# Patient Record
Sex: MALE | Race: WHITE | NOT HISPANIC OR LATINO | Employment: OTHER | URBAN - METROPOLITAN AREA
[De-identification: names, ages, dates, MRNs, and addresses within clinical notes are randomized per-mention and may not be internally consistent; named-entity substitution may affect disease eponyms.]

---

## 2023-09-27 ENCOUNTER — HOSPITAL ENCOUNTER (EMERGENCY)
Facility: HOSPITAL | Age: 83
Discharge: ANOTHER HOSPITAL | End: 2023-09-27
Attending: EMERGENCY MEDICINE
Payer: MEDICARE

## 2023-09-27 VITALS
TEMPERATURE: 98 F | WEIGHT: 160 LBS | BODY MASS INDEX: 22.4 KG/M2 | OXYGEN SATURATION: 98 % | DIASTOLIC BLOOD PRESSURE: 59 MMHG | HEART RATE: 103 BPM | SYSTOLIC BLOOD PRESSURE: 104 MMHG | RESPIRATION RATE: 18 BRPM | HEIGHT: 71 IN

## 2023-09-27 DIAGNOSIS — I21.4 NSTEMI (NON-ST ELEVATED MYOCARDIAL INFARCTION): Primary | ICD-10-CM

## 2023-09-27 DIAGNOSIS — J44.9 CHRONIC OBSTRUCTIVE PULMONARY DISEASE, UNSPECIFIED COPD TYPE: ICD-10-CM

## 2023-09-27 DIAGNOSIS — R06.00 DYSPNEA: ICD-10-CM

## 2023-09-27 DIAGNOSIS — E87.20 LACTIC ACIDOSIS: ICD-10-CM

## 2023-09-27 LAB
ALBUMIN SERPL BCP-MCNC: 4.3 G/DL (ref 3.5–5.2)
ALLENS TEST: ABNORMAL
ALP SERPL-CCNC: 84 U/L (ref 55–135)
ALT SERPL W/O P-5'-P-CCNC: 34 U/L (ref 10–44)
ANION GAP SERPL CALC-SCNC: 14 MMOL/L (ref 8–16)
APTT PPP: 26.9 SEC (ref 21–32)
AST SERPL-CCNC: 47 U/L (ref 10–40)
BASOPHILS # BLD AUTO: 0.09 K/UL (ref 0–0.2)
BASOPHILS NFR BLD: 0.8 % (ref 0–1.9)
BILIRUB SERPL-MCNC: 0.9 MG/DL (ref 0.1–1)
BILIRUB UR QL STRIP: NEGATIVE
BNP SERPL-MCNC: 1132 PG/ML (ref 0–99)
BUN SERPL-MCNC: 13 MG/DL (ref 8–23)
CALCIUM SERPL-MCNC: 9.2 MG/DL (ref 8.7–10.5)
CHLORIDE SERPL-SCNC: 100 MMOL/L (ref 95–110)
CK SERPL-CCNC: 129 U/L (ref 20–200)
CLARITY UR REFRACT.AUTO: CLEAR
CO2 SERPL-SCNC: 24 MMOL/L (ref 23–29)
COLOR UR AUTO: YELLOW
CREAT SERPL-MCNC: 0.8 MG/DL (ref 0.5–1.4)
CTP QC/QA: YES
CTP QC/QA: YES
D DIMER PPP IA.FEU-MCNC: 1.67 MG/L FEU
DIFFERENTIAL METHOD: ABNORMAL
EOSINOPHIL # BLD AUTO: 0.1 K/UL (ref 0–0.5)
EOSINOPHIL NFR BLD: 1.1 % (ref 0–8)
ERYTHROCYTE [DISTWIDTH] IN BLOOD BY AUTOMATED COUNT: 13.2 % (ref 11.5–14.5)
EST. GFR  (NO RACE VARIABLE): >60 ML/MIN/1.73 M^2
GLUCOSE SERPL-MCNC: 212 MG/DL (ref 70–110)
GLUCOSE UR QL STRIP: NEGATIVE
HCO3 UR-SCNC: 24.9 MMOL/L
HCT VFR BLD AUTO: 49.9 % (ref 40–54)
HGB BLD-MCNC: 16.5 G/DL (ref 14–18)
HGB UR QL STRIP: NEGATIVE
IMM GRANULOCYTES # BLD AUTO: 0.08 K/UL (ref 0–0.04)
IMM GRANULOCYTES NFR BLD AUTO: 0.7 % (ref 0–0.5)
INR PPP: 1 (ref 0.8–1.2)
KETONES UR QL STRIP: ABNORMAL
LACTATE SERPL-SCNC: 3.6 MMOL/L (ref 0.5–2.2)
LEUKOCYTE ESTERASE UR QL STRIP: NEGATIVE
LYMPHOCYTES # BLD AUTO: 3.4 K/UL (ref 1–4.8)
LYMPHOCYTES NFR BLD: 30 % (ref 18–48)
MAGNESIUM SERPL-MCNC: 2.1 MG/DL (ref 1.6–2.6)
MCH RBC QN AUTO: 32.1 PG (ref 27–31)
MCHC RBC AUTO-ENTMCNC: 33.1 G/DL (ref 32–36)
MCV RBC AUTO: 97 FL (ref 82–98)
MONOCYTES # BLD AUTO: 1.2 K/UL (ref 0.3–1)
MONOCYTES NFR BLD: 10.7 % (ref 4–15)
NEUTROPHILS # BLD AUTO: 6.5 K/UL (ref 1.8–7.7)
NEUTROPHILS NFR BLD: 56.7 % (ref 38–73)
NITRITE UR QL STRIP: NEGATIVE
NRBC BLD-RTO: 0 /100 WBC
PCO2 BLDA: 59.5 MMHG (ref 35–45)
PH SMN: 7.23 [PH] (ref 7.35–7.45)
PH UR STRIP: 7 [PH] (ref 5–8)
PLATELET # BLD AUTO: 209 K/UL (ref 150–450)
PMV BLD AUTO: 10.5 FL (ref 9.2–12.9)
PO2 BLDA: 80 MMHG (ref 80–100)
POC BE: -3 MMOL/L
POC MOLECULAR INFLUENZA A AGN: NEGATIVE
POC MOLECULAR INFLUENZA B AGN: NEGATIVE
POC SATURATED O2: 93 % (ref 95–100)
POTASSIUM SERPL-SCNC: 3.8 MMOL/L (ref 3.5–5.1)
PROCALCITONIN SERPL IA-MCNC: <0.02 NG/ML
PROT SERPL-MCNC: 7.1 G/DL (ref 6–8.4)
PROT UR QL STRIP: ABNORMAL
PROTHROMBIN TIME: 10.8 SEC (ref 9–12.5)
RBC # BLD AUTO: 5.14 M/UL (ref 4.6–6.2)
SAMPLE: ABNORMAL
SARS-COV-2 RDRP RESP QL NAA+PROBE: NEGATIVE
SITE: ABNORMAL
SODIUM SERPL-SCNC: 138 MMOL/L (ref 136–145)
SP GR UR STRIP: 1.01 (ref 1–1.03)
TROPONIN I SERPL DL<=0.01 NG/ML-MCNC: 0.71 NG/ML (ref 0–0.03)
URN SPEC COLLECT METH UR: ABNORMAL
UROBILINOGEN UR STRIP-ACNC: NEGATIVE EU/DL
WBC # BLD AUTO: 11.43 K/UL (ref 3.9–12.7)

## 2023-09-27 PROCEDURE — 82550 ASSAY OF CK (CPK): CPT | Mod: ER | Performed by: EMERGENCY MEDICINE

## 2023-09-27 PROCEDURE — 84484 ASSAY OF TROPONIN QUANT: CPT | Mod: ER | Performed by: EMERGENCY MEDICINE

## 2023-09-27 PROCEDURE — 25500020 PHARM REV CODE 255: Mod: ER | Performed by: EMERGENCY MEDICINE

## 2023-09-27 PROCEDURE — 94640 AIRWAY INHALATION TREATMENT: CPT | Mod: ER

## 2023-09-27 PROCEDURE — 93005 ELECTROCARDIOGRAM TRACING: CPT | Mod: ER

## 2023-09-27 PROCEDURE — 87635 SARS-COV-2 COVID-19 AMP PRB: CPT | Mod: ER | Performed by: EMERGENCY MEDICINE

## 2023-09-27 PROCEDURE — 85730 THROMBOPLASTIN TIME PARTIAL: CPT | Mod: ER | Performed by: EMERGENCY MEDICINE

## 2023-09-27 PROCEDURE — 83605 ASSAY OF LACTIC ACID: CPT | Mod: ER | Performed by: EMERGENCY MEDICINE

## 2023-09-27 PROCEDURE — 27100171 HC OXYGEN HIGH FLOW UP TO 24 HOURS: Mod: ER

## 2023-09-27 PROCEDURE — 83880 ASSAY OF NATRIURETIC PEPTIDE: CPT | Mod: ER | Performed by: EMERGENCY MEDICINE

## 2023-09-27 PROCEDURE — 94660 CPAP INITIATION&MGMT: CPT | Mod: ER

## 2023-09-27 PROCEDURE — 99291 CRITICAL CARE FIRST HOUR: CPT | Mod: 25,ER

## 2023-09-27 PROCEDURE — 87502 INFLUENZA DNA AMP PROBE: CPT | Mod: ER

## 2023-09-27 PROCEDURE — 81003 URINALYSIS AUTO W/O SCOPE: CPT | Mod: ER | Performed by: EMERGENCY MEDICINE

## 2023-09-27 PROCEDURE — 93010 EKG 12-LEAD: ICD-10-PCS | Mod: ,,, | Performed by: INTERNAL MEDICINE

## 2023-09-27 PROCEDURE — 80053 COMPREHEN METABOLIC PANEL: CPT | Mod: ER | Performed by: EMERGENCY MEDICINE

## 2023-09-27 PROCEDURE — 63600175 PHARM REV CODE 636 W HCPCS: Mod: ER | Performed by: EMERGENCY MEDICINE

## 2023-09-27 PROCEDURE — 94761 N-INVAS EAR/PLS OXIMETRY MLT: CPT | Mod: ER

## 2023-09-27 PROCEDURE — 93010 ELECTROCARDIOGRAM REPORT: CPT | Mod: ,,, | Performed by: INTERNAL MEDICINE

## 2023-09-27 PROCEDURE — 96375 TX/PRO/DX INJ NEW DRUG ADDON: CPT | Mod: ER

## 2023-09-27 PROCEDURE — 96366 THER/PROPH/DIAG IV INF ADDON: CPT | Mod: ER

## 2023-09-27 PROCEDURE — 96374 THER/PROPH/DIAG INJ IV PUSH: CPT | Mod: 59,ER

## 2023-09-27 PROCEDURE — 25000003 PHARM REV CODE 250: Mod: ER | Performed by: EMERGENCY MEDICINE

## 2023-09-27 PROCEDURE — 96365 THER/PROPH/DIAG IV INF INIT: CPT | Mod: ER

## 2023-09-27 PROCEDURE — 87040 BLOOD CULTURE FOR BACTERIA: CPT | Mod: 59 | Performed by: EMERGENCY MEDICINE

## 2023-09-27 PROCEDURE — 96367 TX/PROPH/DG ADDL SEQ IV INF: CPT | Mod: ER

## 2023-09-27 PROCEDURE — 99900035 HC TECH TIME PER 15 MIN (STAT): Mod: ER

## 2023-09-27 PROCEDURE — 85379 FIBRIN DEGRADATION QUANT: CPT | Mod: ER | Performed by: EMERGENCY MEDICINE

## 2023-09-27 PROCEDURE — 85025 COMPLETE CBC W/AUTO DIFF WBC: CPT | Mod: ER | Performed by: EMERGENCY MEDICINE

## 2023-09-27 PROCEDURE — 82800 BLOOD PH: CPT | Mod: ER

## 2023-09-27 PROCEDURE — 82803 BLOOD GASES ANY COMBINATION: CPT | Mod: ER

## 2023-09-27 PROCEDURE — 36600 WITHDRAWAL OF ARTERIAL BLOOD: CPT | Mod: ER,59

## 2023-09-27 PROCEDURE — 83735 ASSAY OF MAGNESIUM: CPT | Mod: ER | Performed by: EMERGENCY MEDICINE

## 2023-09-27 PROCEDURE — 25000242 PHARM REV CODE 250 ALT 637 W/ HCPCS: Mod: ER | Performed by: EMERGENCY MEDICINE

## 2023-09-27 PROCEDURE — 84145 PROCALCITONIN (PCT): CPT | Mod: ER | Performed by: EMERGENCY MEDICINE

## 2023-09-27 PROCEDURE — 85610 PROTHROMBIN TIME: CPT | Mod: ER | Performed by: EMERGENCY MEDICINE

## 2023-09-27 PROCEDURE — 27000190 HC CPAP FULL FACE MASK W/VALVE: Mod: ER

## 2023-09-27 RX ORDER — ASPIRIN 325 MG
325 TABLET ORAL
Status: COMPLETED | OUTPATIENT
Start: 2023-09-27 | End: 2023-09-27

## 2023-09-27 RX ORDER — IPRATROPIUM BROMIDE AND ALBUTEROL SULFATE 2.5; .5 MG/3ML; MG/3ML
3 SOLUTION RESPIRATORY (INHALATION)
Status: COMPLETED | OUTPATIENT
Start: 2023-09-27 | End: 2023-09-27

## 2023-09-27 RX ORDER — FUROSEMIDE 10 MG/ML
40 INJECTION INTRAMUSCULAR; INTRAVENOUS
Status: DISCONTINUED | OUTPATIENT
Start: 2023-09-27 | End: 2023-09-27

## 2023-09-27 RX ORDER — FUROSEMIDE 10 MG/ML
40 INJECTION INTRAMUSCULAR; INTRAVENOUS
Status: COMPLETED | OUTPATIENT
Start: 2023-09-27 | End: 2023-09-27

## 2023-09-27 RX ORDER — LORAZEPAM 2 MG/ML
0.5 INJECTION INTRAMUSCULAR
Status: DISCONTINUED | OUTPATIENT
Start: 2023-09-27 | End: 2023-09-27

## 2023-09-27 RX ORDER — CLONIDINE HYDROCHLORIDE 0.1 MG/1
0.1 TABLET ORAL
Status: COMPLETED | OUTPATIENT
Start: 2023-09-27 | End: 2023-09-27

## 2023-09-27 RX ORDER — MAGNESIUM SULFATE HEPTAHYDRATE 40 MG/ML
2 INJECTION, SOLUTION INTRAVENOUS
Status: COMPLETED | OUTPATIENT
Start: 2023-09-27 | End: 2023-09-27

## 2023-09-27 RX ORDER — HEPARIN SODIUM,PORCINE/D5W 25000/250
0-40 INTRAVENOUS SOLUTION INTRAVENOUS CONTINUOUS
Status: DISCONTINUED | OUTPATIENT
Start: 2023-09-27 | End: 2023-09-27 | Stop reason: HOSPADM

## 2023-09-27 RX ADMIN — CEFTRIAXONE 1 G: 1 INJECTION, POWDER, FOR SOLUTION INTRAMUSCULAR; INTRAVENOUS at 01:09

## 2023-09-27 RX ADMIN — IPRATROPIUM BROMIDE AND ALBUTEROL SULFATE 3 ML: .5; 3 SOLUTION RESPIRATORY (INHALATION) at 01:09

## 2023-09-27 RX ADMIN — MAGNESIUM SULFATE 2 G: 2 INJECTION INTRAVENOUS at 01:09

## 2023-09-27 RX ADMIN — HEPARIN SODIUM 12 UNITS/KG/HR: 10000 INJECTION, SOLUTION INTRAVENOUS at 02:09

## 2023-09-27 RX ADMIN — CLONIDINE HYDROCHLORIDE 0.1 MG: 0.1 TABLET ORAL at 01:09

## 2023-09-27 RX ADMIN — FUROSEMIDE 40 MG: 10 INJECTION, SOLUTION INTRAMUSCULAR; INTRAVENOUS at 02:09

## 2023-09-27 RX ADMIN — IOHEXOL 100 ML: 350 INJECTION, SOLUTION INTRAVENOUS at 02:09

## 2023-09-27 RX ADMIN — ASPIRIN 325 MG: 325 TABLET ORAL at 02:09

## 2023-09-27 NOTE — ED PROVIDER NOTES
Encounter Date: 9/27/2023       History     Chief Complaint   Patient presents with    Shortness of Breath     Hx of COPD. Wife had covid x1 week ago     The history is provided by the patient.   Shortness of Breath  This is a recurrent problem. The problem occurs continuously.The current episode started 3 to 5 hours ago. The problem has not changed since onset.Associated symptoms include cough and wheezing. Pertinent negatives include no fever, no sore throat, no sputum production, no hemoptysis, no chest pain, no abdominal pain, no rash, no leg pain and no leg swelling.   Pt s/o reportedly had Covid last week. Pt c Hx COPD. Pt tx c Solumedrol and Nebs by EMS prior to arrival.    Review of patient's allergies indicates:   Allergen Reactions    Sulfamethoxazole-trimethoprim Itching     No past medical history on file.  No past surgical history on file.  No family history on file.     Review of Systems   Constitutional:  Negative for fever.   HENT:  Negative for congestion and sore throat.    Eyes:  Negative for photophobia and visual disturbance.   Respiratory:  Positive for cough, shortness of breath and wheezing. Negative for hemoptysis and sputum production.    Cardiovascular:  Negative for chest pain and leg swelling.   Gastrointestinal:  Negative for abdominal pain and nausea.   Genitourinary:  Negative for dysuria.   Musculoskeletal:  Negative for back pain.   Skin:  Negative for rash.   Neurological:  Negative for weakness.   Hematological:  Does not bruise/bleed easily.       Physical Exam     Initial Vitals   BP Pulse Resp Temp SpO2   09/27/23 0117 09/27/23 0115 09/27/23 0115 -- 09/27/23 0115   (!) 160/118 (!) 126 (!) 32  100 %      MAP       --                Physical Exam    Nursing note and vitals reviewed.  Constitutional: He appears well-developed and well-nourished. He appears distressed.   Thin malnourished appearing   HENT:   Head: Normocephalic and atraumatic.   Mouth/Throat: Oropharynx is clear  and moist.   Eyes: Conjunctivae and EOM are normal. Pupils are equal, round, and reactive to light.   Neck: Neck supple.   Normal range of motion.  Cardiovascular:  Regular rhythm and normal heart sounds.   Tachycardia present.         Pulmonary/Chest: Accessory muscle usage present. Tachypnea noted. He is in respiratory distress. He has wheezes.   Abdominal: Abdomen is soft. Bowel sounds are normal.   Musculoskeletal:         General: Normal range of motion.      Cervical back: Normal range of motion and neck supple.     Neurological: He is alert and oriented to person, place, and time. He has normal strength.   Skin: Skin is warm and dry.   Psychiatric: He has a normal mood and affect. Thought content normal.         ED Course   Critical Care    Date/Time: 9/27/2023 2:29 AM    Performed by: Benja Aceves MD  Authorized by: Bneja Aceves MD  Direct patient critical care time: 17 minutes  Additional history critical care time: 9 minutes  Ordering / reviewing critical care time: 8 minutes  Documentation critical care time: 10 minutes  Consult with family critical care time: 6 minutes  Total critical care time (exclusive of procedural time) : 50 minutes  Critical care time was exclusive of separately billable procedures and treating other patients.  Critical care was necessary to treat or prevent imminent or life-threatening deterioration of the following conditions: cardiac failure and respiratory failure.  Critical care was time spent personally by me on the following activities: blood draw for specimens, development of treatment plan with patient or surrogate, evaluation of patient's response to treatment, examination of patient, obtaining history from patient or surrogate, ordering and review of laboratory studies, ordering and performing treatments and interventions, ordering and review of radiographic studies, pulse oximetry and re-evaluation of patient's condition.        Labs Reviewed    LACTIC ACID, PLASMA - Abnormal; Notable for the following components:       Result Value    Lactate (Lactic Acid) 3.6 (*)     All other components within normal limits    Narrative:      Lactic acid  critical result(s) called and verbal readback obtained   from Sofía Monae  by CG4 09/27/2023 01:53   B-TYPE NATRIURETIC PEPTIDE - Abnormal; Notable for the following components:    BNP 1,132 (*)     All other components within normal limits   CBC W/ AUTO DIFFERENTIAL - Abnormal; Notable for the following components:    MCH 32.1 (*)     Immature Granulocytes 0.7 (*)     Immature Grans (Abs) 0.08 (*)     Mono # 1.2 (*)     All other components within normal limits   COMPREHENSIVE METABOLIC PANEL - Abnormal; Notable for the following components:    Glucose 212 (*)     AST 47 (*)     All other components within normal limits   TROPONIN I - Abnormal; Notable for the following components:    Troponin I 0.712 (*)     All other components within normal limits   D DIMER, QUANTITATIVE - Abnormal; Notable for the following components:    D-Dimer 1.67 (*)     All other components within normal limits   ISTAT PROCEDURE - Abnormal; Notable for the following components:    POC PH 7.229 (*)     POC PCO2 59.5 (*)     POC SATURATED O2 93 (*)     All other components within normal limits   CULTURE, BLOOD   CULTURE, BLOOD   CK   MAGNESIUM   PROCALCITONIN   URINALYSIS, REFLEX TO URINE CULTURE   APTT   APTT   PROTIME-INR   CBC W/ AUTO DIFFERENTIAL   SARS-COV-2 RDRP GENE   POCT INFLUENZA A/B MOLECULAR     Results for orders placed or performed during the hospital encounter of 09/27/23   Lactic Acid, Plasma   Result Value Ref Range    Lactate (Lactic Acid) 3.6 (HH) 0.5 - 2.2 mmol/L   BNP   Result Value Ref Range    BNP 1,132 (H) 0 - 99 pg/mL   CK   Result Value Ref Range     20 - 200 U/L   CBC Auto Differential   Result Value Ref Range    WBC 11.43 3.90 - 12.70 K/uL    RBC 5.14 4.60 - 6.20 M/uL    Hemoglobin 16.5 14.0 - 18.0 g/dL     Hematocrit 49.9 40.0 - 54.0 %    MCV 97 82 - 98 fL    MCH 32.1 (H) 27.0 - 31.0 pg    MCHC 33.1 32.0 - 36.0 g/dL    RDW 13.2 11.5 - 14.5 %    Platelets 209 150 - 450 K/uL    MPV 10.5 9.2 - 12.9 fL    Immature Granulocytes 0.7 (H) 0.0 - 0.5 %    Gran # (ANC) 6.5 1.8 - 7.7 K/uL    Immature Grans (Abs) 0.08 (H) 0.00 - 0.04 K/uL    Lymph # 3.4 1.0 - 4.8 K/uL    Mono # 1.2 (H) 0.3 - 1.0 K/uL    Eos # 0.1 0.0 - 0.5 K/uL    Baso # 0.09 0.00 - 0.20 K/uL    nRBC 0 0 /100 WBC    Gran % 56.7 38.0 - 73.0 %    Lymph % 30.0 18.0 - 48.0 %    Mono % 10.7 4.0 - 15.0 %    Eosinophil % 1.1 0.0 - 8.0 %    Basophil % 0.8 0.0 - 1.9 %    Differential Method Automated    Comprehensive Metabolic Panel   Result Value Ref Range    Sodium 138 136 - 145 mmol/L    Potassium 3.8 3.5 - 5.1 mmol/L    Chloride 100 95 - 110 mmol/L    CO2 24 23 - 29 mmol/L    Glucose 212 (H) 70 - 110 mg/dL    BUN 13 8 - 23 mg/dL    Creatinine 0.8 0.5 - 1.4 mg/dL    Calcium 9.2 8.7 - 10.5 mg/dL    Total Protein 7.1 6.0 - 8.4 g/dL    Albumin 4.3 3.5 - 5.2 g/dL    Total Bilirubin 0.9 0.1 - 1.0 mg/dL    Alkaline Phosphatase 84 55 - 135 U/L    AST 47 (H) 10 - 40 U/L    ALT 34 10 - 44 U/L    eGFR >60.0 >60 mL/min/1.73 m^2    Anion Gap 14 8 - 16 mmol/L   Magnesium   Result Value Ref Range    Magnesium 2.1 1.6 - 2.6 mg/dL   Procalcitonin   Result Value Ref Range    Procalcitonin <0.02 <0.25 ng/mL   Troponin I   Result Value Ref Range    Troponin I 0.712 (H) 0.000 - 0.026 ng/mL   D dimer, quantitative   Result Value Ref Range    D-Dimer 1.67 (H) <0.50 mg/L FEU   POCT COVID-19 Rapid Screening   Result Value Ref Range    POC Rapid COVID Negative Negative     Acceptable Yes    POCT Influenza A/B Molecular   Result Value Ref Range    POC Molecular Influenza A Ag Negative Negative, Not Reported    POC Molecular Influenza B Ag Negative Negative, Not Reported     Acceptable Yes    ISTAT PROCEDURE   Result Value Ref Range    POC PH 7.229 (LL) 7.35 - 7.45     POC PCO2 59.5 (HH) 35 - 45 mmHg    POC PO2 80 80 - 100 mmHg    POC HCO3 24.9 mmol/L    POC BE -3 mmol/L    POC SATURATED O2 93 (L) 95 - 100 %    Sample ARTERIAL     Site LR     Allens Test Pass        EKG Readings: (Independently Interpreted)   Rhythm: Normal Sinus Rhythm. Ectopy: PVCs Rare. ST Segments: Non-Specific ST Segment Depression. T Waves: Normal. Axis: Normal. Clinical Impression: Normal Sinus Rhythm with RBBB       Imaging Results              CTA Chest Non-Coronary (PE Studies) (In process)                      X-Ray Chest 1 View (Preliminary result)  Result time 09/27/23 01:38:04      Wet Read by Benja Aceves MD (09/27/23 01:38:04, [facility] - Emergency Dept, Emergency Medicine)    Hyperexpanded lung fields  Interstitial Prominence                                     Medications   magnesium sulfate 2g in water 50mL IVPB (premix) (2 g Intravenous New Bag 9/27/23 0133)   heparin 25,000 units in dextrose 5% (100 units/ml) IV bolus from bag INITIAL BOLUS (max bolus 4000 units) (has no administration in time range)   heparin 25,000 units in dextrose 5% 250 mL (100 units/mL) infusion LOW INTENSITY nomogram - [facility] (has no administration in time range)   heparin 25,000 units in dextrose 5% (100 units/ml) IV bolus from bag - ADDITIONAL PRN BOLUS - 60 units/kg (max bolus 4000 units) (has no administration in time range)   heparin 25,000 units in dextrose 5% (100 units/ml) IV bolus from bag - ADDITIONAL PRN BOLUS - 30 units/kg (max bolus 4000 units) (has no administration in time range)   aspirin tablet 325 mg (has no administration in time range)   furosemide injection 40 mg (has no administration in time range)   albuterol-ipratropium 2.5 mg-0.5 mg/3 mL nebulizer solution 3 mL (3 mLs Nebulization Given 9/27/23 0115)   cloNIDine tablet 0.1 mg (0.1 mg Oral Given 9/27/23 0137)   cefTRIAXone (ROCEPHIN) 1 g in dextrose 5 % in water (D5W) 100 mL IVPB (MB+) (1 g Intravenous New Bag 9/27/23 0154)    iohexoL (OMNIPAQUE 350) injection 100 mL (100 mLs Intravenous Given 9/27/23 0220)     Medical Decision Making  Problems Addressed:  Chronic obstructive pulmonary disease, unspecified COPD type: acute illness or injury  Dyspnea: acute illness or injury  NSTEMI (non-ST elevated myocardial infarction): acute illness or injury that poses a threat to life or bodily functions    Amount and/or Complexity of Data Reviewed  Labs: ordered.  Radiology: ordered and independent interpretation performed.  ECG/medicine tests: ordered and independent interpretation performed. Decision-making details documented in ED Course.    Risk  OTC drugs.  Prescription drug management.  Decision regarding hospitalization.    Critical Care  Total time providing critical care: 50 minutes    Discussed need for admission for Dyspnea/ NSTEMi offered admission to [facility] but s/o and pt requests transfer to [facility].     2:26 AM Discussed lab/imaging studies with patient and the need for further evaluation/admission for NSTEMI. Pt verbalized understanding that this is a stand alone ER and we are unable to admit at this facility. Pt will be transferred to [facility] via [facility] with care en route to include Heparin drip/ BIPAP. I discussed this case with hs and care was accepted by Dr Dey.                             Clinical Impression:   Final diagnoses:  [R06.00] Dyspnea  [I21.4] NSTEMI (non-ST elevated myocardial infarction) (Primary)  [J44.9] Chronic obstructive pulmonary disease, unspecified COPD type  [E87.20] Lactic acidosis        ED Disposition Condition    Transfer to Another Facility Serious                Benja Aceves MD  09/27/23 [id number]       Benja Aceves MD  09/27/23 [id number]

## 2023-09-27 NOTE — RESPIRATORY THERAPY
Patient became short of breath when laying down during CT. Patient placed on BiPAP 12 / 6   Fio2 40%.

## 2023-10-02 LAB
BACTERIA BLD CULT: NORMAL
BACTERIA BLD CULT: NORMAL

## 2024-04-22 ENCOUNTER — HOSPITAL ENCOUNTER (INPATIENT)
Facility: HOSPITAL | Age: 84
LOS: 10 days | Discharge: NURSING, REHAB OR LONG-TERM CARE | DRG: 177 | End: 2024-05-02
Attending: EMERGENCY MEDICINE | Admitting: INTERNAL MEDICINE
Payer: MEDICARE

## 2024-04-22 DIAGNOSIS — J90 PLEURAL EFFUSION, BILATERAL: ICD-10-CM

## 2024-04-22 DIAGNOSIS — R91.8 LUNG MASS: ICD-10-CM

## 2024-04-22 DIAGNOSIS — R06.00 DYSPNEA: ICD-10-CM

## 2024-04-22 DIAGNOSIS — J18.9 PNEUMONIA: ICD-10-CM

## 2024-04-22 DIAGNOSIS — J18.9 PNEUMONIA OF BOTH LUNGS DUE TO INFECTIOUS ORGANISM, UNSPECIFIED PART OF LUNG: Primary | ICD-10-CM

## 2024-04-22 LAB
ALBUMIN SERPL BCP-MCNC: 3 G/DL (ref 3.5–5.2)
ALP SERPL-CCNC: 85 U/L (ref 55–135)
ALT SERPL W/O P-5'-P-CCNC: 31 U/L (ref 10–44)
ANION GAP SERPL CALC-SCNC: 14 MMOL/L (ref 8–16)
AST SERPL-CCNC: 28 U/L (ref 10–40)
BASOPHILS # BLD AUTO: 0.06 K/UL (ref 0–0.2)
BASOPHILS NFR BLD: 0.3 % (ref 0–1.9)
BILIRUB SERPL-MCNC: 0.3 MG/DL (ref 0.1–1)
BILIRUB UR QL STRIP: NEGATIVE
BNP SERPL-MCNC: 85 PG/ML (ref 0–99)
BUN SERPL-MCNC: 19 MG/DL (ref 8–23)
CALCIUM SERPL-MCNC: 9.4 MG/DL (ref 8.7–10.5)
CHLORIDE SERPL-SCNC: 93 MMOL/L (ref 95–110)
CLARITY UR REFRACT.AUTO: CLEAR
CO2 SERPL-SCNC: 24 MMOL/L (ref 23–29)
COLOR UR AUTO: YELLOW
CREAT SERPL-MCNC: 0.8 MG/DL (ref 0.5–1.4)
DIFFERENTIAL METHOD BLD: ABNORMAL
EOSINOPHIL # BLD AUTO: 0.1 K/UL (ref 0–0.5)
EOSINOPHIL NFR BLD: 0.5 % (ref 0–8)
ERYTHROCYTE [DISTWIDTH] IN BLOOD BY AUTOMATED COUNT: 12.7 % (ref 11.5–14.5)
EST. GFR  (NO RACE VARIABLE): >60 ML/MIN/1.73 M^2
GLUCOSE SERPL-MCNC: 129 MG/DL (ref 70–110)
GLUCOSE UR QL STRIP: NEGATIVE
HCT VFR BLD AUTO: 34.5 % (ref 40–54)
HGB BLD-MCNC: 11.5 G/DL (ref 14–18)
HGB UR QL STRIP: NEGATIVE
IMM GRANULOCYTES # BLD AUTO: 0.12 K/UL (ref 0–0.04)
IMM GRANULOCYTES NFR BLD AUTO: 0.7 % (ref 0–0.5)
KETONES UR QL STRIP: NEGATIVE
LACTATE SERPL-SCNC: 1 MMOL/L (ref 0.5–2.2)
LACTATE SERPL-SCNC: 1.3 MMOL/L (ref 0.5–2.2)
LEUKOCYTE ESTERASE UR QL STRIP: NEGATIVE
LYMPHOCYTES # BLD AUTO: 0.9 K/UL (ref 1–4.8)
LYMPHOCYTES NFR BLD: 5 % (ref 18–48)
MCH RBC QN AUTO: 31.2 PG (ref 27–31)
MCHC RBC AUTO-ENTMCNC: 33.3 G/DL (ref 32–36)
MCV RBC AUTO: 94 FL (ref 82–98)
MONOCYTES # BLD AUTO: 1.7 K/UL (ref 0.3–1)
MONOCYTES NFR BLD: 9.6 % (ref 4–15)
NEUTROPHILS # BLD AUTO: 14.6 K/UL (ref 1.8–7.7)
NEUTROPHILS NFR BLD: 83.9 % (ref 38–73)
NITRITE UR QL STRIP: NEGATIVE
NRBC BLD-RTO: 0 /100 WBC
OHS QRS DURATION: 134 MS
OHS QTC CALCULATION: 463 MS
PH UR STRIP: 6 [PH] (ref 5–8)
PLATELET # BLD AUTO: 328 K/UL (ref 150–450)
PMV BLD AUTO: 9.8 FL (ref 9.2–12.9)
POTASSIUM SERPL-SCNC: 4.7 MMOL/L (ref 3.5–5.1)
PROCALCITONIN SERPL IA-MCNC: 0.09 NG/ML
PROT SERPL-MCNC: 7.1 G/DL (ref 6–8.4)
PROT UR QL STRIP: ABNORMAL
RBC # BLD AUTO: 3.69 M/UL (ref 4.6–6.2)
SODIUM SERPL-SCNC: 131 MMOL/L (ref 136–145)
SP GR UR STRIP: 1.01 (ref 1–1.03)
TROPONIN I SERPL DL<=0.01 NG/ML-MCNC: 0.01 NG/ML (ref 0–0.03)
TROPONIN I SERPL DL<=0.01 NG/ML-MCNC: <0.006 NG/ML (ref 0–0.03)
URN SPEC COLLECT METH UR: ABNORMAL
UROBILINOGEN UR STRIP-ACNC: NEGATIVE EU/DL
WBC # BLD AUTO: 17.33 K/UL (ref 3.9–12.7)

## 2024-04-22 PROCEDURE — 27000221 HC OXYGEN, UP TO 24 HOURS: Mod: ER

## 2024-04-22 PROCEDURE — 94761 N-INVAS EAR/PLS OXIMETRY MLT: CPT | Mod: ER

## 2024-04-22 PROCEDURE — 96365 THER/PROPH/DIAG IV INF INIT: CPT | Mod: ER

## 2024-04-22 PROCEDURE — 93010 ELECTROCARDIOGRAM REPORT: CPT | Mod: ,,, | Performed by: INTERNAL MEDICINE

## 2024-04-22 PROCEDURE — 87040 BLOOD CULTURE FOR BACTERIA: CPT | Performed by: EMERGENCY MEDICINE

## 2024-04-22 PROCEDURE — 83880 ASSAY OF NATRIURETIC PEPTIDE: CPT | Mod: ER | Performed by: EMERGENCY MEDICINE

## 2024-04-22 PROCEDURE — 99285 EMERGENCY DEPT VISIT HI MDM: CPT | Mod: 25,ER

## 2024-04-22 PROCEDURE — 81003 URINALYSIS AUTO W/O SCOPE: CPT | Mod: ER | Performed by: EMERGENCY MEDICINE

## 2024-04-22 PROCEDURE — 93005 ELECTROCARDIOGRAM TRACING: CPT | Mod: ER

## 2024-04-22 PROCEDURE — 80053 COMPREHEN METABOLIC PANEL: CPT | Mod: ER | Performed by: EMERGENCY MEDICINE

## 2024-04-22 PROCEDURE — 0W9B3ZZ DRAINAGE OF LEFT PLEURAL CAVITY, PERCUTANEOUS APPROACH: ICD-10-PCS | Performed by: RADIOLOGY

## 2024-04-22 PROCEDURE — 25000242 PHARM REV CODE 250 ALT 637 W/ HCPCS: Mod: ER | Performed by: EMERGENCY MEDICINE

## 2024-04-22 PROCEDURE — 83605 ASSAY OF LACTIC ACID: CPT | Mod: ER | Performed by: EMERGENCY MEDICINE

## 2024-04-22 PROCEDURE — 94640 AIRWAY INHALATION TREATMENT: CPT | Mod: ER,XB

## 2024-04-22 PROCEDURE — 25500020 PHARM REV CODE 255: Mod: ER | Performed by: EMERGENCY MEDICINE

## 2024-04-22 PROCEDURE — 85025 COMPLETE CBC W/AUTO DIFF WBC: CPT | Mod: ER | Performed by: EMERGENCY MEDICINE

## 2024-04-22 PROCEDURE — 96375 TX/PRO/DX INJ NEW DRUG ADDON: CPT | Mod: ER

## 2024-04-22 PROCEDURE — 11000001 HC ACUTE MED/SURG PRIVATE ROOM

## 2024-04-22 PROCEDURE — 84484 ASSAY OF TROPONIN QUANT: CPT | Mod: 91,ER | Performed by: EMERGENCY MEDICINE

## 2024-04-22 PROCEDURE — 84145 PROCALCITONIN (PCT): CPT | Mod: ER | Performed by: EMERGENCY MEDICINE

## 2024-04-22 PROCEDURE — 63600175 PHARM REV CODE 636 W HCPCS: Mod: ER | Performed by: EMERGENCY MEDICINE

## 2024-04-22 PROCEDURE — 25000003 PHARM REV CODE 250: Mod: ER | Performed by: EMERGENCY MEDICINE

## 2024-04-22 RX ORDER — TRAMADOL HYDROCHLORIDE 50 MG/1
50 TABLET ORAL
Status: COMPLETED | OUTPATIENT
Start: 2024-04-22 | End: 2024-04-22

## 2024-04-22 RX ORDER — GABAPENTIN 300 MG/1
600 CAPSULE ORAL
Status: COMPLETED | OUTPATIENT
Start: 2024-04-22 | End: 2024-04-22

## 2024-04-22 RX ORDER — METHYLPREDNISOLONE SOD SUCC 125 MG
125 VIAL (EA) INJECTION
Status: COMPLETED | OUTPATIENT
Start: 2024-04-22 | End: 2024-04-22

## 2024-04-22 RX ORDER — FINASTERIDE 5 MG/1
5 TABLET, FILM COATED ORAL
Status: ON HOLD | COMMUNITY
End: 2024-05-02

## 2024-04-22 RX ORDER — ALBUTEROL SULFATE 90 UG/1
AEROSOL, METERED RESPIRATORY (INHALATION)
Status: ON HOLD | COMMUNITY
End: 2024-05-02

## 2024-04-22 RX ORDER — CYCLOBENZAPRINE HCL 10 MG
10 TABLET ORAL
Status: ON HOLD | COMMUNITY
Start: 2024-01-02 | End: 2024-05-02 | Stop reason: HOSPADM

## 2024-04-22 RX ORDER — FLUTICASONE FUROATE, UMECLIDINIUM BROMIDE AND VILANTEROL TRIFENATATE 100; 62.5; 25 UG/1; UG/1; UG/1
1 POWDER RESPIRATORY (INHALATION)
Status: ON HOLD | COMMUNITY
Start: 2024-04-10 | End: 2024-05-02

## 2024-04-22 RX ORDER — ATORVASTATIN CALCIUM 40 MG/1
1 TABLET, FILM COATED ORAL NIGHTLY
COMMUNITY
Start: 2024-02-12

## 2024-04-22 RX ORDER — HYDRALAZINE HYDROCHLORIDE 50 MG/1
50 TABLET, FILM COATED ORAL 2 TIMES DAILY
Status: ON HOLD | COMMUNITY
End: 2024-05-02

## 2024-04-22 RX ORDER — IPRATROPIUM BROMIDE AND ALBUTEROL SULFATE 2.5; .5 MG/3ML; MG/3ML
3 SOLUTION RESPIRATORY (INHALATION)
Status: COMPLETED | OUTPATIENT
Start: 2024-04-22 | End: 2024-04-22

## 2024-04-22 RX ORDER — NAPROXEN SODIUM 220 MG/1
1 TABLET, FILM COATED ORAL EVERY MORNING
COMMUNITY
Start: 2023-09-29 | End: 2024-09-28

## 2024-04-22 RX ORDER — FUROSEMIDE 20 MG/1
20 TABLET ORAL
Status: ON HOLD | COMMUNITY
Start: 2024-02-15 | End: 2024-05-02 | Stop reason: HOSPADM

## 2024-04-22 RX ORDER — GABAPENTIN 300 MG/1
1200 CAPSULE ORAL
Status: DISCONTINUED | OUTPATIENT
Start: 2024-04-22 | End: 2024-04-22

## 2024-04-22 RX ORDER — GABAPENTIN 600 MG/1
TABLET ORAL
Status: ON HOLD | COMMUNITY
End: 2024-05-02

## 2024-04-22 RX ADMIN — GABAPENTIN 600 MG: 300 CAPSULE ORAL at 03:04

## 2024-04-22 RX ADMIN — CEFTRIAXONE 1 G: 1 INJECTION, POWDER, FOR SOLUTION INTRAMUSCULAR; INTRAVENOUS at 06:04

## 2024-04-22 RX ADMIN — IOHEXOL 100 ML: 350 INJECTION, SOLUTION INTRAVENOUS at 05:04

## 2024-04-22 RX ADMIN — IPRATROPIUM BROMIDE AND ALBUTEROL SULFATE 3 ML: 2.5; .5 SOLUTION RESPIRATORY (INHALATION) at 03:04

## 2024-04-22 RX ADMIN — METHYLPREDNISOLONE SODIUM SUCCINATE 125 MG: 125 INJECTION, POWDER, FOR SOLUTION INTRAMUSCULAR; INTRAVENOUS at 04:04

## 2024-04-22 RX ADMIN — TRAMADOL HYDROCHLORIDE 50 MG: 50 TABLET, COATED ORAL at 05:04

## 2024-04-22 NOTE — ED PROVIDER NOTES
Emergency Medicine Provider Note - 4/22/2024       History     Chief Complaint   Patient presents with    Chest Pain     Reports intermittent chest pain for several weeks. Pt reports that around 8am today, his chest pain came back and is worse than the pain he has been experiencing       Allergies:  Review of patient's allergies indicates:   Allergen Reactions    Sulfamethoxazole-trimethoprim Itching        History of Present Illness   HPI    4/22/2024, 3:02 PM  The history is provided by the patient    Jason Mayfield III is a 83 y.o. male presenting to the ED for chest pain and dyspnea.    Patient has a history fibroadenoma of the breast, chronic diastolic congestive heart failure, COPD, abdominal aortic aneurism (3.4 cm), peripheral neuropathy, hypertension.    Patient states that he started having left-sided chest pain for the past 2-3 days.  It does not radiate to the arm neck or jaw.  It is worse when he takes a deep breath.  Patient reports that he has got a chronic cough.  There has been no change in the production of sputum.  It is not associated with any nausea, vomiting, diarrhea, fever, chills, calf pain, calf tenderness.  Patient normally is on 2 L nasal cannula oxygen.  He recently had to turn the oxygen up to 3-4 L      Arrival mode: Private Vehicle     PCP: TANNER Mane MD     Past Medical History:  No past medical history on file.    Past Surgical History:  No past surgical history on file.      Family History:  No family history on file.    Social History:  Social History     Tobacco Use    Smoking status: Not on file    Smokeless tobacco: Not on file   Substance and Sexual Activity    Alcohol use: Not on file    Drug use: Not on file    Sexual activity: Not on file        Review of Systems   Review of Systems   Constitutional:  Negative for fever.   Respiratory:  Positive for cough. Negative for shortness of breath.    Cardiovascular:  Positive for chest pain.   Gastrointestinal:  Positive for  abdominal pain. Negative for nausea and vomiting.   Genitourinary:  Negative for dysuria.   Musculoskeletal:  Negative for back pain.   Skin:  Negative for rash.   Neurological:  Negative for weakness.   Hematological:  Does not bruise/bleed easily.        Physical Exam     Initial Vitals [04/22/24 1512]   BP Pulse Resp Temp SpO2   135/62 107 (!) 25 98.3 °F (36.8 °C) (!) 91 %      MAP       --          Physical Exam    Nursing Notes and Vital Signs Reviewed.  Constitutional: Patient is in no acute distress. Well-developed and well-nourished.  Head: Atraumatic. Normocephalic.  Eyes: PERRL. EOM intact. Conjunctivae are not pale. No scleral icterus.  ENT: Mucous membranes are moist. Oropharynx is clear and symmetric.    Neck: Supple. Full ROM. No lymphadenopathy.  Cardiovascular: Regular rate. Regular rhythm. No murmurs, rubs, or gallops. Distal pulses are 2+ and symmetric.  Pulmonary/Chest: No respiratory distress. Clear to auscultation bilaterally. No wheezing or rales.  No rash.  Tender to palpation left upper chest  Abdominal: Soft and non-distended.  There is no tenderness.  No rebound, guarding, or rigidity. Good bowel sounds.  Genitourinary: No CVA tenderness  Musculoskeletal: Moves all extremities. No obvious deformities. No edema. No calf tenderness.  Skin: Warm and dry.  Neurological:  Alert, awake, and appropriate.  Normal speech.  No acute focal neurological deficits are appreciated.  Psychiatric: Normal affect. Good eye contact. Appropriate in content.     ED Course   ED Procedures:  Procedures    ED Vital Signs:  Vitals:    04/22/24 1514 04/22/24 1515 04/22/24 1527 04/22/24 1540   BP:       Pulse: 98 107 102 93   Resp: (!) 24   (!) 22   Temp:       TempSrc:       SpO2: (!) 94%   (!) 94%   Weight:       Height:        04/22/24 1547 04/22/24 1548 04/22/24 1632 04/22/24 1700   BP:   137/64    Pulse: 95 102 96    Resp: (!) 22 (!) 22 20 18   Temp:       TempSrc:       SpO2: 96% 98% 97%    Weight:       Height:         04/22/24 1718 04/22/24 1802 04/22/24 1817 04/22/24 1832   BP: (!) 121/59 129/62 (!) 123/59 (!) 116/56   Pulse: 101 95 89 87   Resp: (!) 22 (!) 25 (!) 21 (!) 23   Temp:       TempSrc:       SpO2: 95% 95% 96% 97%   Weight:       Height:        04/22/24 1847 04/22/24 1902 04/22/24 1917   BP: (!) 116/58 (!) 111/54 126/60   Pulse: 87 83 84   Resp: (!) 24 (!) 23 (!) 21   Temp:      TempSrc:      SpO2: 96% 96% 96%   Weight:      Height:          Abnormal Lab Results:  Labs Reviewed   CBC W/ AUTO DIFFERENTIAL - Abnormal; Notable for the following components:       Result Value    WBC 17.33 (*)     RBC 3.69 (*)     Hemoglobin 11.5 (*)     Hematocrit 34.5 (*)     MCH 31.2 (*)     Immature Granulocytes 0.7 (*)     Gran # (ANC) 14.6 (*)     Immature Grans (Abs) 0.12 (*)     Lymph # 0.9 (*)     Mono # 1.7 (*)     Gran % 83.9 (*)     Lymph % 5.0 (*)     All other components within normal limits   COMPREHENSIVE METABOLIC PANEL - Abnormal; Notable for the following components:    Sodium 131 (*)     Chloride 93 (*)     Glucose 129 (*)     Albumin 3.0 (*)     All other components within normal limits   URINALYSIS, REFLEX TO URINE CULTURE - Abnormal; Notable for the following components:    Protein, UA Trace (*)     All other components within normal limits    Narrative:     Specimen Source->Urine   CULTURE, BLOOD   CULTURE, BLOOD   PROCALCITONIN   B-TYPE NATRIURETIC PEPTIDE   TROPONIN I   LACTIC ACID, PLASMA   LACTIC ACID, PLASMA   TROPONIN I        All Lab Results:  Results for orders placed or performed during the hospital encounter of 04/22/24   CBC auto differential   Result Value Ref Range    WBC 17.33 (H) 3.90 - 12.70 K/uL    RBC 3.69 (L) 4.60 - 6.20 M/uL    Hemoglobin 11.5 (L) 14.0 - 18.0 g/dL    Hematocrit 34.5 (L) 40.0 - 54.0 %    MCV 94 82 - 98 fL    MCH 31.2 (H) 27.0 - 31.0 pg    MCHC 33.3 32.0 - 36.0 g/dL    RDW 12.7 11.5 - 14.5 %    Platelets 328 150 - 450 K/uL    MPV 9.8 9.2 - 12.9 fL    Immature Granulocytes 0.7  (H) 0.0 - 0.5 %    Gran # (ANC) 14.6 (H) 1.8 - 7.7 K/uL    Immature Grans (Abs) 0.12 (H) 0.00 - 0.04 K/uL    Lymph # 0.9 (L) 1.0 - 4.8 K/uL    Mono # 1.7 (H) 0.3 - 1.0 K/uL    Eos # 0.1 0.0 - 0.5 K/uL    Baso # 0.06 0.00 - 0.20 K/uL    nRBC 0 0 /100 WBC    Gran % 83.9 (H) 38.0 - 73.0 %    Lymph % 5.0 (L) 18.0 - 48.0 %    Mono % 9.6 4.0 - 15.0 %    Eosinophil % 0.5 0.0 - 8.0 %    Basophil % 0.3 0.0 - 1.9 %    Differential Method Automated    Comprehensive metabolic panel   Result Value Ref Range    Sodium 131 (L) 136 - 145 mmol/L    Potassium 4.7 3.5 - 5.1 mmol/L    Chloride 93 (L) 95 - 110 mmol/L    CO2 24 23 - 29 mmol/L    Glucose 129 (H) 70 - 110 mg/dL    BUN 19 8 - 23 mg/dL    Creatinine 0.8 0.5 - 1.4 mg/dL    Calcium 9.4 8.7 - 10.5 mg/dL    Total Protein 7.1 6.0 - 8.4 g/dL    Albumin 3.0 (L) 3.5 - 5.2 g/dL    Total Bilirubin 0.3 0.1 - 1.0 mg/dL    Alkaline Phosphatase 85 55 - 135 U/L    AST 28 10 - 40 U/L    ALT 31 10 - 44 U/L    eGFR >60.0 >60 mL/min/1.73 m^2    Anion Gap 14 8 - 16 mmol/L   Urinalysis, Reflex to Urine Culture Urine, Clean Catch    Specimen: Urine   Result Value Ref Range    Specimen UA Urine, Clean Catch     Color, UA Yellow Yellow, Straw, Ce    Appearance, UA Clear Clear    pH, UA 6.0 5.0 - 8.0    Specific Gravity, UA 1.015 1.005 - 1.030    Protein, UA Trace (A) Negative    Glucose, UA Negative Negative    Ketones, UA Negative Negative    Bilirubin (UA) Negative Negative    Occult Blood UA Negative Negative    Nitrite, UA Negative Negative    Urobilinogen, UA Negative <2.0 EU/dL    Leukocytes, UA Negative Negative   Procalcitonin   Result Value Ref Range    Procalcitonin 0.09 <0.25 ng/mL   Brain natriuretic peptide   Result Value Ref Range    BNP 85 0 - 99 pg/mL   Troponin I   Result Value Ref Range    Troponin I <0.006 0.000 - 0.026 ng/mL   Lactic acid, plasma   Result Value Ref Range    Lactate (Lactic Acid) 1.3 0.5 - 2.2 mmol/L   Lactic acid, plasma #2   Result Value Ref Range    Lactate  (Lactic Acid) 1.0 0.5 - 2.2 mmol/L   Troponin I   Result Value Ref Range    Troponin I 0.010 0.000 - 0.026 ng/mL   EKG 12-lead   Result Value Ref Range    QRS Duration 134 ms    [facility] QTC Calculation 463 ms           The EKG was ordered, reviewed, and independently interpreted by the ED provider:      ECG Results              EKG 12-lead (Final result)        Collection Time Result Time QRS Duration [facility] QTC Calculation    04/22/24 15:07:46 04/22/24 18:06:47 134 463                     Final result by Interface, Lab In [facility] [place] (04/22/24 18:06:56)                   Narrative:    Test Reason : R06.00,    Vent. Rate : 102 BPM     Atrial Rate : 102 BPM     P-R Int : 220 ms          QRS Dur : 134 ms      QT Int : 356 ms       P-R-T Axes : 067 037 065 degrees     QTc Int : 463 ms    Sinus tachycardia with 1st degree A-V block  Right bundle branch block  Abnormal ECG  When compared with ECG of 27-SEP-2023 01:43,  Sinus rhythm has replaced Wide QRS rhythm  Confirmed by YOLIE SQUIRES MD ([id number]) on 4/22/2024 6:06:45 PM    Referred By:             Confirmed By:YOLIE SQUIRES MD                      Wet Read by Kinza Laguerre DO (04/22/24 16:27:53, [facility] Emergency Dept, Emergency Medicine)    Sinus tachycardia.  Rate 102.  First-degree AV block.  Right bundle-branch block.  No ST segment elevation.  No STEMI.                                     Imaging Results:  Imaging Results              CTA Chest Non-Coronary (PE Studies) (Final result)  Result time 04/22/24 17:32:24      Final result by Keegan Olivier MD (04/22/24 17:32:24)                   Impression:      Extensive emphysema.  Moderate left-sided pleural effusion mild right-sided pleural effusion.  Moderate right basilar atelectasis/consolidation mild left basilar atelectasis/consolidation.  Suggestion of mass in the right lower lobe roseanne-fissural region.  Findings worrisome for malignancy.  Recommend clinical correlation and follow-up.    All CT scans   are  performed using dose optimization techniques including the following: automated exposure control; adjustment of the mA and/or kV; use of iterative reconstruction technique.  Dose modulation was employed for ALARA by means of: Automated exposure control; adjustment of the mA and/or kV according to patient size (this includes techniques or standardized protocols for targeted exams where dose is matched to indication/reason for exam; i.e. extremities or head); and/or use of iterative reconstructive technique.      Electronically signed by: Keegan Olivier  Date:    04/22/2024  Time:    17:32               Narrative:    EXAMINATION:  CTA CHEST NON CORONARY (PE STUDIES)    CLINICAL HISTORY:  Pulmonary embolism (PE) suspected, unknown D-dimer;    TECHNIQUE:  Low dose axial images, sagittal and coronal reformations were obtained from the thoracic inlet to the lung bases following the IV administration of 100 mL of Omnipaque 350.  Contrast timing was optimized to evaluate the pulmonary arteries.  MIP images were performed.    COMPARISON:  None    FINDINGS:  Mass in the right lower lobe in the Parul fissural region measures 6.2 by 10 cm worrisome for malignancy.  Extensive emphysema.  Moderate left-sided pleural effusion.  Spinal stimulator.  Moderate left-sided pleural effusion.  Mild left basilar atelectasis/consolidation.  Moderate right basilar atelectasis/consolidation and mild right basilar pleural effusion.  No pulmonary emboli.  No aortic dissection.  Atherosclerotic changes.                                       X-Ray Chest AP Portable (Final result)  Result time 04/22/24 15:52:04      Final result by Kulwinder Lopez MD (04/22/24 15:52:04)                   Impression:      See above.      Electronically signed by: Kulwinder Lopez MD  Date:    04/22/2024  Time:    15:52               Narrative:    EXAMINATION:  XR CHEST AP PORTABLE    CLINICAL HISTORY:  Cough and congestion, Cough;    COMPARISON:  09/27/2023  x-ray.    FINDINGS:  Thoracic spinal cord stimulator leads are noted.    Heart size is normal.  Mild aortic atherosclerosis is present    There is mild volume loss with vascular crowding in both lung bases.                                            The Emergency Provider reviewed the vital signs and test results, which are outlined above.     ED Discussion   ED Medication(s):  Medications   gabapentin capsule 600 mg (600 mg Oral Given 4/22/24 1532)   albuterol-ipratropium 2.5 mg-0.5 mg/3 mL nebulizer solution 3 mL (3 mLs Nebulization Given 4/22/24 1548)   gabapentin capsule 600 mg (600 mg Oral Given 4/22/24 1540)   methylPREDNISolone sodium succinate injection 125 mg (125 mg Intravenous Given 4/22/24 1659)   iohexoL (OMNIPAQUE 350) injection 100 mL (100 mLs Intravenous Given 4/22/24 1700)   traMADoL tablet 50 mg (50 mg Oral Given 4/22/24 1700)   cefTRIAXone (Rocephin) 1 g in dextrose 5 % in water (D5W) 100 mL IVPB (MB+) (0 g Intravenous Stopped 4/22/24 1848)       ED Course as of 04/22/24 2104 Mon Apr 22, 2024   1614 Lactic Acid Level: 1.3 [LB]   1614 Troponin I: <0.006 [LB]   1614 WBC(!): 17.33 [LB]   1614 Hemoglobin(!): 11.5 [LB]   1614 Hematocrit(!): 34.5 [LB]   1615 Gran %(!): 83.9 [LB]   1615 BUN: 19 [LB]   1615 Creatinine: 0.8 [LB]   1615 Sodium(!): 131 [LB]   1740 Extensive emphysema.  Moderate left-sided pleural effusion mild right-sided pleural effusion.  Moderate right basilar atelectasis/consolidation mild left basilar atelectasis/consolidation.  Suggestion of mass in the right lower lobe roseanne-fissural region.  Findings worrisome for malignancy.  Recommend clinical correlation and follow-up. [LB]   1813 Discussed findings mass, left greater than right pleural effusion, possible infiltrate, elevated white cell count.  This information was shared with patient, spouse, son, and son via phone Dr. Mayfield at [phone].   [facility].  He is followed by  Dr. Gant is Pulmonary.  Second choice is [facility] [LB]      ED Course User Index  [LB] Kinza LaguerreAlona, DO       18:00 Care to Dr. OLIMPIA Aceves.      9:02 PM Discussed lab/imaging studies with patient and the need for further evaluation/admission for pna. Pt verbalized understanding that this is a stand alone ER and we are unable to admit at this facility. Pt will be transferred to [facility] via [facility] with care en route to include Cm. I discussed this case with hs and care was accepted by Dr Burch.       Medical Decision Making                 Medical Decision Making  Differential diagnosis:  Pulmonary embolism, pneumonia, pleurisy, pleural effusion, atypical ACS    EKG:  No STEMI  Labs: Lactic 1.3.  WBCs 17.33, H/H 11.5/34.5.  Positive left shift.  Sodium 131.  Anion gap normal.  Procalcitonin 0.09.  BNP 85.  Troponin 0.006.  Imaging: Chest x-ray vascular crowding.  CT a extensive emphysema, moderate left-sided pleural effusion, mild right-sided pleural effusion.  Bilateral consolidations.  Suggestion of a mass in the right lower perifissural region 6 x 10 cm.    ED course: Blood cultures obtained.  Patient given his gabapentin, tramadol.  Good pain relief.  Lactic acid normal.  Rocephin 1 g given.  Given patient having increasing oxygen needs, pain, possible infiltrate, suspected right lung mass, decision was made to admit patient.    Amount and/or Complexity of Data Reviewed  Labs: ordered. Decision-making details documented in ED Course.  Radiology: ordered.    Risk  Prescription drug management.  Decision regarding hospitalization.        Coding    Prescription Management: I performed a review of the patient's current Rx medication list as input by nursing staff.    Patient's Medications   New Prescriptions    No medications on file   Previous Medications    ALBUTEROL (PROVENTIL/VENTOLIN HFA) 90 MCG/ACTUATION INHALER    Inhale into the lungs.    ASPIRIN 81 MG CHEW    Take 1 tablet by  "mouth every morning.    ATORVASTATIN (LIPITOR) 40 MG TABLET    Take 1 tablet by mouth every evening.    CYCLOBENZAPRINE (FLEXERIL) 10 MG TABLET    Take 10 mg by mouth.    FINASTERIDE (PROSCAR) 5 MG TABLET    Take 5 mg by mouth.    FUROSEMIDE (LASIX) 20 MG TABLET    Take 20 mg by mouth.    GABAPENTIN (NEURONTIN) 600 MG TABLET    Take by mouth.    HYDRALAZINE (APRESOLINE) 50 MG TABLET    Take 50 mg by mouth 2 (two) times daily.    TRELEGY ELLIPTA 100-62.5-25 MCG DSDV    Inhale 1 puff into the lungs.   Modified Medications    No medications on file   Discontinued Medications    No medications on file            Portions of this note may have been created with voice recognition software. Occasional "wrong-word" or "sound-a-like" substitutions may have occurred due to the inherent limitations of voice recognition software. Please, read the note carefully and recognize, using context, where substitutions have occurred.          Clinical Impression       ICD-10-CM ICD-9-CM   1. Pneumonia of both lungs due to infectious organism, unspecified part of lung  J18.9 483.8   2. Dyspnea  R06.00 786.09   3. Pleural effusion, bilateral  J90 511.9   4. Lung mass  R91.8 786.6   5. Pneumonia  J18.9 486        Disposition        Disposition: Admit to med-telemetry  Patient condition: Stable               Benja Aceves MD  04/22/24 [id number]    "

## 2024-04-22 NOTE — Clinical Note
Diagnosis: Pneumonia [604475]   Future Attending Provider: CORY GERMAIN [[id number]]   Reason for IP Medical Treatment  (Clinical interventions that can only be accomplished in the IP setting? ) :: Pneumonia associated c lung mass, effusions   I certify that Inpatient services for greater than or equal to 2 midnights are medically necessary:: Yes   Plans for Post-Acute care--if anticipated (pick the single best option):: A. No post acute care anticipated at this time   Special Needs:: No Special Needs [1]

## 2024-04-23 PROBLEM — J44.1 COPD EXACERBATION: Status: ACTIVE | Noted: 2024-04-23

## 2024-04-23 PROBLEM — J96.11 CHRONIC RESPIRATORY FAILURE WITH HYPOXIA: Status: ACTIVE | Noted: 2024-04-23

## 2024-04-23 PROBLEM — J96.21 ACUTE ON CHRONIC HYPOXIC RESPIRATORY FAILURE: Status: ACTIVE | Noted: 2024-04-23

## 2024-04-23 PROBLEM — R91.8 RIGHT LOWER LOBE LUNG MASS: Status: ACTIVE | Noted: 2024-04-23

## 2024-04-23 PROBLEM — R07.81 PLEURITIC CHEST PAIN: Status: ACTIVE | Noted: 2024-04-23

## 2024-04-23 PROBLEM — J18.9 PNEUMONIA: Status: ACTIVE | Noted: 2024-04-23

## 2024-04-23 PROBLEM — E87.1 HYPONATREMIA: Status: ACTIVE | Noted: 2024-04-23

## 2024-04-23 PROBLEM — J90 BILATERAL PLEURAL EFFUSION: Status: ACTIVE | Noted: 2024-04-23

## 2024-04-23 PROBLEM — D64.9 ANEMIA: Status: ACTIVE | Noted: 2024-04-23

## 2024-04-23 PROBLEM — I10 PRIMARY HYPERTENSION: Status: ACTIVE | Noted: 2024-04-23

## 2024-04-23 PROBLEM — I50.32 CHRONIC DIASTOLIC CONGESTIVE HEART FAILURE: Status: ACTIVE | Noted: 2024-04-23

## 2024-04-23 PROBLEM — N40.0 BPH (BENIGN PROSTATIC HYPERPLASIA): Status: ACTIVE | Noted: 2024-04-23

## 2024-04-23 PROBLEM — R07.1 CHEST PAIN ON BREATHING: Status: ACTIVE | Noted: 2024-04-23

## 2024-04-23 LAB
ADENOVIRUS: NOT DETECTED
ANION GAP SERPL CALC-SCNC: 11 MMOL/L (ref 8–16)
BACTERIA SPEC AEROBE CULT: NORMAL
BASOPHILS # BLD AUTO: 0.03 K/UL (ref 0–0.2)
BASOPHILS NFR BLD: 0.2 % (ref 0–1.9)
BORDETELLA PARAPERTUSSIS (IS1001): NOT DETECTED
BORDETELLA PERTUSSIS (PTXP): NOT DETECTED
BUN SERPL-MCNC: 19 MG/DL (ref 8–23)
CALCIUM SERPL-MCNC: 9.1 MG/DL (ref 8.7–10.5)
CEA SERPL-MCNC: 3.5 NG/ML (ref 0–5)
CHLAMYDIA PNEUMONIAE: NOT DETECTED
CHLORIDE SERPL-SCNC: 94 MMOL/L (ref 95–110)
CO2 SERPL-SCNC: 24 MMOL/L (ref 23–29)
CORONAVIRUS 229E, COMMON COLD VIRUS: NOT DETECTED
CORONAVIRUS HKU1, COMMON COLD VIRUS: NOT DETECTED
CORONAVIRUS NL63, COMMON COLD VIRUS: NOT DETECTED
CORONAVIRUS OC43, COMMON COLD VIRUS: NOT DETECTED
CREAT SERPL-MCNC: 0.8 MG/DL (ref 0.5–1.4)
DIFFERENTIAL METHOD BLD: ABNORMAL
EOSINOPHIL # BLD AUTO: 0 K/UL (ref 0–0.5)
EOSINOPHIL NFR BLD: 0 % (ref 0–8)
ERYTHROCYTE [DISTWIDTH] IN BLOOD BY AUTOMATED COUNT: 12.6 % (ref 11.5–14.5)
EST. GFR  (NO RACE VARIABLE): >60 ML/MIN/1.73 M^2
FERRITIN SERPL-MCNC: 1205 NG/ML (ref 20–300)
FLUBV RNA NPH QL NAA+NON-PROBE: NOT DETECTED
FOLATE SERPL-MCNC: 5.6 NG/ML (ref 4–24)
GLUCOSE SERPL-MCNC: 133 MG/DL (ref 70–110)
GRAM STN SPEC: NORMAL
GRAM STN SPEC: NORMAL
HCT VFR BLD AUTO: 30.8 % (ref 40–54)
HGB BLD-MCNC: 10.4 G/DL (ref 14–18)
HPIV1 RNA NPH QL NAA+NON-PROBE: NOT DETECTED
HPIV2 RNA NPH QL NAA+NON-PROBE: NOT DETECTED
HPIV3 RNA NPH QL NAA+NON-PROBE: NOT DETECTED
HPIV4 RNA NPH QL NAA+NON-PROBE: NOT DETECTED
HUMAN METAPNEUMOVIRUS: NOT DETECTED
IMM GRANULOCYTES # BLD AUTO: 0.11 K/UL (ref 0–0.04)
IMM GRANULOCYTES NFR BLD AUTO: 0.7 % (ref 0–0.5)
INFLUENZA A (SUBTYPES H1,H1-2009,H3): NOT DETECTED
IRON SERPL-MCNC: 15 UG/DL (ref 45–160)
LACTATE SERPL-SCNC: 1 MMOL/L (ref 0.5–2.2)
LYMPHOCYTES # BLD AUTO: 0.7 K/UL (ref 1–4.8)
LYMPHOCYTES NFR BLD: 4.7 % (ref 18–48)
MAGNESIUM SERPL-MCNC: 1.8 MG/DL (ref 1.6–2.6)
MCH RBC QN AUTO: 31.5 PG (ref 27–31)
MCHC RBC AUTO-ENTMCNC: 33.8 G/DL (ref 32–36)
MCV RBC AUTO: 93 FL (ref 82–98)
MONOCYTES # BLD AUTO: 1 K/UL (ref 0.3–1)
MONOCYTES NFR BLD: 6.3 % (ref 4–15)
MYCOPLASMA PNEUMONIAE: NOT DETECTED
NEUTROPHILS # BLD AUTO: 13.5 K/UL (ref 1.8–7.7)
NEUTROPHILS NFR BLD: 88.1 % (ref 38–73)
NRBC BLD-RTO: 0 /100 WBC
PLATELET # BLD AUTO: 314 K/UL (ref 150–450)
PMV BLD AUTO: 9.4 FL (ref 9.2–12.9)
POTASSIUM SERPL-SCNC: 4.4 MMOL/L (ref 3.5–5.1)
RBC # BLD AUTO: 3.3 M/UL (ref 4.6–6.2)
RESPIRATORY INFECTION PANEL SOURCE: NORMAL
RETICS/RBC NFR AUTO: 0.9 % (ref 0.4–2)
RSV RNA NPH QL NAA+NON-PROBE: NOT DETECTED
RV+EV RNA NPH QL NAA+NON-PROBE: NOT DETECTED
SARS-COV-2 RNA RESP QL NAA+PROBE: NOT DETECTED
SATURATED IRON: 8 % (ref 20–50)
SODIUM SERPL-SCNC: 129 MMOL/L (ref 136–145)
TOTAL IRON BINDING CAPACITY: 188 UG/DL (ref 250–450)
TRANSFERRIN SERPL-MCNC: 127 MG/DL (ref 200–375)
TROPONIN I SERPL DL<=0.01 NG/ML-MCNC: 0.01 NG/ML (ref 0–0.03)
VIT B12 SERPL-MCNC: 1164 PG/ML (ref 210–950)
WBC # BLD AUTO: 15.38 K/UL (ref 3.9–12.7)

## 2024-04-23 PROCEDURE — 11000001 HC ACUTE MED/SURG PRIVATE ROOM

## 2024-04-23 PROCEDURE — 80048 BASIC METABOLIC PNL TOTAL CA: CPT | Performed by: NURSE PRACTITIONER

## 2024-04-23 PROCEDURE — 99900035 HC TECH TIME PER 15 MIN (STAT)

## 2024-04-23 PROCEDURE — 87070 CULTURE OTHR SPECIMN AEROBIC: CPT | Performed by: INTERNAL MEDICINE

## 2024-04-23 PROCEDURE — 63600175 PHARM REV CODE 636 W HCPCS: Performed by: NURSE PRACTITIONER

## 2024-04-23 PROCEDURE — 87106 FUNGI IDENTIFICATION YEAST: CPT | Performed by: INTERNAL MEDICINE

## 2024-04-23 PROCEDURE — 87486 CHLMYD PNEUM DNA AMP PROBE: CPT | Performed by: NURSE PRACTITIONER

## 2024-04-23 PROCEDURE — 83735 ASSAY OF MAGNESIUM: CPT | Performed by: NURSE PRACTITIONER

## 2024-04-23 PROCEDURE — 27000221 HC OXYGEN, UP TO 24 HOURS

## 2024-04-23 PROCEDURE — 87205 SMEAR GRAM STAIN: CPT | Performed by: INTERNAL MEDICINE

## 2024-04-23 PROCEDURE — 25000003 PHARM REV CODE 250: Performed by: NURSE PRACTITIONER

## 2024-04-23 PROCEDURE — 82746 ASSAY OF FOLIC ACID SERUM: CPT | Performed by: NURSE PRACTITIONER

## 2024-04-23 PROCEDURE — 85025 COMPLETE CBC W/AUTO DIFF WBC: CPT | Performed by: NURSE PRACTITIONER

## 2024-04-23 PROCEDURE — 25000242 PHARM REV CODE 250 ALT 637 W/ HCPCS: Performed by: INTERNAL MEDICINE

## 2024-04-23 PROCEDURE — 94761 N-INVAS EAR/PLS OXIMETRY MLT: CPT

## 2024-04-23 PROCEDURE — 82607 VITAMIN B-12: CPT | Performed by: NURSE PRACTITIONER

## 2024-04-23 PROCEDURE — 82378 CARCINOEMBRYONIC ANTIGEN: CPT | Performed by: NURSE PRACTITIONER

## 2024-04-23 PROCEDURE — 36415 COLL VENOUS BLD VENIPUNCTURE: CPT | Performed by: NURSE PRACTITIONER

## 2024-04-23 PROCEDURE — 83540 ASSAY OF IRON: CPT | Performed by: NURSE PRACTITIONER

## 2024-04-23 PROCEDURE — 25000003 PHARM REV CODE 250: Performed by: INTERNAL MEDICINE

## 2024-04-23 PROCEDURE — 87798 DETECT AGENT NOS DNA AMP: CPT | Performed by: NURSE PRACTITIONER

## 2024-04-23 PROCEDURE — 84484 ASSAY OF TROPONIN QUANT: CPT | Performed by: NURSE PRACTITIONER

## 2024-04-23 PROCEDURE — 94640 AIRWAY INHALATION TREATMENT: CPT

## 2024-04-23 PROCEDURE — 87205 SMEAR GRAM STAIN: CPT | Mod: 59 | Performed by: INTERNAL MEDICINE

## 2024-04-23 PROCEDURE — 85045 AUTOMATED RETICULOCYTE COUNT: CPT | Performed by: NURSE PRACTITIONER

## 2024-04-23 PROCEDURE — 87449 NOS EACH ORGANISM AG IA: CPT | Performed by: NURSE PRACTITIONER

## 2024-04-23 PROCEDURE — 63600175 PHARM REV CODE 636 W HCPCS: Mod: JZ,JG | Performed by: INTERNAL MEDICINE

## 2024-04-23 PROCEDURE — 87070 CULTURE OTHR SPECIMN AEROBIC: CPT | Mod: 59 | Performed by: INTERNAL MEDICINE

## 2024-04-23 PROCEDURE — 83605 ASSAY OF LACTIC ACID: CPT | Performed by: NURSE PRACTITIONER

## 2024-04-23 PROCEDURE — 82728 ASSAY OF FERRITIN: CPT | Performed by: NURSE PRACTITIONER

## 2024-04-23 RX ORDER — MAGNESIUM SULFATE HEPTAHYDRATE 40 MG/ML
2 INJECTION, SOLUTION INTRAVENOUS
Status: COMPLETED | OUTPATIENT
Start: 2024-04-23 | End: 2024-04-23

## 2024-04-23 RX ORDER — CYCLOBENZAPRINE HCL 10 MG
10 TABLET ORAL 3 TIMES DAILY PRN
Status: DISCONTINUED | OUTPATIENT
Start: 2024-04-23 | End: 2024-04-27

## 2024-04-23 RX ORDER — FINASTERIDE 5 MG/1
5 TABLET, FILM COATED ORAL DAILY
Status: DISCONTINUED | OUTPATIENT
Start: 2024-04-23 | End: 2024-05-02 | Stop reason: HOSPADM

## 2024-04-23 RX ORDER — BISACODYL 10 MG/1
10 SUPPOSITORY RECTAL DAILY PRN
Status: DISCONTINUED | OUTPATIENT
Start: 2024-04-23 | End: 2024-04-27

## 2024-04-23 RX ORDER — POLYETHYLENE GLYCOL 3350 17 G/17G
17 POWDER, FOR SOLUTION ORAL DAILY
Status: DISCONTINUED | OUTPATIENT
Start: 2024-04-23 | End: 2024-04-24

## 2024-04-23 RX ORDER — GABAPENTIN 400 MG/1
1200 CAPSULE ORAL 3 TIMES DAILY
Status: DISCONTINUED | OUTPATIENT
Start: 2024-04-23 | End: 2024-05-02 | Stop reason: HOSPADM

## 2024-04-23 RX ORDER — MORPHINE SULFATE 2 MG/ML
2 INJECTION, SOLUTION INTRAMUSCULAR; INTRAVENOUS EVERY 4 HOURS PRN
Status: DISCONTINUED | OUTPATIENT
Start: 2024-04-23 | End: 2024-04-27

## 2024-04-23 RX ORDER — IPRATROPIUM BROMIDE 0.5 MG/2.5ML
0.5 SOLUTION RESPIRATORY (INHALATION) EVERY 6 HOURS
Status: DISCONTINUED | OUTPATIENT
Start: 2024-04-23 | End: 2024-04-24

## 2024-04-23 RX ORDER — TRAMADOL HYDROCHLORIDE 50 MG/1
50 TABLET ORAL EVERY 6 HOURS PRN
Status: DISCONTINUED | OUTPATIENT
Start: 2024-04-23 | End: 2024-05-02 | Stop reason: HOSPADM

## 2024-04-23 RX ORDER — TALC
6 POWDER (GRAM) TOPICAL NIGHTLY PRN
Status: DISCONTINUED | OUTPATIENT
Start: 2024-04-23 | End: 2024-05-02 | Stop reason: HOSPADM

## 2024-04-23 RX ORDER — ONDANSETRON HYDROCHLORIDE 2 MG/ML
4 INJECTION, SOLUTION INTRAVENOUS EVERY 8 HOURS PRN
Status: DISCONTINUED | OUTPATIENT
Start: 2024-04-23 | End: 2024-05-02 | Stop reason: HOSPADM

## 2024-04-23 RX ORDER — SODIUM CHLORIDE 9 MG/ML
INJECTION, SOLUTION INTRAVENOUS CONTINUOUS
Status: DISCONTINUED | OUTPATIENT
Start: 2024-04-23 | End: 2024-04-23

## 2024-04-23 RX ORDER — SODIUM CHLORIDE 9 MG/ML
INJECTION, SOLUTION INTRAVENOUS CONTINUOUS
Status: ACTIVE | OUTPATIENT
Start: 2024-04-23 | End: 2024-04-23

## 2024-04-23 RX ORDER — SODIUM CHLORIDE 0.9 % (FLUSH) 0.9 %
10 SYRINGE (ML) INJECTION EVERY 12 HOURS PRN
Status: DISCONTINUED | OUTPATIENT
Start: 2024-04-23 | End: 2024-05-02 | Stop reason: HOSPADM

## 2024-04-23 RX ORDER — PSEUDOEPHEDRINE/ACETAMINOPHEN 30MG-500MG
100 TABLET ORAL
Status: COMPLETED | OUTPATIENT
Start: 2024-04-23 | End: 2024-04-23

## 2024-04-23 RX ORDER — KETOROLAC TROMETHAMINE 30 MG/ML
15 INJECTION, SOLUTION INTRAMUSCULAR; INTRAVENOUS ONCE
Status: COMPLETED | OUTPATIENT
Start: 2024-04-23 | End: 2024-04-23

## 2024-04-23 RX ORDER — IPRATROPIUM BROMIDE AND ALBUTEROL SULFATE 2.5; .5 MG/3ML; MG/3ML
3 SOLUTION RESPIRATORY (INHALATION) EVERY 6 HOURS
Status: DISCONTINUED | OUTPATIENT
Start: 2024-04-23 | End: 2024-04-23

## 2024-04-23 RX ORDER — SYRING-NEEDL,DISP,INSUL,0.3 ML 29 G X1/2"
296 SYRINGE, EMPTY DISPOSABLE MISCELLANEOUS ONCE
Status: COMPLETED | OUTPATIENT
Start: 2024-04-23 | End: 2024-04-23

## 2024-04-23 RX ORDER — HYDRALAZINE HYDROCHLORIDE 25 MG/1
50 TABLET, FILM COATED ORAL 2 TIMES DAILY
Status: DISCONTINUED | OUTPATIENT
Start: 2024-04-23 | End: 2024-04-28

## 2024-04-23 RX ORDER — ARFORMOTEROL TARTRATE 15 UG/2ML
15 SOLUTION RESPIRATORY (INHALATION) 2 TIMES DAILY
Status: DISCONTINUED | OUTPATIENT
Start: 2024-04-23 | End: 2024-04-24

## 2024-04-23 RX ORDER — SYRING-NEEDL,DISP,INSUL,0.3 ML 29 G X1/2"
296 SYRINGE, EMPTY DISPOSABLE MISCELLANEOUS
Status: COMPLETED | OUTPATIENT
Start: 2024-04-23 | End: 2024-04-23

## 2024-04-23 RX ORDER — BUDESONIDE 0.5 MG/2ML
0.5 INHALANT ORAL EVERY 12 HOURS
Status: DISCONTINUED | OUTPATIENT
Start: 2024-04-23 | End: 2024-05-02 | Stop reason: HOSPADM

## 2024-04-23 RX ORDER — ATORVASTATIN CALCIUM 40 MG/1
40 TABLET, FILM COATED ORAL NIGHTLY
Status: DISCONTINUED | OUTPATIENT
Start: 2024-04-23 | End: 2024-04-27

## 2024-04-23 RX ORDER — ACETAMINOPHEN 325 MG/1
650 TABLET ORAL EVERY 6 HOURS PRN
Status: DISCONTINUED | OUTPATIENT
Start: 2024-04-23 | End: 2024-05-02 | Stop reason: HOSPADM

## 2024-04-23 RX ORDER — GABAPENTIN 300 MG/1
600 CAPSULE ORAL 3 TIMES DAILY
Status: DISCONTINUED | OUTPATIENT
Start: 2024-04-23 | End: 2024-04-23

## 2024-04-23 RX ADMIN — MELATONIN TAB 3 MG 6 MG: 3 TAB at 12:04

## 2024-04-23 RX ADMIN — GABAPENTIN 1200 MG: 400 CAPSULE ORAL at 01:04

## 2024-04-23 RX ADMIN — IPRATROPIUM BROMIDE 0.5 MG: 0.5 SOLUTION RESPIRATORY (INHALATION) at 01:04

## 2024-04-23 RX ADMIN — MORPHINE SULFATE 2 MG: 2 INJECTION, SOLUTION INTRAMUSCULAR; INTRAVENOUS at 08:04

## 2024-04-23 RX ADMIN — MORPHINE SULFATE 2 MG: 2 INJECTION, SOLUTION INTRAMUSCULAR; INTRAVENOUS at 02:04

## 2024-04-23 RX ADMIN — GABAPENTIN 1200 MG: 400 CAPSULE ORAL at 02:04

## 2024-04-23 RX ADMIN — BUDESONIDE INHALATION 0.5 MG: 0.5 SUSPENSION RESPIRATORY (INHALATION) at 07:04

## 2024-04-23 RX ADMIN — KETOROLAC TROMETHAMINE 15 MG: 30 INJECTION, SOLUTION INTRAMUSCULAR at 08:04

## 2024-04-23 RX ADMIN — GABAPENTIN 1200 MG: 400 CAPSULE ORAL at 08:04

## 2024-04-23 RX ADMIN — MAGNESIUM SULFATE HEPTAHYDRATE 2 G: 40 INJECTION, SOLUTION INTRAVENOUS at 11:04

## 2024-04-23 RX ADMIN — HYDRALAZINE HYDROCHLORIDE 50 MG: 25 TABLET, FILM COATED ORAL at 12:04

## 2024-04-23 RX ADMIN — HYDRALAZINE HYDROCHLORIDE 50 MG: 25 TABLET, FILM COATED ORAL at 08:04

## 2024-04-23 RX ADMIN — TRAMADOL HYDROCHLORIDE 50 MG: 50 TABLET, COATED ORAL at 12:04

## 2024-04-23 RX ADMIN — MAGNESIUM CITRATE 296 ML: 1.75 LIQUID ORAL at 06:04

## 2024-04-23 RX ADMIN — AZITHROMYCIN MONOHYDRATE 500 MG: 500 INJECTION, POWDER, LYOPHILIZED, FOR SOLUTION INTRAVENOUS at 01:04

## 2024-04-23 RX ADMIN — MAGNESIUM SULFATE HEPTAHYDRATE 2 G: 40 INJECTION, SOLUTION INTRAVENOUS at 09:04

## 2024-04-23 RX ADMIN — MAGNESIUM CITRATE 296 ML: 1.75 LIQUID ORAL at 01:04

## 2024-04-23 RX ADMIN — SODIUM CHLORIDE: 9 INJECTION, SOLUTION INTRAVENOUS at 01:04

## 2024-04-23 RX ADMIN — TRAMADOL HYDROCHLORIDE 50 MG: 50 TABLET, COATED ORAL at 07:04

## 2024-04-23 RX ADMIN — GABAPENTIN 600 MG: 300 CAPSULE ORAL at 12:04

## 2024-04-23 RX ADMIN — Medication 100 ML: at 06:04

## 2024-04-23 RX ADMIN — POLYETHYLENE GLYCOL 3350 17 G: 17 POWDER, FOR SOLUTION ORAL at 08:04

## 2024-04-23 RX ADMIN — ATORVASTATIN CALCIUM 40 MG: 40 TABLET, FILM COATED ORAL at 08:04

## 2024-04-23 RX ADMIN — IPRATROPIUM BROMIDE 0.5 MG: 0.5 SOLUTION RESPIRATORY (INHALATION) at 12:04

## 2024-04-23 RX ADMIN — MELATONIN TAB 3 MG 6 MG: 3 TAB at 08:04

## 2024-04-23 RX ADMIN — CEFTRIAXONE 2 G: 2 INJECTION, POWDER, FOR SOLUTION INTRAMUSCULAR; INTRAVENOUS at 05:04

## 2024-04-23 RX ADMIN — SODIUM CHLORIDE 500 ML: 9 INJECTION, SOLUTION INTRAVENOUS at 06:04

## 2024-04-23 RX ADMIN — ATORVASTATIN CALCIUM 40 MG: 40 TABLET, FILM COATED ORAL at 12:04

## 2024-04-23 RX ADMIN — FINASTERIDE 5 MG: 5 TABLET, FILM COATED ORAL at 08:04

## 2024-04-23 RX ADMIN — ARFORMOTEROL TARTRATE 15 MCG: 15 SOLUTION RESPIRATORY (INHALATION) at 07:04

## 2024-04-23 RX ADMIN — IPRATROPIUM BROMIDE 0.5 MG: 0.5 SOLUTION RESPIRATORY (INHALATION) at 07:04

## 2024-04-23 NOTE — PROGRESS NOTES
[facility]  [facility] Medicine  Progress Note    Patient Name: Jason Mayfield III  MRN: [id number]  Patient Class: IP- Inpatient   Admission Date: 4/22/2024  Length of Stay: 1 days  Attending Physician: Gisella Aguilar MD  Primary Care Provider: TANNER Mane MD        Subjective:     Principal Problem:Chronic respiratory failure with hypoxia        HPI:  Patient is 83-year-old male with past medical history significant for hypertension, NSTEMI, diastolic congestive heart failure, COPD, chronic hypoxic respiratory failure on home O2 @ 2L/min NC, BPH,  fibroadenoma of breast, hypothyroidism, infrarenal AAA(3.4 cm),  chronic back pain, neuropathy, shingles infection, kidney stones, colonic polyps, daily alcohol use, and former tobacco abuse who presented to ED with complaints of chest pain and dyspnea.  He reports  that for the past 2-3 days he has been having left upper chest pain, moderate, worsened with deep breathing, chronic cough, with no radiation into neck, jaw, or arms.  He also reports some generalized weakness and trouble walking over the past 2-3 days as well.  He reports some wheezing although states that this is his baseline. He denies fever, chills , abdominal pain, nausea, vomiting, diarrhea, leg pain, dysuria, or worsening edema. he states that he is normally on 2 L per minute nasal cannula, however over the past couple of days he has turned it up to 3-4 liters/minute.  He is followed by Dr. Gant.  Vital signs on arrival to ED-[facility]  with 98.3 temp, heart rate 107, respiratory rate 25, blood pressure 135/62, 91% SpO2 on 4 liters/minute nasal cannula.  He has remained afebrile while in ED. oxygen has been weaned to 3 liters/minute nasal cannula with SpO2 96%.  Lab workup reveals WBC 17.33, hemoglobin 11.5, hematocrit 34.5, sodium 131, potassium 4.7, chloride 93, CO2 24, BUN 19, creatinine 0.8, glucose 129, normal LFTs, BNP 85, troponin 0.010, lactic acid 1.0, procalcitonin 0.09,   and normal urinalysis.  EKG:  Sinus tach with first-degree AV block and right bundle-branch block, heart rate 102.  Chest x-ray notes thoracic spinal cord stimulator leads, normal heart size, mild aortic atherosclerosis, mild volume loss with vascular crowding in both lung bases.  CTA of chest shows extensive emphysema, moderate left-sided pleural effusion, mild right-sided pleural effusion, moderate right basilar atelectasis versus consolidation and mild left basilar atelectasis versus consolidation, suggestion of mass in the right lower lobe perifissural region measuring 6.2 x 10 cm, worrisome for malignancy.  He was given Rocephin, DuoNeb, Solu-Medrol, gabapentin, and tramadol while in ED. Hospital Medicine was consulted for admission due to worsening hypoxic respiratory failure, pneumonia, and newly discovered lung mass.    Overview/Hospital Course:  No notes on file    Interval History:  Patient seen and examined with wife at bedside.  Discussed with Pulmonary critical Care.  Patient continues to complain significant pain left upper quadrant.  Reports this has been present for several days in his not improved.  He also continues to complain of shortness a breath.    Review of Systems   Constitutional:  Positive for activity change, appetite change and fatigue. Negative for fever.   Respiratory:  Positive for cough, chest tightness and shortness of breath.    Cardiovascular:  Negative for chest pain and leg swelling.   Gastrointestinal:  Positive for abdominal distention, abdominal pain and constipation.   Neurological:  Positive for weakness.   All other systems reviewed and are negative.    Objective:     Vital Signs (Most Recent):  Temp: 98.2 °F (36.8 °C) (04/23/24 1235)  Pulse: 78 (04/23/24 1527)  Resp: 18 (04/23/24 1235)  BP: (!) 122/58 (04/23/24 1235)  SpO2: 95 % (04/23/24 1235) Vital Signs (24h Range):  Temp:  [97.5 °F (36.4 °C)-98.6 °F (37 °C)] 98.2 °F (36.8 °C)  Pulse:  [] 78  Resp:  [18-25]  18  SpO2:  [84 %-97 %] 95 %  BP: ()/(51-68) 122/58     Weight: 80.6 kg (177 lb 11.1 oz)  Body mass index is 24.78 kg/m².    Intake/Output Summary (Last 24 hours) at 4/23/2024 1646  Last data filed at 4/23/2024 1252  Gross per 24 hour   Intake 100 ml   Output 400 ml   Net -300 ml         Physical Exam  Vitals reviewed.   Constitutional:       Appearance: He is ill-appearing.   HENT:      Head: Normocephalic and atraumatic.      Mouth/Throat:      Mouth: Mucous membranes are moist.      Pharynx: Oropharynx is clear.   Eyes:      Extraocular Movements: Extraocular movements intact.      Conjunctiva/sclera: Conjunctivae normal.   Cardiovascular:      Rate and Rhythm: Normal rate and regular rhythm.      Pulses: Normal pulses.      Heart sounds: Murmur heard.   Pulmonary:      Effort: Respiratory distress present.      Breath sounds: Rhonchi present.   Chest:      Chest wall: Tenderness (tenderness to palpation in the left anterior lower chest) present.   Abdominal:      General: Bowel sounds are normal. There is distension.      Palpations: Abdomen is soft. There is no mass.      Tenderness: There is abdominal tenderness. There is no right CVA tenderness, left CVA tenderness, guarding or rebound.   Musculoskeletal:         General: Normal range of motion.      Cervical back: Normal range of motion and neck supple.   Skin:     General: Skin is warm and dry.   Neurological:      Mental Status: He is alert and oriented to person, place, and time. Mental status is at baseline.   Psychiatric:         Mood and Affect: Mood normal.         Behavior: Behavior normal.         Thought Content: Thought content normal.             Significant Labs: All pertinent labs within the past 24 hours have been reviewed.    Blood Culture:   Recent Labs   Lab 04/22/24  1523 04/22/24  1544   LABBLOO No Growth to date No Growth to date     CBC:   Recent Labs   Lab 04/22/24  1520 04/23/24  0529   WBC 17.33* 15.38*   HGB 11.5* 10.4*   HCT  34.5* 30.8*    314     CMP:   Recent Labs   Lab 04/22/24  1520 04/23/24  0529   * 129*   K 4.7 4.4   CL 93* 94*   CO2 24 24   * 133*   BUN 19 19   CREATININE 0.8 0.8   CALCIUM 9.4 9.1   PROT 7.1  --    ALBUMIN 3.0*  --    BILITOT 0.3  --    ALKPHOS 85  --    AST 28  --    ALT 31  --    ANIONGAP 14 11     Cardiac Markers:   Recent Labs   Lab 04/22/24  1520   BNP 85       Lactic Acid:   Recent Labs   Lab 04/22/24  1542 04/22/24  1937 04/23/24  0842   LACTATE 1.3 1.0 1.0       Magnesium:   Recent Labs   Lab 04/23/24  0529   MG 1.8     Respiratory Culture:   Recent Labs   Lab 04/23/24  0026   GSRESP >10epis/lfp and <than many WBC's  Predominance of oropharyngeal brian. Please recollect.   RESPIRATORYC Specimen inadequate - culture not performed     Troponin:   Recent Labs   Lab 04/22/24  1520 04/22/24  1807 04/23/24  0529   TROPONINI <0.006 0.010 0.008     TSH:   Recent Labs   Lab 02/12/24  1527   TSH 2.89       KUB reveals significant stool in the right colon  CT scan of abdomen and pelvis pending  Significant Imaging: I have reviewed all pertinent imaging results/findings within the past 24 hours.    Assessment/Plan:      * Chronic respiratory failure with hypoxia  Patient with Hypoxic Respiratory failure which is Acute on chronic.  he is on home oxygen at 2 LPM. Supplemental oxygen was provided and noted-      .   Signs/symptoms of respiratory failure include- tachypnea, increased work of breathing, use of accessory muscles, and wheezing. Contributing diagnoses includes - COPD, Pleural effusion, Pneumonia, and lung mass  Labs and images were reviewed. Patient Has not had a recent ABG. Will treat underlying causes and adjust management of respiratory failure as follows-     Was given Solu-medrol x one dose per ED, defer additional steroids at this time, no significant wheezing noted on exam  Supplemental oxygen increased to 3 L/min NC at this time    Continue supplemental oxygen.  We will treat  with antibiotics, nebs, pulmonary hygiene    Anemia  Patient's anemia is currently controlled. Has not received any PRBCs to date. Etiology likely d/t  unknown, work up in progress  Current CBC reviewed-   Lab Results   Component Value Date    HGB 10.4 (L) 04/23/2024    HCT 30.8 (L) 04/23/2024     Monitor serial CBC and transfuse if patient becomes hemodynamically unstable, symptomatic or H/H drops below 7/21.    This is new finding, had normal H&H 6 months ago  Iron-deficiency, CEA negative  Check stool for occult blood      Hyponatremia  Patient has hyponatremia which is controlled,We will aim to correct the sodium by 4-6mEq in 24 hours. We will monitor sodium Daily. The hyponatremia is due to Dehydration/hypovolemia. We will treat the hyponatremia with IV fluids as follows: NS at 75 ml/hr. The patient's sodium results have been reviewed and are listed below.  Recent Labs   Lab 04/23/24  0529   *     We will continue to trend    Bilateral pleural effusion  Patient found to have moderate pleural effusion on imaging. I have personally reviewed and interpreted the following imaging: Xray and CT. A thoracentesis was deferred pending pulmonology consultation.  Most likely etiology includes  possible malignancy with new lung mass discovered on CT scan, and pneumonia    Holding off on diuresis at this time, as he appears dry  Patient given IV fluids Kannan assess in a.m.      BPH (benign prostatic hyperplasia)  Continue finasteride      Chronic diastolic congestive heart failure  Patient is identified as having Diastolic (HFpEF) heart failure that is Chronic. CHF is currently controlled. Latest ECHO performed and demonstrates- No results found for this or any previous visit.  Monitor clinical status closely. Monitor on telemetry. Patient is off CHF pathway.  Monitor strict Is&Os and daily weights.  Fluid restriction not indicated at this time. Cardiology has not been consulted. Continue to stress to patient  importance of self efficacy and  on diet for CHF. Last BNP reviewed- and noted below   Recent Labs   Lab 04/22/24  1520   BNP 85       Holding home Lasix 20 mg daily at this time as patient appears dry on exam    COPD exacerbation  Patient's COPD is with exacerbation noted by continued dyspnea and worsening of baseline hypoxia currently.  Patient is currently off COPD Pathway. Continue scheduled inhalers Antibiotics and Supplemental oxygen and monitor respiratory status closely.   Was given 1 dose of IV Solu-Medrol with significant improvement, we will hold off on additional steroids at this time, add back PRN  Nebs ordered every 6 hours    Primary hypertension  Chronic, controlled. Latest blood pressure and vitals reviewed-     Temp:  [97.5 °F (36.4 °C)-98.6 °F (37 °C)]   Pulse:  []   Resp:  [18-25]   BP: ()/(51-68)   SpO2:  [84 %-97 %] .   Home meds for hypertension were reviewed and noted below.   Hypertension Medications               furosemide (LASIX) 20 MG tablet Take 20 mg by mouth.    hydrALAZINE (APRESOLINE) 50 MG tablet Take 50 mg by mouth 2 (two) times daily.            While in the hospital, will manage blood pressure as follows; trend    Will utilize p.r.n. blood pressure medication only if patient's blood pressure greater than 180/110 and he develops symptoms such as worsening chest pain or shortness of breath.    Pleuritic chest pain  Likely related to pneumonia/pleural effusions and coughing  Initial troponin is normal, we will trend  EKG reviewed, sinus tach with first-degree AV block and right bundle branch block, no concerning ST or T-wave changes  Cardiac monitoring    Questionably related to distended abdomen      Pneumonia  We will treat for pneumonia with Rocephin and Zithromax at this time  Follow-up blood cultures  Sputum culture ordered, patient does have productive sounding cough however difficult to bring up secretions  Monitor chest x-ray      Right lower lobe lung  mass  Mass in the right lower lobe in the roseanne fissural region measures 6.2 by 10 cm worrisome for malignancy  Pulmonology consulted  Awaiting abdominal evaluation        VTE Risk Mitigation (From admission, onward)           Ordered     Reason for No Pharmacological VTE Prophylaxis  Once        Comments: Possible procedure   Question:  Reasons:  Answer:  Physician Provided (leave comment)    04/23/24 0030     IP VTE HIGH RISK PATIENT  Once         04/23/24 0030     Place sequential compression device  Until discontinued         04/23/24 0030                    Discharge Planning   ERIKA:      Code Status: Full Code   Is the patient medically ready for discharge?:     Reason for patient still in hospital (select all that apply): Patient trending condition, Treatment, Imaging, Consult recommendations, and Pending disposition  Discharge Plan A: Home with family, Home Health                  Gisella Callejas MD  Department of Hospital Medicine   [facility]

## 2024-04-23 NOTE — PLAN OF CARE
[facility]  Initial Discharge Assessment       Primary Care Provider: TANNER Mane MD    Admission Diagnosis: Lung mass [R91.8]  Pneumonia [J18.9]  Dyspnea [R06.00]  Pleural effusion, bilateral [J90]  Pneumonia of both lungs due to infectious organism, unspecified part of lung [J18.9]    Admission Date: 4/22/2024  Expected Discharge Date:     Transition of Care Barriers: None    Payor: MEDICARE / Plan: MEDICARE PART A & B / Product Type: Government /     Extended Emergency Contact Information  Primary Emergency Contact: CHAPINTERELL,[place]  Mobile Phone: [phone]  Relation: Spouse    Discharge Plan A: Home with family, Home Health         [facility] - [place], [place] - [address] [address]  [address] [address]  [place] [address]  Phone: [phone] Fax: [phone]      Initial Assessment (most recent)       Adult Discharge Assessment - 04/23/24 1334          Discharge Assessment    Assessment Type Discharge Planning Assessment     Confirmed/corrected address, phone number and insurance Yes     Confirmed Demographics Correct on Facesheet     Source of Information patient     Communicated ERIKA with patient/caregiver Date not available/Unable to determine     Reason For Admission respiratory failure     People in Home spouse     Facility Arrived From: home     Do you expect to return to your current living situation? Yes     Do you have help at home or someone to help you manage your care at home? Yes     Who are your caregiver(s) and their phone number(s)? spouse     Prior to hospitilization cognitive status: Alert/Oriented     Current cognitive status: Alert/Oriented     Walking or Climbing Stairs Difficulty yes     Mobility Management rollator and cane     Dressing/Bathing Difficulty no     Equipment Currently Used at Home grab bar;rollator;cane, straight;oxygen     Readmission within 30 days? No     Patient currently being followed by outpatient case management? No     Do you currently have  service(s) that help you manage your care at home? No     Do you take prescription medications? Yes     Do you have prescription coverage? Yes     Do you have any problems affording any of your prescribed medications? TBD     Is the patient taking medications as prescribed? yes     Who is going to help you get home at discharge? spouse     How do you get to doctors appointments? car, drives self;family or friend will provide     Are you on dialysis? No     Discharge Plan A Home with family;Home Health     DME Needed Upon Discharge  none     Discharge Plan discussed with: Patient     Transition of Care Barriers None        Housing Stability    In the last 12 months, was there a time when you were not able to pay the mortgage or rent on time? No     At any time in the past 12 months, were you homeless or living in a shelter (including now)? No        Transportation Needs    In the past 12 months, has lack of transportation kept you from medical appointments or from getting medications? No     In the past 12 months, has lack of transportation kept you from meetings, work, or from getting things needed for daily living? No                   Anticipated DC Dispo: home with wife, may benefit from HHC  Prior Level of Function: independent in ADLs, use of cane/rollator for mobility  PCP: Johnson Mane MD ([facility])  Comments:  CM met with patient at bedside to introduce role and discuss d/c planning. Patient is independent, lives with spouse who will provide transportation home. Patient has Home Oxygen and confirms wife will be able to bring portable tank upon discharge. He is unsure of the DME agency for home oxygen at this time. Patient states he is a , but has not enrolled in benefits at this time. CM added [facility] application contact info to AVS for patient.

## 2024-04-23 NOTE — SUBJECTIVE & OBJECTIVE
Interval History:  Patient seen and examined with wife at bedside.  Discussed with Pulmonary critical Care.  Patient continues to complain significant pain left upper quadrant.  Reports this has been present for several days in his not improved.  He also continues to complain of shortness a breath.    Review of Systems   Constitutional:  Positive for activity change, appetite change and fatigue. Negative for fever.   Respiratory:  Positive for cough, chest tightness and shortness of breath.    Cardiovascular:  Negative for chest pain and leg swelling.   Gastrointestinal:  Positive for abdominal distention, abdominal pain and constipation.   Neurological:  Positive for weakness.   All other systems reviewed and are negative.    Objective:     Vital Signs (Most Recent):  Temp: 98.2 °F (36.8 °C) (04/23/24 1235)  Pulse: 78 (04/23/24 1527)  Resp: 18 (04/23/24 1235)  BP: (!) 122/58 (04/23/24 1235)  SpO2: 95 % (04/23/24 1235) Vital Signs (24h Range):  Temp:  [97.5 °F (36.4 °C)-98.6 °F (37 °C)] 98.2 °F (36.8 °C)  Pulse:  [] 78  Resp:  [18-25] 18  SpO2:  [84 %-97 %] 95 %  BP: ()/(51-68) 122/58     Weight: 80.6 kg (177 lb 11.1 oz)  Body mass index is 24.78 kg/m².    Intake/Output Summary (Last 24 hours) at 4/23/2024 1646  Last data filed at 4/23/2024 1252  Gross per 24 hour   Intake 100 ml   Output 400 ml   Net -300 ml         Physical Exam  Vitals reviewed.   Constitutional:       Appearance: He is ill-appearing.   HENT:      Head: Normocephalic and atraumatic.      Mouth/Throat:      Mouth: Mucous membranes are moist.      Pharynx: Oropharynx is clear.   Eyes:      Extraocular Movements: Extraocular movements intact.      Conjunctiva/sclera: Conjunctivae normal.   Cardiovascular:      Rate and Rhythm: Normal rate and regular rhythm.      Pulses: Normal pulses.      Heart sounds: Murmur heard.   Pulmonary:      Effort: Respiratory distress present.      Breath sounds: Rhonchi present.   Chest:      Chest wall:  Tenderness (tenderness to palpation in the left anterior lower chest) present.   Abdominal:      General: Bowel sounds are normal. There is distension.      Palpations: Abdomen is soft. There is no mass.      Tenderness: There is abdominal tenderness. There is no right CVA tenderness, left CVA tenderness, guarding or rebound.   Musculoskeletal:         General: Normal range of motion.      Cervical back: Normal range of motion and neck supple.   Skin:     General: Skin is warm and dry.   Neurological:      Mental Status: He is alert and oriented to person, place, and time. Mental status is at baseline.   Psychiatric:         Mood and Affect: Mood normal.         Behavior: Behavior normal.         Thought Content: Thought content normal.             Significant Labs: All pertinent labs within the past 24 hours have been reviewed.    Blood Culture:   Recent Labs   Lab 04/22/24  1523 04/22/24  1544   LABBLOO No Growth to date No Growth to date     CBC:   Recent Labs   Lab 04/22/24  1520 04/23/24  0529   WBC 17.33* 15.38*   HGB 11.5* 10.4*   HCT 34.5* 30.8*    314     CMP:   Recent Labs   Lab 04/22/24  1520 04/23/24  0529   * 129*   K 4.7 4.4   CL 93* 94*   CO2 24 24   * 133*   BUN 19 19   CREATININE 0.8 0.8   CALCIUM 9.4 9.1   PROT 7.1  --    ALBUMIN 3.0*  --    BILITOT 0.3  --    ALKPHOS 85  --    AST 28  --    ALT 31  --    ANIONGAP 14 11     Cardiac Markers:   Recent Labs   Lab 04/22/24  1520   BNP 85       Lactic Acid:   Recent Labs   Lab 04/22/24  1542 04/22/24  1937 04/23/24  0842   LACTATE 1.3 1.0 1.0       Magnesium:   Recent Labs   Lab 04/23/24  0529   MG 1.8     Respiratory Culture:   Recent Labs   Lab 04/23/24  0026   GSRESP >10epis/lfp and <than many WBC's  Predominance of oropharyngeal brian. Please recollect.   RESPIRATORYC Specimen inadequate - culture not performed     Troponin:   Recent Labs   Lab 04/22/24  1520 04/22/24  1807 04/23/24  0529   TROPONINI <0.006 0.010 0.008     TSH:    Recent Labs   Lab 02/12/24  1527   TSH 2.89       KUB reveals significant stool in the right colon  CT scan of abdomen and pelvis pending  Significant Imaging: I have reviewed all pertinent imaging results/findings within the past 24 hours.

## 2024-04-23 NOTE — ASSESSMENT & PLAN NOTE
Patient with Hypoxic Respiratory failure which is Acute on chronic.  he is on home oxygen at 2 LPM. Supplemental oxygen was provided and noted-      .   Signs/symptoms of respiratory failure include- tachypnea, increased work of breathing, use of accessory muscles, and wheezing. Contributing diagnoses includes - COPD, Pleural effusion, Pneumonia, and lung mass  Labs and images were reviewed. Patient Has not had a recent ABG. Will treat underlying causes and adjust management of respiratory failure as follows-     Was given Solu-medrol x one dose per ED, defer additional steroids at this time, no significant wheezing noted on exam  Supplemental oxygen increased to 3 L/min NC at this time

## 2024-04-23 NOTE — PLAN OF CARE
312/312 GISEL Mayfield III is a 83 y.o.male admitted on 4/22/2024 for Chronic respiratory failure with hypoxia   Code Status: Full Code MRN: [id number]   Review of patient's allergies indicates:   Allergen Reactions    Sulfamethoxazole-trimethoprim Itching     Past Medical History:   Diagnosis Date    BPH (benign prostatic hyperplasia)     CHF (congestive heart failure)     Chronic back pain     Chronic hypoxic respiratory failure, on home oxygen therapy     COPD (chronic obstructive pulmonary disease)     Coronary artery disease     Daily consumption of alcohol     Fibroadenoma of breast     History of tobacco abuse     Hypertension     Hypothyroidism, unspecified     Infrarenal abdominal aortic aneurysm (AAA) without rupture     Kidney stones     Neuropathy     NSTEMI (non-ST elevated myocardial infarction)     Personal history of colonic polyps     Shingles (herpes zoster) polyneuropathy       PRN meds    acetaminophen, 650 mg, Q6H PRN  bisacodyL, 10 mg, Daily PRN  cyclobenzaprine, 10 mg, TID PRN  melatonin, 6 mg, Nightly PRN  morphine, 2 mg, Q4H PRN  ondansetron, 4 mg, Q8H PRN  sodium chloride 0.9%, 10 mL, Q12H PRN  traMADoL, 50 mg, Q6H PRN      Chart check completed. Will continue plan of care.               Lead Monitored: Lead II Rhythm: normal sinus rhythm Frequency/Ectopy: PVCs  Cardiac/Telemetry Box Number: [id number]  VTE Required Core Measure: Pharmacological prophylaxis initiated/maintained Last Bowel Movement: 04/18/24  Diet Low Sodium, 2gm  Voiding Characteristics: external catheter  Neo Score: 14  Fall Risk Score: 10  Accucheck []   Freq?      Lines/Drains/Airways       Peripheral Intravenous Line  Duration                  Peripheral IV - Single Lumen 04/23/24 0102 22 G Posterior;Right Forearm <1 day

## 2024-04-23 NOTE — ASSESSMENT & PLAN NOTE
Likely related to pneumonia/pleural effusions and coughing  Initial troponin is normal, we will trend  EKG reviewed, sinus tach with first-degree AV block and right bundle branch block, no concerning ST or T-wave changes  Cardiac monitoring    Questionably related to distended abdomen

## 2024-04-23 NOTE — ASSESSMENT & PLAN NOTE
Patient found to have moderate pleural effusion on imaging. I have personally reviewed and interpreted the following imaging: Xray and CT. A thoracentesis was deferred pending pulmonology consultation.  Most likely etiology includes  possible malignancy with new lung mass discovered on CT scan, and pneumonia    Holding off on diuresis at this time, as he appears dry

## 2024-04-23 NOTE — ASSESSMENT & PLAN NOTE
Patient found to have moderate pleural effusion on imaging. I have personally reviewed and interpreted the following imaging: Xray and CT. A thoracentesis was deferred pending pulmonology consultation.  Most likely etiology includes  possible malignancy with new lung mass discovered on CT scan, and pneumonia    Holding off on diuresis at this time, as he appears dry  Patient given IV fluids Kannan assess in a.m.

## 2024-04-23 NOTE — ASSESSMENT & PLAN NOTE
Patient's COPD is with exacerbation noted by continued dyspnea and worsening of baseline hypoxia currently.  Patient is currently off COPD Pathway. Continue scheduled inhalers Antibiotics and Supplemental oxygen and monitor respiratory status closely.   Was given 1 dose of IV Solu-Medrol with significant improvement, we will hold off on additional steroids at this time, add back PRN  Nebs ordered every 6 hours

## 2024-04-23 NOTE — ASSESSMENT & PLAN NOTE
Mass in the right lower lobe in the roseanne fissural region measures 6.2 by 10 cm worrisome for malignancy  Pulmonology consulted  Awaiting abdominal evaluation

## 2024-04-23 NOTE — ASSESSMENT & PLAN NOTE
Patient with chronic hypoxic respiratory failure on home oxygen of 2L/NC.     CT chest imaging in September 2023 with marked emphysematous changes and some bronchial wall thickening. No evidence of mass / consolidation / nodules on imaging. Trace bilateral effusions noted   Repeat CT chest yesterday reviewed in comparison to prior imaging. Low suspicion for underlying malignancy in light of no evidence of prior mass, lesion, nodule. No mediastinal lymphadenopathy  Obtain sputum culture; follow results  Differential diagnosis: decompensated heart failure, acute infection, malignancy, aspiration  Does not appear to be in acute COPD exacerbation at this time  Check legionella  Check fungitell and fungal immuno studies  May represent aspiration  Continue Rocephin and Azith  Follow cultures and adjust antibiotics as appropriate  May ultimately need bronchoscopy  Prior to bronchoscopy; likely needs antibiotic course with repeat imaging to assess for further work-up/evaluation needs  Follow chest imaging as needed  Follow fever and WBC trends  Continue supplemental oxygen as needed; titrate to  maintain sats 90% or greater  Patient complains of left lower lobe / LUQ abdominal pain with associated abdominal distention and mild LUQ abdominal tenderness. Patient reports chronic constipation and states 2 weeks since last BM  Difficult to determine if pain is related to abdominal discomfort; however, effusions do not appear large enough to cause this degree of pleuritic chest pain  Trial of Toradol x1  Check abdominal imaging to assess for obstruction/ileus  Follow abdominal imaging to assess for thoracentesis needs  Patient with bilateral pleural effusions; however, effusion does not appear large enough for thoracentesis consideration  Close pulmonary follow-up upon discharge

## 2024-04-23 NOTE — PT/OT/SLP PROGRESS
Physical Therapy      Patient Name:  Jason Mayfield III   MRN:  [id number]    RE-ATTEMPTED P.T. DANIELLE THIS AM, PT CURRENTLY WITH PULMONOLOGY, WILL CONTINUE EFFORTS    Alaina Maza, PT  4/23/2024  0843

## 2024-04-23 NOTE — CONSULTS
[facility]  Pulmonology Consult Note    Patient Name: Jason Mayfield III   MRN: [id number]  Admission Date: 4/22/2024   Hospital Length of Stay: 1 days  Code Status: Full Code    Attending Physician: Gisella Aguilar MD    Inpatient consult to Pulmonology  Consult performed by: Deandra Hunt NP  Consult ordered by: Nichole Breen NP        Subjective:   Chief complaint: shortness of breath, chest pain / abdominal pain  History of Present Illness:  Jason Mayfield is a 83-year-old male with a past medical history significant for hypertension, NSTEMI, chronic diastolic heart failure, COPD / emphysema, chronic hypoxic respiratory failure on home oxygen of 2L/NC, BPH, hypothyroidism, AAA, chronic back pain, neuropathy, daily alcohol use, and former tobacco abuse who presented for evaluation of left lower chest pain and shortness of breath. Patient and family endorses a dry non-productive cough which had been ongoing for approximately 5days. Denies fever / chills or diarrhea at home. Endorses constipation and reports no bowel movement for almost two weeks. On exam, some mild LUQ tenderness with palpation; however, no guarding noted. Abdomen firm to touch with faint minimal bowel sounds. Due to his severe pain, the patient reports difficulty with taking deep breaths. Pain exacerbated by cough / deep inspiration. CTA chest completed at time of presentation demonstrates extensive emphysematous changes, bilateral pleural effusions, and RLL consolidation / mass. Patient was admitted by hospital medicine and pulmonary consulted for evaluation.     Of note, patient is followed by Dr. Gant with [facility] pulmonology. 60-pack year smoking history. Previous CT imaging in September 2023 with marked emphysematous changes and some bronchial wall thickening. No evidence of mass / consolidation / nodules on imaging. Trace bilateral effusions noted. Patient [place] a [place] in  [place] with reports of 3 cats and 2 dogs in their home.     Patient recently seen by Dr. Gant in 2023 who felt his shortness of breath was multifactorial. Patient is noted to have moderate impairment on his pulmonary testing and felt his conditions were exacerbated by his underlying diastolic heart failure. In addition, it was felt a significant portion of his SOB was likely contributed to physical deconditioning as he demonstrates activity intolerance with an inability to walk greater than 100ft without worsening dyspnea.    Past Medical History:   Diagnosis Date    BPH (benign prostatic hyperplasia)     CHF (congestive heart failure)     Chronic back pain     Chronic hypoxic respiratory failure, on home oxygen therapy     COPD (chronic obstructive pulmonary disease)     Coronary artery disease     Daily consumption of alcohol     Fibroadenoma of breast     History of tobacco abuse     Hypertension     Hypothyroidism, unspecified     Infrarenal abdominal aortic aneurysm (AAA) without rupture     Kidney stones     Neuropathy     NSTEMI (non-ST elevated myocardial infarction)     Personal history of colonic polyps     Shingles (herpes zoster) polyneuropathy      Past Surgical History:   Procedure Laterality Date    ADENOIDECTOMY      APPENDECTOMY      COLONOSCOPY      LUMBAR DISCECTOMY      REPAIR, RETINAL DETACHMENT      SPINAL CORD STIMULATOR IMPLANT      TONSILLECTOMY       Review of patient's allergies indicates:   Allergen Reactions    Sulfamethoxazole-trimethoprim Itching     Family History       Problem Relation (Age of Onset)    Colon cancer Maternal Grandfather    Coronary artery disease Sister    Emphysema Father    No Known Problems Mother          Tobacco Use    Smoking status: Former     Types: Cigarettes     Start date: 1/15/2024     Quit date: 1964     Years since quittin.0    Smokeless tobacco: Not on file   Substance and Sexual Activity    Alcohol use: Yes     Comment: 2  drinks hard liquor per day    Drug use: Not Currently    Sexual activity: Not on file       Review of Systems   Constitutional:  Positive for activity change. Negative for chills, fever and unexpected weight change.   Respiratory:  Positive for cough and shortness of breath.    Cardiovascular:  Positive for chest pain.   Gastrointestinal:  Positive for abdominal distention, abdominal pain and constipation.     Objective:     Vital Signs (Most Recent):  Temp: 98.4 °F (36.9 °C) (04/23/24 0744)  Pulse: 82 (04/23/24 0744)  Resp: (!) 22 (04/23/24 0818)  BP: 123/62 (04/23/24 0744)  SpO2: (!) 92 % (04/23/24 0818) Vital Signs (24h Range):  Temp:  [97.5 °F (36.4 °C)-98.6 °F (37 °C)] 98.4 °F (36.9 °C)  Pulse:  [] 82  Resp:  [18-25] 22  SpO2:  [84 %-98 %] 92 %  BP: ()/(51-68) 123/62   Weight: 80.6 kg (177 lb 11.1 oz); Body mass index is 24.78 kg/m².    Intake/Output Summary (Last 24 hours) at 4/23/2024 1151  Last data filed at 4/22/2024 1848  Gross per 24 hour   Intake 100 ml   Output --   Net 100 ml      Physical Exam  Vitals and nursing note reviewed.   Constitutional:       Appearance: He is ill-appearing.   HENT:      Head: Normocephalic.   Eyes:      Conjunctiva/sclera: Conjunctivae normal.   Cardiovascular:      Rate and Rhythm: Normal rate.   Pulmonary:      Effort: Pulmonary effort is normal.   Abdominal:      General: There is distension.      Tenderness: There is abdominal tenderness.   Musculoskeletal:         General: Normal range of motion.      Cervical back: Normal range of motion.   Skin:     General: Skin is warm and dry.   Neurological:      Mental Status: He is alert and oriented to person, place, and time.        Significant Labs:  CBC/Anemia Profile:  Recent Labs   Lab 04/22/24  1520 04/23/24  0529   WBC 17.33* 15.38*   HGB 11.5* 10.4*   HCT 34.5* 30.8*    314   MCV 94 93   RDW 12.7 12.6   IRON  --  15*   FERRITIN  --  1,205*   RETIC  --  0.9   FOLATE  --  5.6   [id number]  --  1164*    Chemistries:  Recent Labs   Lab 04/22/24  1520 04/23/24  0529   * 129*   K 4.7 4.4   CL 93* 94*   CO2 24 24   BUN 19 19   CREATININE 0.8 0.8   CALCIUM 9.4 9.1   ALBUMIN 3.0*  --    PROT 7.1  --    BILITOT 0.3  --    ALKPHOS 85  --    ALT 31  --    AST 28  --    MG  --  1.8     Significant Imaging:   CT: I have reviewed all pertinent results/findings within the past 24 hours and my personal findings are:  large RLL consolidation / mass; bilateral effusions left greater than right    Assessment/Plan:   Chronic respiratory failure with hypoxia  Right lower lobe lung mass  Bilateral pleural effusion  COPD exacerbation  Pleuritic chest pain  Patient with chronic hypoxic respiratory failure on home oxygen of 2L/NC.     CT chest imaging in September 2023 with marked emphysematous changes and some bronchial wall thickening. No evidence of mass / consolidation / nodules on imaging. Trace bilateral effusions noted   Repeat CT chest yesterday reviewed in comparison to prior imaging. Low suspicion for underlying malignancy in light of no evidence of prior mass, lesion, nodule. No mediastinal lymphadenopathy  Obtain sputum culture; follow results  Differential diagnosis: decompensated heart failure, acute infection, malignancy, aspiration  Does not appear to be in acute COPD exacerbation at this time  Check legionella  Check fungitell and fungal immuno studies  May represent aspiration  Continue Rocephin and Azith  Follow cultures and adjust antibiotics as appropriate  May ultimately need bronchoscopy; however, prior to bronchoscopy consideration; likely needs antibiotic course with repeat imaging to assess for further work-up/evaluation needs  Follow chest imaging as needed  Follow fever and WBC trends  Continue supplemental oxygen as needed; titrate to  maintain sats 90% or greater  Patient complains of left lower lobe / LUQ abdominal pain with associated abdominal distention and mild LUQ abdominal tenderness. Patient  reports chronic constipation and states 2 weeks since last BM  Difficult to determine if pain is related to abdominal discomfort; however, effusions do not appear large enough to cause this degree of pleuritic chest pain  Trial of Toradol x1  Check abdominal imaging to assess for obstruction/ileus  Follow abdominal imaging to assess for thoracentesis needs  Patient with bilateral pleural effusions; however, effusion does not appear large enough for thoracentesis consideration  Close pulmonary follow-up upon discharge    Chronic diastolic congestive heart failure  Patient with chronic diastolic heart failure.     Last echo 9/27/2023: FINDINGS: The left ventricle is not well visualized. Normal left ventricular cavity size. Low normal left ventricular systolic function. LVEF 50 - 55%. Mild concentric hypertrophy. The right ventricle is not well visualized. Normal right ventricular size. Normal right ventricular systolic function. The left atrium is normal in size. Mild to moderately dilated right atrium. The mitral valve is not well visualized. No or trivial mitral valve regurgitation. No mitral valve stenosis. The aortic valve appears to have a tricuspid configuration (suboptimal   visualization). The aortic valve is not well visualized. No or trivial aortic valve regurgitation. No aortic valve stenosis. Mild tricuspid valve regurgitation. No tricuspid valve stenosis. The estimated right ventricular systolic pressure/PASP is 35-40 mmHg. The pulmonic valve is not well visualized. No or trivial pulmonic valve regurgitation. No pulmonic valve stenosis.     Monitor for diuretic needs  Monitor I&O trends  Monitor hemodynamics closely      Thank you for your consult. I will follow-up with patient. Please contact us if you have any additional questions.     Deandra Hunt NP  Pulmonary and Critical Care  [facility]

## 2024-04-23 NOTE — SUBJECTIVE & OBJECTIVE
Past Medical History:   Diagnosis Date    BPH (benign prostatic hyperplasia)     CHF (congestive heart failure)     Chronic back pain     Chronic hypoxic respiratory failure, on home oxygen therapy     COPD (chronic obstructive pulmonary disease)     Coronary artery disease     Daily consumption of alcohol     History of tobacco abuse     Hypertension     Hypothyroidism, unspecified     Infrarenal abdominal aortic aneurysm (AAA) without rupture     Kidney stones     Neuropathy     NSTEMI (non-ST elevated myocardial infarction)     Personal history of colonic polyps     Shingles (herpes zoster) polyneuropathy        Past Surgical History:   Procedure Laterality Date    ADENOIDECTOMY      APPENDECTOMY      COLONOSCOPY      LUMBAR DISCECTOMY      REPAIR, RETINAL DETACHMENT      SPINAL CORD STIMULATOR IMPLANT      TONSILLECTOMY         Review of patient's allergies indicates:   Allergen Reactions    Sulfamethoxazole-trimethoprim Itching       Current Facility-Administered Medications   Medication Dose Route Frequency Provider Last Rate Last Admin    0.9%  NaCl infusion   Intravenous Continuous Nichole Breen NP        acetaminophen tablet 650 mg  650 mg Oral Q6H PRN Nichole Breen NP        albuterol-ipratropium 2.5 mg-0.5 mg/3 mL nebulizer solution 3 mL  3 mL Nebulization Q6H Nichole Breen NP        arformoteroL nebulizer solution 15 mcg  15 mcg Nebulization BID Haleigh Burch MD        atorvastatin tablet 40 mg  40 mg Oral QHS Nichole Breen NP   40 mg at 04/23/24 0048    azithromycin (ZITHROMAX) 500 mg in dextrose 5 % (D5W) 250 mL IVPB (Vial-Mate)  500 mg Intravenous Q24H Nichole Breen NP        bisacodyL suppository 10 mg  10 mg Rectal Daily PRN Nichole Breen NP        budesonide nebulizer solution 0.5 mg  0.5 mg Nebulization Q12H Haleigh Bruch MD        cefTRIAXone (ROCEPHIN) 2 g in dextrose 5 % in water (D5W) 100 mL IVPB (MB+)  2 g Intravenous Q24H Nichole Breen NP        cyclobenzaprine  tablet 10 mg  10 mg Oral TID PRN Nichole Breen NP        finasteride tablet 5 mg  5 mg Oral Daily Nichole Breen NP        gabapentin capsule 1,200 mg  1,200 mg Oral TID Nichole Breen NP        hydrALAZINE tablet 50 mg  50 mg Oral BID Nichole Breen NP   50 mg at 24 0048    ipratropium 0.02 % nebulizer solution 0.5 mg  0.5 mg Nebulization Q6H Haleigh Burch MD        melatonin tablet 6 mg  6 mg Oral Nightly PRN Nichole Breen NP   6 mg at 24 0048    morphine injection 2 mg  2 mg Intravenous Q4H PRN Haleigh Burch MD        ondansetron injection 4 mg  4 mg Intravenous Q8H PRN Nichole Breen NP        polyethylene glycol packet 17 g  17 g Oral Daily Nichole Breen NP        sodium chloride 0.9% flush 10 mL  10 mL Intravenous Q12H PRN Nichole Breen NP        traMADoL tablet 50 mg  50 mg Oral Q6H PRN Haleigh Burch MD   50 mg at 24 0048     Family History       Problem Relation (Age of Onset)    Colon cancer Maternal Grandfather    Coronary artery disease Sister    Emphysema Father    No Known Problems Mother          Tobacco Use    Smoking status: Former     Types: Cigarettes     Start date: 1/15/2024     Quit date: 1964     Years since quittin.0    Smokeless tobacco: Not on file   Substance and Sexual Activity    Alcohol use: Yes     Comment: 2 drinks hard liquor per day    Drug use: Not Currently    Sexual activity: Not on file     Review of Systems   Constitutional:  Positive for activity change and fatigue. Negative for chills, diaphoresis and fever.   HENT:  Positive for postnasal drip. Negative for sore throat and trouble swallowing.    Eyes: Negative.    Respiratory:  Positive for cough, chest tightness, shortness of breath and wheezing.    Cardiovascular:  Positive for chest pain. Negative for palpitations and leg swelling.   Gastrointestinal:  Positive for constipation and nausea. Negative for abdominal distention, abdominal pain and vomiting.   Endocrine:  Negative.    Genitourinary: Negative.    Musculoskeletal:  Positive for back pain and gait problem.   Skin: Negative.    Neurological:  Positive for weakness. Negative for dizziness, syncope, light-headedness and headaches.   Psychiatric/Behavioral: Negative.       Objective:     Vital Signs (Most Recent):  Temp: 97.5 °F (36.4 °C) (04/23/24 0003)  Pulse: 90 (04/23/24 0003)  Resp: (!) 22 (04/23/24 0048)  BP: (!) 174/63 (04/23/24 0003)  SpO2: 96 % (04/23/24 0003) Vital Signs (24h Range):  Temp:  [97.5 °F (36.4 °C)-98.6 °F (37 °C)] 97.5 °F (36.4 °C)  Pulse:  [] 90  Resp:  [18-25] 22  SpO2:  [91 %-98 %] 96 %  BP: (111-174)/(54-68) 174/63     Weight: 77.1 kg (170 lb)  Body mass index is 23.71 kg/m².     Physical Exam  Vitals reviewed.   Constitutional:       General: He is not in acute distress.     Appearance: He is ill-appearing. He is not toxic-appearing or diaphoretic.   HENT:      Head: Normocephalic.      Nose: Nose normal.      Mouth/Throat:      Mouth: Mucous membranes are moist.      Pharynx: Oropharynx is clear.   Eyes:      Extraocular Movements: Extraocular movements intact.      Pupils: Pupils are equal, round, and reactive to light.   Cardiovascular:      Rate and Rhythm: Normal rate and regular rhythm.      Pulses: Normal pulses.      Heart sounds: Normal heart sounds.   Pulmonary:      Effort: Pulmonary effort is normal. No respiratory distress.      Breath sounds: Wheezing present. No rales.      Comments: Diminished bilaterally  Chest:      Chest wall: Tenderness present.   Abdominal:      General: Bowel sounds are normal. There is no distension.      Palpations: Abdomen is soft.      Tenderness: There is no abdominal tenderness. There is no guarding.      Comments: obese   Musculoskeletal:         General: Normal range of motion.      Cervical back: Neck supple.      Right lower leg: No edema.      Left lower leg: No edema.   Skin:     General: Skin is warm and dry.      Capillary Refill:  Capillary refill takes less than 2 seconds.      Coloration: Skin is pale.   Neurological:      General: No focal deficit present.      Mental Status: He is alert and oriented to person, place, and time.      Motor: Weakness present.   Psychiatric:         Mood and Affect: Mood normal.         Behavior: Behavior normal.              CRANIAL NERVES     CN III, IV, VI   Pupils are equal, round, and reactive to light.       Significant Labs: All pertinent labs within the past 24 hours have been reviewed.  Recent Lab Results  (Last 5 results in the past 24 hours)        04/22/24  1937   04/22/24  1807   04/22/24  1601   04/22/24  1542   04/22/24  1520        Procalcitonin         0.09  Comment: A concentration < 0.25 ng/mL represents a low risk of bacterial   infection.  Procalcitonin may not be accurate among patients with localized   infection, recent trauma or major surgery, immunosuppressed state,   invasive fungal infection, renal dysfunction. Decisions regarding   initiation or continuation of antibiotic therapy should not be based   solely on procalcitonin levels.         Albumin         3.0       ALP         85       ALT         31       Anion Gap         14       Appearance, UA     Clear           AST         28       Baso #         0.06       Basophil %         0.3       Bilirubin (UA)     Negative           BILIRUBIN TOTAL         0.3  Comment: For infants and newborns, interpretation of results should be based  on gestational age, weight and in agreement with clinical  observations.    Premature Infant recommended reference ranges:  Up to 24 hours.............<8.0 mg/dL  Up to 48 hours............<12.0 mg/dL  3-5 days..................<15.0 mg/dL  6-29 days.................<15.0 mg/dL         BNP         85  Comment: Values of less than 100 pg/ml are consistent with non-CHF populations.       BUN         19       Calcium         9.4       Chloride         93       CO2         24       Color, UA     Yellow            Creatinine         0.8       Differential Method         Automated       eGFR         >60.0       Eos #         0.1       Eos %         0.5       Glucose         129       Glucose, UA     Negative           Gran # (ANC)         14.6       Gran %         83.9       Hematocrit         34.5       Hemoglobin         11.5       Immature Grans (Abs)         0.12  Comment: Mild elevation in immature granulocytes is non specific and   can be seen in a variety of conditions including stress response,   acute inflammation, trauma and pregnancy. Correlation with other   laboratory and clinical findings is essential.         Immature Granulocytes         0.7       Ketones, UA     Negative           Lactic Acid Level 1.0  Comment: Falsely low lactic acid results can be found in samples   containing >=13.0 mg/dL total bilirubin and/or >=3.5 mg/dL   direct bilirubin.         1.3  Comment: Falsely low lactic acid results can be found in samples   containing >=13.0 mg/dL total bilirubin and/or >=3.5 mg/dL   direct bilirubin.           Leukocyte Esterase, UA     Negative           Lymph #         0.9       Lymph %         5.0       MCH         31.2       MCHC         33.3       MCV         94       Mono #         1.7       Mono %         9.6       MPV         9.8       NITRITE UA     Negative           nRBC         0       Blood, UA     Negative           QRS Duration               [facility] QTC Calculation               pH, UA     6.0           Platelet Count         328       Potassium         4.7       PROTEIN TOTAL         7.1       Protein, UA     Trace  Comment: Recommend a 24 hour urine protein or a urine   protein/creatinine ratio if globulin induced proteinuria is  clinically suspected.             RBC         3.69       RDW         12.7       Sodium         131       Specific [place], UA     1.015           Specimen UA     Urine, Clean Catch           Troponin I   0.010  Comment: The reference interval for Troponin I  represents the 99th percentile   cutoff   for our facility and is consistent with 3rd generation assay   performance.         <0.006  Comment: The reference interval for Troponin I represents the 99th percentile   cutoff   for our facility and is consistent with 3rd generation assay   performance.         UROBILINOGEN UA     Negative           WBC         17.33                            EKG reviewed, ST, first degree AVB and right BBB    Significant Imaging: I have reviewed all pertinent imaging results/findings within the past 24 hours.    CTA chest:  FINDINGS:  Mass in the right lower lobe in the Roseanne fissural region measures 6.2 by 10 cm worrisome for malignancy.  Extensive emphysema.  Moderate left-sided pleural effusion.  Spinal stimulator.  Moderate left-sided pleural effusion.  Mild left basilar atelectasis/consolidation.  Moderate right basilar atelectasis/consolidation and mild right basilar pleural effusion.  No pulmonary emboli.  No aortic dissection.  Atherosclerotic changes.     Impression:     Extensive emphysema.  Moderate left-sided pleural effusion mild right-sided pleural effusion.  Moderate right basilar atelectasis/consolidation mild left basilar atelectasis/consolidation.  Suggestion of mass in the right lower lobe roseanne-fissural region.  Findings worrisome for malignancy.  Recommend clinical correlation and follow-up.

## 2024-04-23 NOTE — ASSESSMENT & PLAN NOTE
Patient is identified as having Diastolic (HFpEF) heart failure that is Chronic. CHF is currently controlled. Latest ECHO performed and demonstrates- No results found for this or any previous visit.  Monitor clinical status closely. Monitor on telemetry. Patient is off CHF pathway.  Monitor strict Is&Os and daily weights.  Fluid restriction not indicated at this time. Cardiology has not been consulted. Continue to stress to patient importance of self efficacy and  on diet for CHF. Last BNP reviewed- and noted below   Recent Labs   Lab 04/22/24  1520   BNP 83       Holding home Lasix 20 mg daily at this time as patient appears dry on exam

## 2024-04-23 NOTE — H&P
[facility] [address] [place] [address] Medicine  History & Physical    Patient Name: Jason Mayfield III  MRN: [id number]  Patient Class: IP- Inpatient  Admission Date: 4/22/2024  Attending Physician: Haleigh Burch MD   Primary Care Provider: TANNER Mane MD         Patient information was obtained from patient, spouse/SO, past medical records, and ER records.     Subjective:     Principal Problem:Acute on chronic hypoxic respiratory failure    Chief Complaint:   Chief Complaint   Patient presents with    Chest Pain     Reports intermittent chest pain for several weeks. Pt reports that around 8am today, his chest pain came back and is worse than the pain he has been experiencing        HPI: Patient is 83-year-old male with past medical history significant for hypertension, NSTEMI, diastolic congestive heart failure, COPD, chronic hypoxic respiratory failure on home O2 @ 2L/min NC, BPH,  fibroadenoma of breast, hypothyroidism, infrarenal AAA(3.4 cm),  chronic back pain, neuropathy, shingles infection, kidney stones, colonic polyps, daily alcohol use, and former tobacco abuse who presented to ED with complaints of chest pain and dyspnea.  He reports  that for the past 2-3 days he has been having left upper chest pain, moderate, worsened with deep breathing, chronic cough, with no radiation into neck, jaw, or arms.  He also reports some generalized weakness and trouble walking over the past 2-3 days as well.  He reports some wheezing although states that this is his baseline. He denies fever, chills , abdominal pain, nausea, vomiting, diarrhea, leg pain, dysuria, or worsening edema. he states that he is normally on 2 L per minute nasal cannula, however over the past couple of days he has turned it up to 3-4 liters/minute.  He is followed by Dr. Gant.  Vital signs on arrival to ED-[facility]  with 98.3 temp, heart rate 107, respiratory rate 25, blood pressure 135/62, 91% SpO2 on 4 liters/minute nasal cannula.  He has  remained afebrile while in ED. oxygen has been weaned to 3 liters/minute nasal cannula with SpO2 96%.  Lab workup reveals WBC 17.33, hemoglobin 11.5, hematocrit 34.5, sodium 131, potassium 4.7, chloride 93, CO2 24, BUN 19, creatinine 0.8, glucose 129, normal LFTs, BNP 85, troponin 0.010, lactic acid 1.0, procalcitonin 0.09,  and normal urinalysis.  EKG:  Sinus tach with first-degree AV block and right bundle-branch block, heart rate 102.  Chest x-ray notes thoracic spinal cord stimulator leads, normal heart size, mild aortic atherosclerosis, mild volume loss with vascular crowding in both lung bases.  CTA of chest shows extensive emphysema, moderate left-sided pleural effusion, mild right-sided pleural effusion, moderate right basilar atelectasis versus consolidation and mild left basilar atelectasis versus consolidation, suggestion of mass in the right lower lobe perifissural region measuring 6.2 x 10 cm, worrisome for malignancy.  He was given Rocephin, DuoNeb, Solu-Medrol, gabapentin, and tramadol while in ED. Hospital Medicine was consulted for admission due to worsening hypoxic respiratory failure, pneumonia, and newly discovered lung mass.    Past Medical History:   Diagnosis Date    BPH (benign prostatic hyperplasia)     CHF (congestive heart failure)     Chronic back pain     Chronic hypoxic respiratory failure, on home oxygen therapy     COPD (chronic obstructive pulmonary disease)     Coronary artery disease     Daily consumption of alcohol     History of tobacco abuse     Hypertension     Hypothyroidism, unspecified     Infrarenal abdominal aortic aneurysm (AAA) without rupture     Kidney stones     Neuropathy     NSTEMI (non-ST elevated myocardial infarction)     Personal history of colonic polyps     Shingles (herpes zoster) polyneuropathy        Past Surgical History:   Procedure Laterality Date    ADENOIDECTOMY      APPENDECTOMY      COLONOSCOPY      LUMBAR DISCECTOMY      REPAIR, RETINAL DETACHMENT       SPINAL CORD STIMULATOR IMPLANT      TONSILLECTOMY         Review of patient's allergies indicates:   Allergen Reactions    Sulfamethoxazole-trimethoprim Itching       Current Facility-Administered Medications   Medication Dose Route Frequency Provider Last Rate Last Admin    0.9%  NaCl infusion   Intravenous Continuous Nichole Breen NP        acetaminophen tablet 650 mg  650 mg Oral Q6H PRN Nichole Breen NP        albuterol-ipratropium 2.5 mg-0.5 mg/3 mL nebulizer solution 3 mL  3 mL Nebulization Q6H Nichole Breen NP        arformoteroL nebulizer solution 15 mcg  15 mcg Nebulization BID Haleigh Burch MD        atorvastatin tablet 40 mg  40 mg Oral QHS Nichole Breen NP   40 mg at 04/23/24 0048    azithromycin (ZITHROMAX) 500 mg in dextrose 5 % (D5W) 250 mL IVPB (Vial-Mate)  500 mg Intravenous Q24H Nichole Breen NP        bisacodyL suppository 10 mg  10 mg Rectal Daily PRN Nichole Breen NP        budesonide nebulizer solution 0.5 mg  0.5 mg Nebulization Q12H Haleigh Burch MD        cefTRIAXone (ROCEPHIN) 2 g in dextrose 5 % in water (D5W) 100 mL IVPB (MB+)  2 g Intravenous Q24H Nichole Breen NP        cyclobenzaprine tablet 10 mg  10 mg Oral TID PRN Nichole Breen NP        finasteride tablet 5 mg  5 mg Oral Daily Nichole Breen NP        gabapentin capsule 1,200 mg  1,200 mg Oral TID Nichole Breen NP        hydrALAZINE tablet 50 mg  50 mg Oral BID Nichole Breen NP   50 mg at 04/23/24 0048    ipratropium 0.02 % nebulizer solution 0.5 mg  0.5 mg Nebulization Q6H Haleigh Burch MD        melatonin tablet 6 mg  6 mg Oral Nightly PRN Nichole Breen NP   6 mg at 04/23/24 0048    morphine injection 2 mg  2 mg Intravenous Q4H PRN Haleigh Burch MD        ondansetron injection 4 mg  4 mg Intravenous Q8H PRN Nichole Breen NP        polyethylene glycol packet 17 g  17 g Oral Daily Nichole Breen NP        sodium chloride 0.9% flush 10 mL  10 mL Intravenous Q12H PRN Jamshid,  NIKKO Varela        traMADoL tablet 50 mg  50 mg Oral Q6H PRN Haleigh Burch MD   50 mg at 24     Family History       Problem Relation (Age of Onset)    Colon cancer Maternal Grandfather    Coronary artery disease Sister    Emphysema Father    No Known Problems Mother          Tobacco Use    Smoking status: Former     Types: Cigarettes     Start date: 1/15/2024     Quit date: 1964     Years since quittin.0    Smokeless tobacco: Not on file   Substance and Sexual Activity    Alcohol use: Yes     Comment: 2 drinks hard liquor per day    Drug use: Not Currently    Sexual activity: Not on file     Review of Systems   Constitutional:  Positive for activity change and fatigue. Negative for chills, diaphoresis and fever.   HENT:  Positive for postnasal drip. Negative for sore throat and trouble swallowing.    Eyes: Negative.    Respiratory:  Positive for cough, chest tightness, shortness of breath and wheezing.    Cardiovascular:  Positive for chest pain. Negative for palpitations and leg swelling.   Gastrointestinal:  Positive for constipation and nausea. Negative for abdominal distention, abdominal pain and vomiting.   Endocrine: Negative.    Genitourinary: Negative.    Musculoskeletal:  Positive for back pain and gait problem.   Skin: Negative.    Neurological:  Positive for weakness. Negative for dizziness, syncope, light-headedness and headaches.   Psychiatric/Behavioral: Negative.       Objective:     Vital Signs (Most Recent):  Temp: 97.5 °F (36.4 °C) (24)  Pulse: 90 (24)  Resp: (!) 22 (248)  BP: (!) 174/63 (24)  SpO2: 96 % (24) Vital Signs (24h Range):  Temp:  [97.5 °F (36.4 °C)-98.6 °F (37 °C)] 97.5 °F (36.4 °C)  Pulse:  [] 90  Resp:  [18-25] 22  SpO2:  [91 %-98 %] 96 %  BP: (111-174)/(54-68) 174/63     Weight: 77.1 kg (170 lb)  Body mass index is 23.71 kg/m².     Physical Exam  Vitals reviewed.   Constitutional:       General: He is  not in acute distress.     Appearance: He is ill-appearing. He is not toxic-appearing or diaphoretic.   HENT:      Head: Normocephalic.      Nose: Nose normal.      Mouth/Throat:      Mouth: Mucous membranes are moist.      Pharynx: Oropharynx is clear.   Eyes:      Extraocular Movements: Extraocular movements intact.      Pupils: Pupils are equal, round, and reactive to light.   Cardiovascular:      Rate and Rhythm: Normal rate and regular rhythm.      Pulses: Normal pulses.      Heart sounds: Normal heart sounds.   Pulmonary:      Effort: Pulmonary effort is normal. No respiratory distress.      Breath sounds: Wheezing present. No rales.      Comments: Diminished bilaterally  Chest:      Chest wall: Tenderness present.   Abdominal:      General: Bowel sounds are normal. There is no distension.      Palpations: Abdomen is soft.      Tenderness: There is no abdominal tenderness. There is no guarding.      Comments: obese   Musculoskeletal:         General: Normal range of motion.      Cervical back: Neck supple.      Right lower leg: No edema.      Left lower leg: No edema.   Skin:     General: Skin is warm and dry.      Capillary Refill: Capillary refill takes less than 2 seconds.      Coloration: Skin is pale.   Neurological:      General: No focal deficit present.      Mental Status: He is alert and oriented to person, place, and time.      Motor: Weakness present.   Psychiatric:         Mood and Affect: Mood normal.         Behavior: Behavior normal.              CRANIAL NERVES     CN III, IV, VI   Pupils are equal, round, and reactive to light.       Significant Labs: All pertinent labs within the past 24 hours have been reviewed.  Recent Lab Results  (Last 5 results in the past 24 hours)        04/22/24  1937   04/22/24  1807   04/22/24  1601   04/22/24  1542   04/22/24  1520        Procalcitonin         0.09  Comment: A concentration < 0.25 ng/mL represents a low risk of bacterial   infection.  Procalcitonin  may not be accurate among patients with localized   infection, recent trauma or major surgery, immunosuppressed state,   invasive fungal infection, renal dysfunction. Decisions regarding   initiation or continuation of antibiotic therapy should not be based   solely on procalcitonin levels.         Albumin         3.0       ALP         85       ALT         31       Anion Gap         14       Appearance, UA     Clear           AST         28       Baso #         0.06       Basophil %         0.3       Bilirubin (UA)     Negative           BILIRUBIN TOTAL         0.3  Comment: For infants and newborns, interpretation of results should be based  on gestational age, weight and in agreement with clinical  observations.    Premature Infant recommended reference ranges:  Up to 24 hours.............<8.0 mg/dL  Up to 48 hours............<12.0 mg/dL  3-5 days..................<15.0 mg/dL  6-29 days.................<15.0 mg/dL         BNP         85  Comment: Values of less than 100 pg/ml are consistent with non-CHF populations.       BUN         19       Calcium         9.4       Chloride         93       CO2         24       Color, UA     Yellow           Creatinine         0.8       Differential Method         Automated       eGFR         >60.0       Eos #         0.1       Eos %         0.5       Glucose         129       Glucose, UA     Negative           Gran # (ANC)         14.6       Gran %         83.9       Hematocrit         34.5       Hemoglobin         11.5       Immature Grans (Abs)         0.12  Comment: Mild elevation in immature granulocytes is non specific and   can be seen in a variety of conditions including stress response,   acute inflammation, trauma and pregnancy. Correlation with other   laboratory and clinical findings is essential.         Immature Granulocytes         0.7       Ketones, UA     Negative           Lactic Acid Level 1.0  Comment: Falsely low lactic acid results can be found in samples    containing >=13.0 mg/dL total bilirubin and/or >=3.5 mg/dL   direct bilirubin.         1.3  Comment: Falsely low lactic acid results can be found in samples   containing >=13.0 mg/dL total bilirubin and/or >=3.5 mg/dL   direct bilirubin.           Leukocyte Esterase, UA     Negative           Lymph #         0.9       Lymph %         5.0       MCH         31.2       MCHC         33.3       MCV         94       Mono #         1.7       Mono %         9.6       MPV         9.8       NITRITE UA     Negative           nRBC         0       Blood, UA     Negative           QRS Duration               OHS QTC Calculation               pH, UA     6.0           Platelet Count         328       Potassium         4.7       PROTEIN TOTAL         7.1       Protein, UA     Trace  Comment: Recommend a 24 hour urine protein or a urine   protein/creatinine ratio if globulin induced proteinuria is  clinically suspected.             RBC         3.69       RDW         12.7       Sodium         131       Specific [place], UA     1.015           Specimen UA     Urine, Clean Catch           Troponin I   0.010  Comment: The reference interval for Troponin I represents the 99th percentile   cutoff   for our facility and is consistent with 3rd generation assay   performance.         <0.006  Comment: The reference interval for Troponin I represents the 99th percentile   cutoff   for our facility and is consistent with 3rd generation assay   performance.         UROBILINOGEN UA     Negative           WBC         17.33                            EKG reviewed, ST, first degree AVB and right BBB    Significant Imaging: I have reviewed all pertinent imaging results/findings within the past 24 hours.    CTA chest:  FINDINGS:  Mass in the right lower lobe in the Parul fissural region measures 6.2 by 10 cm worrisome for malignancy.  Extensive emphysema.  Moderate left-sided pleural effusion.  Spinal stimulator.  Moderate left-sided pleural effusion.   Mild left basilar atelectasis/consolidation.  Moderate right basilar atelectasis/consolidation and mild right basilar pleural effusion.  No pulmonary emboli.  No aortic dissection.  Atherosclerotic changes.     Impression:     Extensive emphysema.  Moderate left-sided pleural effusion mild right-sided pleural effusion.  Moderate right basilar atelectasis/consolidation mild left basilar atelectasis/consolidation.  Suggestion of mass in the right lower lobe roseanne-fissural region.  Findings worrisome for malignancy.  Recommend clinical correlation and follow-up.     Assessment/Plan:     * Acute on chronic hypoxic respiratory failure  Patient with Hypoxic Respiratory failure which is Acute on chronic.  he is on home oxygen at 2 LPM. Supplemental oxygen was provided and noted-      .   Signs/symptoms of respiratory failure include- tachypnea, increased work of breathing, use of accessory muscles, and wheezing. Contributing diagnoses includes - COPD, Pleural effusion, Pneumonia, and lung mass  Labs and images were reviewed. Patient Has not had a recent ABG. Will treat underlying causes and adjust management of respiratory failure as follows-     Was given Solu-medrol x one dose per ED, defer additional steroids at this time, no significant wheezing noted on exam  Supplemental oxygen increased to 3 L/min NC at this time    Bilateral pleural effusion  Patient found to have moderate pleural effusion on imaging. I have personally reviewed and interpreted the following imaging: Xray and CT. A thoracentesis was deferred pending pulmonology consultation.  Most likely etiology includes  possible malignancy with new lung mass discovered on CT scan, and pneumonia    Holding off on diuresis at this time, as he appears dry      Pneumonia  We will treat for pneumonia with Rocephin and Zithromax at this time  Follow-up blood cultures  Sputum culture ordered, patient does have productive sounding cough however difficult to bring up  secretions  Monitor chest x-ray      Right lower lobe lung mass  Mass in the right lower lobe in the roseanne fissural region measures 6.2 by 10 cm worrisome for malignancy  Pulmonology consulted      Anemia  Patient's anemia is currently controlled. Has not received any PRBCs to date. Etiology likely d/t  unknown, work up in progress  Current CBC reviewed-   Lab Results   Component Value Date    HGB 11.5 (L) 04/22/2024    HCT 34.5 (L) 04/22/2024     Monitor serial CBC and transfuse if patient becomes hemodynamically unstable, symptomatic or H/H drops below 7/21.    This is new finding, had normal H&H 6 months ago  Iron studies ordered  Check stool for occult blood      Hyponatremia  Patient has hyponatremia which is controlled,We will aim to correct the sodium by 4-6mEq in 24 hours. We will monitor sodium Daily. The hyponatremia is due to Dehydration/hypovolemia. We will treat the hyponatremia with IV fluids as follows: NS at 75 ml/hr. The patient's sodium results have been reviewed and are listed below.  Recent Labs   Lab 04/22/24  1520   *       BPH (benign prostatic hyperplasia)  Continue finasteride      Chronic diastolic congestive heart failure  Patient is identified as having Diastolic (HFpEF) heart failure that is Chronic. CHF is currently controlled. Latest ECHO performed and demonstrates- No results found for this or any previous visit.  Monitor clinical status closely. Monitor on telemetry. Patient is off CHF pathway.  Monitor strict Is&Os and daily weights.  Fluid restriction not indicated at this time. Cardiology has not been consulted. Continue to stress to patient importance of self efficacy and  on diet for CHF. Last BNP reviewed- and noted below   Recent Labs   Lab 04/22/24  1520   BNP 85     Holding home Lasix 20 mg daily at this time as patient appears dry on exam    COPD exacerbation  Patient's COPD is with exacerbation noted by continued dyspnea and worsening of baseline hypoxia  currently.  Patient is currently off COPD Pathway. Continue scheduled inhalers Antibiotics and Supplemental oxygen and monitor respiratory status closely.   Was given 1 dose of IV Solu-Medrol with significant improvement, we will hold off on additional steroids at this time, add back PRN  Nebs ordered every 6 hours    Primary hypertension  Chronic, controlled. Latest blood pressure and vitals reviewed-     Temp:  [97.5 °F (36.4 °C)-98.6 °F (37 °C)]   Pulse:  []   Resp:  [18-25]   BP: (111-174)/(54-68)   SpO2:  [91 %-98 %] .   Home meds for hypertension were reviewed and noted below.   Hypertension Medications               furosemide (LASIX) 20 MG tablet Take 20 mg by mouth.    hydrALAZINE (APRESOLINE) 50 MG tablet Take 50 mg by mouth 2 (two) times daily.            While in the hospital, will manage blood pressure as follows; Continue home antihypertensive regimen    Will utilize p.r.n. blood pressure medication only if patient's blood pressure greater than 180/110 and he develops symptoms such as worsening chest pain or shortness of breath.    Chest pain on breathing  Likely related to pneumonia/pleural effusions and coughing  Initial troponin is normal, we will trend  EKG reviewed, sinus tach with first-degree AV block and right bundle branch block, no concerning ST or T-wave changes  Cardiac monitoring        VTE Risk Mitigation (From admission, onward)           Ordered     Reason for No Pharmacological VTE Prophylaxis  Once        Comments: Possible procedure   Question:  Reasons:  Answer:  Physician Provided (leave comment)    04/23/24 0030     IP VTE HIGH RISK PATIENT  Once         04/23/24 0030     Place sequential compression device  Until discontinued         04/23/24 0030                  Full Resuscitation     Surrogate decision maker wife, [place] and son who is a pulmonologist in [facility]    Case discussed with Dr. Haleigh Breen, NP  Department of Hospital  Medicine  [facility]

## 2024-04-23 NOTE — ASSESSMENT & PLAN NOTE
Mass in the right lower lobe in the roseanne fissural region measures 6.2 by 10 cm worrisome for malignancy  Pulmonology consulted

## 2024-04-23 NOTE — ASSESSMENT & PLAN NOTE
Patient has hyponatremia which is controlled,We will aim to correct the sodium by 4-6mEq in 24 hours. We will monitor sodium Daily. The hyponatremia is due to Dehydration/hypovolemia. We will treat the hyponatremia with IV fluids as follows: NS at 75 ml/hr. The patient's sodium results have been reviewed and are listed below.  Recent Labs   Lab 04/22/24  1520   *

## 2024-04-23 NOTE — ASSESSMENT & PLAN NOTE
Patient has hyponatremia which is controlled,We will aim to correct the sodium by 4-6mEq in 24 hours. We will monitor sodium Daily. The hyponatremia is due to Dehydration/hypovolemia. We will treat the hyponatremia with IV fluids as follows: NS at 75 ml/hr. The patient's sodium results have been reviewed and are listed below.  Recent Labs   Lab 04/23/24  0529   *     We will continue to trend

## 2024-04-23 NOTE — ASSESSMENT & PLAN NOTE
Patient with chronic diastolic heart failure.     Last echo 9/27/2023: FINDINGS: The left ventricle is not well visualized. Normal left ventricular cavity size. Low normal left ventricular systolic function. LVEF 50 - 55%. Mild concentric hypertrophy. The right ventricle is not well visualized. Normal right ventricular size. Normal right ventricular systolic function. The left atrium is normal in size. Mild to moderately dilated right atrium. The mitral valve is not well visualized. No or trivial mitral valve regurgitation. No mitral valve stenosis. The aortic valve appears to have a tricuspid configuration (suboptimal   visualization). The aortic valve is not well visualized. No or trivial aortic valve regurgitation. No aortic valve stenosis. Mild tricuspid valve regurgitation. No tricuspid valve stenosis. The estimated right ventricular systolic pressure/PASP is 35-40 mmHg. The pulmonic valve is not well visualized. No or trivial pulmonic valve regurgitation. No pulmonic valve stenosis.     Monitor for diuretic needs  Monitor I&O trends  Monitor hemodynamics closely

## 2024-04-23 NOTE — SUBJECTIVE & OBJECTIVE
Past Medical History:   Diagnosis Date    BPH (benign prostatic hyperplasia)     CHF (congestive heart failure)     Chronic back pain     Chronic hypoxic respiratory failure, on home oxygen therapy     COPD (chronic obstructive pulmonary disease)     Coronary artery disease     Daily consumption of alcohol     Fibroadenoma of breast     History of tobacco abuse     Hypertension     Hypothyroidism, unspecified     Infrarenal abdominal aortic aneurysm (AAA) without rupture     Kidney stones     Neuropathy     NSTEMI (non-ST elevated myocardial infarction)     Personal history of colonic polyps     Shingles (herpes zoster) polyneuropathy      Past Surgical History:   Procedure Laterality Date    ADENOIDECTOMY      APPENDECTOMY      COLONOSCOPY      LUMBAR DISCECTOMY      REPAIR, RETINAL DETACHMENT      SPINAL CORD STIMULATOR IMPLANT      TONSILLECTOMY       Review of patient's allergies indicates:   Allergen Reactions    Sulfamethoxazole-trimethoprim Itching     Family History       Problem Relation (Age of Onset)    Colon cancer Maternal Grandfather    Coronary artery disease Sister    Emphysema Father    No Known Problems Mother          Tobacco Use    Smoking status: Former     Types: Cigarettes     Start date: 1/15/2024     Quit date: 1964     Years since quittin.0    Smokeless tobacco: Not on file   Substance and Sexual Activity    Alcohol use: Yes     Comment: 2 drinks hard liquor per day    Drug use: Not Currently    Sexual activity: Not on file       Review of Systems   Constitutional:  Positive for activity change. Negative for chills, fever and unexpected weight change.   Respiratory:  Positive for cough and shortness of breath.    Cardiovascular:  Positive for chest pain.   Gastrointestinal:  Positive for abdominal distention, abdominal pain and constipation.     Objective:     Vital Signs (Most Recent):  Temp: 98.4 °F (36.9 °C) (24 0744)  Pulse: 82 (24 0744)  Resp: (!) 22 (24  0818)  BP: 123/62 (04/23/24 0744)  SpO2: (!) 92 % (04/23/24 0818) Vital Signs (24h Range):  Temp:  [97.5 °F (36.4 °C)-98.6 °F (37 °C)] 98.4 °F (36.9 °C)  Pulse:  [] 82  Resp:  [18-25] 22  SpO2:  [84 %-98 %] 92 %  BP: ()/(51-68) 123/62   Weight: 80.6 kg (177 lb 11.1 oz); Body mass index is 24.78 kg/m².    Intake/Output Summary (Last 24 hours) at 4/23/2024 1151  Last data filed at 4/22/2024 1848  Gross per 24 hour   Intake 100 ml   Output --   Net 100 ml      Physical Exam  Vitals and nursing note reviewed.   Constitutional:       Appearance: He is ill-appearing.   HENT:      Head: Normocephalic.   Eyes:      Conjunctiva/sclera: Conjunctivae normal.   Cardiovascular:      Rate and Rhythm: Normal rate.   Pulmonary:      Effort: Pulmonary effort is normal.   Abdominal:      General: There is distension.      Tenderness: There is abdominal tenderness.   Musculoskeletal:         General: Normal range of motion.      Cervical back: Normal range of motion.   Skin:     General: Skin is warm and dry.   Neurological:      Mental Status: He is alert and oriented to person, place, and time.        Significant Labs:  CBC/Anemia Profile:  Recent Labs   Lab 04/22/24  1520 04/23/24  0529   WBC 17.33* 15.38*   HGB 11.5* 10.4*   HCT 34.5* 30.8*    314   MCV 94 93   RDW 12.7 12.6   IRON  --  15*   FERRITIN  --  1,205*   RETIC  --  0.9   FOLATE  --  5.6   [id number]  --  1164*   Chemistries:  Recent Labs   Lab 04/22/24  1520 04/23/24  0529   * 129*   K 4.7 4.4   CL 93* 94*   CO2 24 24   BUN 19 19   CREATININE 0.8 0.8   CALCIUM 9.4 9.1   ALBUMIN 3.0*  --    PROT 7.1  --    BILITOT 0.3  --    ALKPHOS 85  --    ALT 31  --    AST 28  --    MG  --  1.8     Significant Imaging:   CT: I have reviewed all pertinent results/findings within the past 24 hours and my personal findings are:  large RLL consolidation / mass; bilateral effusions left greater than right

## 2024-04-23 NOTE — ASSESSMENT & PLAN NOTE
Chronic, controlled. Latest blood pressure and vitals reviewed-     Temp:  [97.5 °F (36.4 °C)-98.6 °F (37 °C)]   Pulse:  []   Resp:  [18-25]   BP: ()/(51-68)   SpO2:  [84 %-97 %] .   Home meds for hypertension were reviewed and noted below.   Hypertension Medications               furosemide (LASIX) 20 MG tablet Take 20 mg by mouth.    hydrALAZINE (APRESOLINE) 50 MG tablet Take 50 mg by mouth 2 (two) times daily.            While in the hospital, will manage blood pressure as follows; trend    Will utilize p.r.n. blood pressure medication only if patient's blood pressure greater than 180/110 and he develops symptoms such as worsening chest pain or shortness of breath.

## 2024-04-23 NOTE — ASSESSMENT & PLAN NOTE
CT chest imaging in September 2023 with marked emphysematous changes and some bronchial wall thickening. No evidence of mass / consolidation / nodules on imaging. Trace bilateral effusions noted   Repeat CT chest yesterday reviewed in comparison to prior imaging. Low suspicion for underlying malignancy in light of no evidence of prior mass, lesion, nodule. No mediastinal lymphadenopathy  Obtain sputum culture; follow results  Differential diagnosis: decompensated heart failure, acute infection, malignancy, aspiration  Check legionella  Check fungitell and fungal immuno studies  May represent aspiration  Continue Rocephin and Azith  Follow cultures and adjust antibiotics as appropriate  May ultimately need bronchoscopy  Prior to bronchoscopy; likely needs antibiotic course with repeat imaging to assess for further work-up/evaluation needs  Follow chest imaging as needed  Follow fever and WBC trends

## 2024-04-23 NOTE — PT/OT/SLP PROGRESS
Occupational Therapy      Patient Name:  Jason Mayfield III   MRN:  [id number]    OT eval orders received. Chart review completed. Presented to room at 0730 and patient eating breakfast and requesting therapist return after completion. Returned at 0825 and patient with pulmonology. Will continue efforts.    Sheron Cordero, OT  4/23/2024

## 2024-04-23 NOTE — ASSESSMENT & PLAN NOTE
Patient with Hypoxic Respiratory failure which is Acute on chronic.  he is on home oxygen at 2 LPM. Supplemental oxygen was provided and noted-      .   Signs/symptoms of respiratory failure include- tachypnea, increased work of breathing, use of accessory muscles, and wheezing. Contributing diagnoses includes - COPD, Pleural effusion, Pneumonia, and lung mass  Labs and images were reviewed. Patient Has not had a recent ABG. Will treat underlying causes and adjust management of respiratory failure as follows-     Was given Solu-medrol x one dose per ED, defer additional steroids at this time, no significant wheezing noted on exam  Supplemental oxygen increased to 3 L/min NC at this time    Continue supplemental oxygen.  We will treat with antibiotics, nebs, pulmonary hygiene

## 2024-04-23 NOTE — ASSESSMENT & PLAN NOTE
Likely related to pneumonia/pleural effusions and coughing  Initial troponin is normal, we will trend  EKG reviewed, sinus tach with first-degree AV block and right bundle branch block, no concerning ST or T-wave changes  Cardiac monitoring

## 2024-04-23 NOTE — HPI
Jason Mayfield is a 83-year-old male with a past medical history significant for hypertension, NSTEMI, chronic diastolic heart failure, COPD / emphysema, chronic hypoxic respiratory failure on home oxygen of 2L/NC, BPH, hypothyroidism, AAA, chronic back pain, neuropathy, daily alcohol use, and former tobacco abuse who presented for evaluation of left lower chest pain and shortness of breath. Patient and family endorses a dry non-productive cough which had been ongoing for approximately 5days. Denies fever / chills or diarrhea at home. Endorses constipation and reports no bowel movement for almost two weeks. On exam, some mild LUQ tenderness with palpation; however, no guarding noted. Abdomen firm to touch with faint minimal bowel sounds. Due to his severe pain, the patient reports difficulty with taking deep breaths. Pain exacerbated by cough / deep inspiration. CTA chest completed at time of presentation demonstrates extensive emphysematous changes, bilateral pleural effusions, and RLL consolidation / mass. Patient was admitted by hospital medicine and pulmonary consulted for evaluation.     Of note, patient is followed by Dr. Gant with [facility] pulmonology. 60-pack year smoking history. Previous CT imaging in September 2023 with marked emphysematous changes and some bronchial wall thickening. No evidence of mass / consolidation / nodules on imaging. Trace bilateral effusions noted. Patient is a resident in [place], [place] with reports of 3 cats and 2 dogs in their home.     Patient recently seen by Dr. Gant in March 2023 who felt his shortness of breath was multifactorial. Patient is noted to have moderate impairment on his pulmonary testing and felt his conditions were exacerbated by his underlying diastolic heart failure. In addition, it was felt a significant portion of his SOB was likely contributed to physical deconditioning as he demonstrates activity intolerance with an inability to walk greater  "than 100ft without worsening dyspnea.    Interval history, f/u chief complaint shortness of breath:  4/24: patient attempted repositioning himself in bed with notable increased work of breathing with associated wheezing noted on exam. Patient transitioned to Vapotherm 25/0.50 this morning and given IV Solu-Medrol with nebulizer treatment. Patient pulmonary status improved. Significant abdominal distention which is likely contributing to his underlying shortness of breath. Indicates some stool production yesterday; however, sounds minimal in nature in comparison to his abdominal imaging.   4/25: sedated at time of my exam; patient with improved air movement throughout on exam this morning; currently on Vapotherm 25/0.50 this morning. Patient became extremely anxious, tachypneic, and mildly tachycardic overnight. RT attempted to give scheduled neb tx, but patient had an episode of vomiting. Case and plan of care discussed with son at bedside this morning.   4/26: Patient reports he feels "terrible" this morning; complaints are vague in nature. Endorses generalized weakness. Pulmonary status significantly improved. On Vapotherm 15/0.35. Abdomen significantly softer from previous exam. By reports, several large size bowel movements yesterday afternoon.   4/28: Pulmonary status stable; increased dyspnea this am following his breathing treatment and oral meds. Minimal mobilization as reported by family at bedside. Patient and family encouraged to increase mobility. Weaning supplemental oxygen; currently on nasal cannula at 8L/NC. Denies productive cough  4/29: pt reports he was unable to get his CT this AM bc he could not lie flat s/t SOB, remains on 4L NC, appears overall quite weak and debilitated - after discussion with pt and family it appears pt has had a downward trajectory since a hospitalization in last 2023, no appetite   4/30: s/p L thora yesterday w/800cc off, CT today still with decent size effusion- will plan " for additional thora in the AM, start Augmentin for 14 days  5/1: repeat thora to L side planned for today to drain the remaining fluid, pt tolerating breakfast, reports he slept all night last night, overall feeling better, 2 BMs last night too

## 2024-04-23 NOTE — ASSESSMENT & PLAN NOTE
Patient's anemia is currently controlled. Has not received any PRBCs to date. Etiology likely d/t  unknown, work up in progress  Current CBC reviewed-   Lab Results   Component Value Date    HGB 10.4 (L) 04/23/2024    HCT 30.8 (L) 04/23/2024     Monitor serial CBC and transfuse if patient becomes hemodynamically unstable, symptomatic or H/H drops below 7/21.    This is new finding, had normal H&H 6 months ago  Iron-deficiency, CEA negative  Check stool for occult blood

## 2024-04-23 NOTE — ASSESSMENT & PLAN NOTE
Patient's anemia is currently controlled. Has not received any PRBCs to date. Etiology likely d/t  unknown, work up in progress  Current CBC reviewed-   Lab Results   Component Value Date    HGB 11.5 (L) 04/22/2024    HCT 34.5 (L) 04/22/2024     Monitor serial CBC and transfuse if patient becomes hemodynamically unstable, symptomatic or H/H drops below 7/21.    This is new finding, had normal H&H 6 months ago  Iron studies ordered  Check stool for occult blood

## 2024-04-23 NOTE — ASSESSMENT & PLAN NOTE
Chronic, controlled. Latest blood pressure and vitals reviewed-     Temp:  [97.5 °F (36.4 °C)-98.6 °F (37 °C)]   Pulse:  []   Resp:  [18-25]   BP: (111-174)/(54-68)   SpO2:  [91 %-98 %] .   Home meds for hypertension were reviewed and noted below.   Hypertension Medications               furosemide (LASIX) 20 MG tablet Take 20 mg by mouth.    hydrALAZINE (APRESOLINE) 50 MG tablet Take 50 mg by mouth 2 (two) times daily.            While in the hospital, will manage blood pressure as follows; Continue home antihypertensive regimen    Will utilize p.r.n. blood pressure medication only if patient's blood pressure greater than 180/110 and he develops symptoms such as worsening chest pain or shortness of breath.

## 2024-04-23 NOTE — HPI
Patient is 83-year-old male with past medical history significant for hypertension, NSTEMI, diastolic congestive heart failure, COPD, chronic hypoxic respiratory failure on home O2 @ 2L/min NC, BPH,  fibroadenoma of breast, hypothyroidism, infrarenal AAA(3.4 cm),  chronic back pain, neuropathy, shingles infection, kidney stones, colonic polyps, daily alcohol use, and former tobacco abuse who presented to ED with complaints of chest pain and dyspnea.  He reports  that for the past 2-3 days he has been having left upper chest pain, moderate, worsened with deep breathing, chronic cough, with no radiation into neck, jaw, or arms.  He also reports some generalized weakness and trouble walking over the past 2-3 days as well.  He reports some wheezing although states that this is his baseline. He denies fever, chills , abdominal pain, nausea, vomiting, diarrhea, leg pain, dysuria, or worsening edema. he states that he is normally on 2 L per minute nasal cannula, however over the past couple of days he has turned it up to 3-4 liters/minute.  He is followed by Dr. Gant.  Vital signs on arrival to ED[facility]  with 98.3 temp, heart rate 107, respiratory rate 25, blood pressure 135/62, 91% SpO2 on 4 liters/minute nasal cannula.  He has remained afebrile while in ED. oxygen has been weaned to 3 liters/minute nasal cannula with SpO2 96%.  Lab workup reveals WBC 17.33, hemoglobin 11.5, hematocrit 34.5, sodium 131, potassium 4.7, chloride 93, CO2 24, BUN 19, creatinine 0.8, glucose 129, normal LFTs, BNP 85, troponin 0.010, lactic acid 1.0, procalcitonin 0.09,  and normal urinalysis.  EKG:  Sinus tach with first-degree AV block and right bundle-branch block, heart rate 102.  Chest x-ray notes thoracic spinal cord stimulator leads, normal heart size, mild aortic atherosclerosis, mild volume loss with vascular crowding in both lung bases.  CTA of chest shows extensive emphysema, moderate left-sided pleural effusion, mild  right-sided pleural effusion, moderate right basilar atelectasis versus consolidation and mild left basilar atelectasis versus consolidation, suggestion of mass in the right lower lobe perifissural region measuring 6.2 x 10 cm, worrisome for malignancy.  He was given Rocephin, DuoNeb, Solu-Medrol, gabapentin, and tramadol while in ED. Hospital Medicine was consulted for admission due to worsening hypoxic respiratory failure, pneumonia, and newly discovered lung mass.

## 2024-04-23 NOTE — PT/OT/SLP PROGRESS
Physical Therapy      Patient Name:  Jason Mayfield III   MRN:  [id number]    PCRISPIN SANTOS INITIATED THIS AM VIA CHART REVIEW, PT CURRENTLY EATING BREAKFAST REQUESTING THERAPIST TO RETURN LATER, WILL CONTINUE EFFORTS    Alaina Maza, PT  4/23/2024  1619

## 2024-04-23 NOTE — ASSESSMENT & PLAN NOTE
We will treat for pneumonia with Rocephin and Zithromax at this time  Follow-up blood cultures  Sputum culture ordered, patient does have productive sounding cough however difficult to bring up secretions  Monitor chest x-ray

## 2024-04-24 LAB
ANION GAP SERPL CALC-SCNC: 7 MMOL/L (ref 8–16)
BASOPHILS # BLD AUTO: 0.05 K/UL (ref 0–0.2)
BASOPHILS NFR BLD: 0.3 % (ref 0–1.9)
BUN SERPL-MCNC: 20 MG/DL (ref 8–23)
CALCIUM SERPL-MCNC: 8.6 MG/DL (ref 8.7–10.5)
CHLORIDE SERPL-SCNC: 93 MMOL/L (ref 95–110)
CO2 SERPL-SCNC: 28 MMOL/L (ref 23–29)
CREAT SERPL-MCNC: 0.6 MG/DL (ref 0.5–1.4)
DIFFERENTIAL METHOD BLD: ABNORMAL
EOSINOPHIL # BLD AUTO: 0 K/UL (ref 0–0.5)
EOSINOPHIL NFR BLD: 0.1 % (ref 0–8)
ERYTHROCYTE [DISTWIDTH] IN BLOOD BY AUTOMATED COUNT: 12.5 % (ref 11.5–14.5)
EST. GFR  (NO RACE VARIABLE): >60 ML/MIN/1.73 M^2
GLUCOSE SERPL-MCNC: 112 MG/DL (ref 70–110)
HCT VFR BLD AUTO: 31.4 % (ref 40–54)
HGB BLD-MCNC: 10.7 G/DL (ref 14–18)
IMM GRANULOCYTES # BLD AUTO: 0.19 K/UL (ref 0–0.04)
IMM GRANULOCYTES NFR BLD AUTO: 1 % (ref 0–0.5)
LYMPHOCYTES # BLD AUTO: 1.2 K/UL (ref 1–4.8)
LYMPHOCYTES NFR BLD: 6.1 % (ref 18–48)
MAGNESIUM SERPL-MCNC: 2.9 MG/DL (ref 1.6–2.6)
MCH RBC QN AUTO: 32 PG (ref 27–31)
MCHC RBC AUTO-ENTMCNC: 34.1 G/DL (ref 32–36)
MCV RBC AUTO: 94 FL (ref 82–98)
MONOCYTES # BLD AUTO: 1.8 K/UL (ref 0.3–1)
MONOCYTES NFR BLD: 9.3 % (ref 4–15)
NEUTROPHILS # BLD AUTO: 15.7 K/UL (ref 1.8–7.7)
NEUTROPHILS NFR BLD: 83.2 % (ref 38–73)
NRBC BLD-RTO: 0 /100 WBC
OB PNL STL: NEGATIVE
PLATELET # BLD AUTO: 344 K/UL (ref 150–450)
PMV BLD AUTO: 9.5 FL (ref 9.2–12.9)
POTASSIUM SERPL-SCNC: 5.3 MMOL/L (ref 3.5–5.1)
RBC # BLD AUTO: 3.34 M/UL (ref 4.6–6.2)
SODIUM SERPL-SCNC: 128 MMOL/L (ref 136–145)
WBC # BLD AUTO: 18.89 K/UL (ref 3.9–12.7)

## 2024-04-24 PROCEDURE — 25000242 PHARM REV CODE 250 ALT 637 W/ HCPCS: Performed by: INTERNAL MEDICINE

## 2024-04-24 PROCEDURE — 87449 NOS EACH ORGANISM AG IA: CPT | Performed by: NURSE PRACTITIONER

## 2024-04-24 PROCEDURE — 27000221 HC OXYGEN, UP TO 24 HOURS

## 2024-04-24 PROCEDURE — 85025 COMPLETE CBC W/AUTO DIFF WBC: CPT | Performed by: NURSE PRACTITIONER

## 2024-04-24 PROCEDURE — 25000003 PHARM REV CODE 250: Performed by: NURSE PRACTITIONER

## 2024-04-24 PROCEDURE — 25000003 PHARM REV CODE 250: Performed by: INTERNAL MEDICINE

## 2024-04-24 PROCEDURE — 82272 OCCULT BLD FECES 1-3 TESTS: CPT | Performed by: NURSE PRACTITIONER

## 2024-04-24 PROCEDURE — 63600175 PHARM REV CODE 636 W HCPCS: Mod: JZ,JG | Performed by: INTERNAL MEDICINE

## 2024-04-24 PROCEDURE — 99900035 HC TECH TIME PER 15 MIN (STAT)

## 2024-04-24 PROCEDURE — 80048 BASIC METABOLIC PNL TOTAL CA: CPT | Performed by: NURSE PRACTITIONER

## 2024-04-24 PROCEDURE — 27100092 HC HIGH FLOW DELIVERY CANNULA

## 2024-04-24 PROCEDURE — 83735 ASSAY OF MAGNESIUM: CPT | Performed by: NURSE PRACTITIONER

## 2024-04-24 PROCEDURE — 94799 UNLISTED PULMONARY SVC/PX: CPT

## 2024-04-24 PROCEDURE — 63600175 PHARM REV CODE 636 W HCPCS: Performed by: NURSE PRACTITIONER

## 2024-04-24 PROCEDURE — 94640 AIRWAY INHALATION TREATMENT: CPT

## 2024-04-24 PROCEDURE — 97530 THERAPEUTIC ACTIVITIES: CPT

## 2024-04-24 PROCEDURE — 36415 COLL VENOUS BLD VENIPUNCTURE: CPT | Performed by: NURSE PRACTITIONER

## 2024-04-24 PROCEDURE — 86612 BLASTOMYCES ANTIBODY: CPT | Performed by: NURSE PRACTITIONER

## 2024-04-24 PROCEDURE — 97162 PT EVAL MOD COMPLEX 30 MIN: CPT

## 2024-04-24 PROCEDURE — 27100171 HC OXYGEN HIGH FLOW UP TO 24 HOURS

## 2024-04-24 PROCEDURE — 11000001 HC ACUTE MED/SURG PRIVATE ROOM

## 2024-04-24 PROCEDURE — 94761 N-INVAS EAR/PLS OXIMETRY MLT: CPT

## 2024-04-24 PROCEDURE — 25000242 PHARM REV CODE 250 ALT 637 W/ HCPCS: Performed by: NURSE PRACTITIONER

## 2024-04-24 PROCEDURE — 86635 COCCIDIOIDES ANTIBODY: CPT | Performed by: NURSE PRACTITIONER

## 2024-04-24 PROCEDURE — 27000249 HC VAPOTHERM CIRCUIT

## 2024-04-24 PROCEDURE — 97166 OT EVAL MOD COMPLEX 45 MIN: CPT

## 2024-04-24 RX ORDER — IPRATROPIUM BROMIDE AND ALBUTEROL SULFATE 2.5; .5 MG/3ML; MG/3ML
3 SOLUTION RESPIRATORY (INHALATION) EVERY 4 HOURS
Status: DISCONTINUED | OUTPATIENT
Start: 2024-04-24 | End: 2024-04-24

## 2024-04-24 RX ORDER — SYRING-NEEDL,DISP,INSUL,0.3 ML 29 G X1/2"
296 SYRINGE, EMPTY DISPOSABLE MISCELLANEOUS
Status: COMPLETED | OUTPATIENT
Start: 2024-04-24 | End: 2024-04-24

## 2024-04-24 RX ORDER — POLYETHYLENE GLYCOL 3350 17 G/17G
17 POWDER, FOR SOLUTION ORAL 2 TIMES DAILY
Status: DISCONTINUED | OUTPATIENT
Start: 2024-04-24 | End: 2024-04-29

## 2024-04-24 RX ORDER — METHYLPREDNISOLONE SOD SUCC 125 MG
125 VIAL (EA) INJECTION ONCE
Status: COMPLETED | OUTPATIENT
Start: 2024-04-24 | End: 2024-04-24

## 2024-04-24 RX ORDER — METHYLPREDNISOLONE SOD SUCC 125 MG
60 VIAL (EA) INJECTION EVERY 6 HOURS
Status: DISCONTINUED | OUTPATIENT
Start: 2024-04-24 | End: 2024-04-26

## 2024-04-24 RX ORDER — PSEUDOEPHEDRINE/ACETAMINOPHEN 30MG-500MG
100 TABLET ORAL
Status: COMPLETED | OUTPATIENT
Start: 2024-04-24 | End: 2024-04-24

## 2024-04-24 RX ORDER — LACTULOSE 10 G/15ML
20 SOLUTION ORAL
Status: DISPENSED | OUTPATIENT
Start: 2024-04-24 | End: 2024-04-24

## 2024-04-24 RX ORDER — ALBUTEROL SULFATE 0.83 MG/ML
2.5 SOLUTION RESPIRATORY (INHALATION) EVERY 4 HOURS
Status: DISCONTINUED | OUTPATIENT
Start: 2024-04-24 | End: 2024-04-29

## 2024-04-24 RX ORDER — SYRING-NEEDL,DISP,INSUL,0.3 ML 29 G X1/2"
296 SYRINGE, EMPTY DISPOSABLE MISCELLANEOUS ONCE
Status: COMPLETED | OUTPATIENT
Start: 2024-04-24 | End: 2024-04-24

## 2024-04-24 RX ORDER — LEVALBUTEROL INHALATION SOLUTION 0.63 MG/3ML
0.63 SOLUTION RESPIRATORY (INHALATION) EVERY 8 HOURS
Status: DISCONTINUED | OUTPATIENT
Start: 2024-04-24 | End: 2024-04-24

## 2024-04-24 RX ORDER — AMOXICILLIN 250 MG
2 CAPSULE ORAL 2 TIMES DAILY
Status: DISCONTINUED | OUTPATIENT
Start: 2024-04-24 | End: 2024-05-02 | Stop reason: HOSPADM

## 2024-04-24 RX ADMIN — GABAPENTIN 1200 MG: 400 CAPSULE ORAL at 08:04

## 2024-04-24 RX ADMIN — ATORVASTATIN CALCIUM 40 MG: 40 TABLET, FILM COATED ORAL at 08:04

## 2024-04-24 RX ADMIN — IPRATROPIUM BROMIDE 0.5 MG: 0.5 SOLUTION RESPIRATORY (INHALATION) at 12:04

## 2024-04-24 RX ADMIN — LACTULOSE 20 G: 20 SOLUTION ORAL at 03:04

## 2024-04-24 RX ADMIN — MAGNESIUM CITRATE 296 ML: 1.75 LIQUID ORAL at 06:04

## 2024-04-24 RX ADMIN — CEFTRIAXONE 2 G: 2 INJECTION, POWDER, FOR SOLUTION INTRAMUSCULAR; INTRAVENOUS at 05:04

## 2024-04-24 RX ADMIN — ALBUTEROL SULFATE 2.5 MG: 2.5 SOLUTION RESPIRATORY (INHALATION) at 04:04

## 2024-04-24 RX ADMIN — AZITHROMYCIN MONOHYDRATE 500 MG: 500 INJECTION, POWDER, LYOPHILIZED, FOR SOLUTION INTRAVENOUS at 12:04

## 2024-04-24 RX ADMIN — METHYLPREDNISOLONE SODIUM SUCCINATE 125 MG: 125 INJECTION, POWDER, FOR SOLUTION INTRAMUSCULAR; INTRAVENOUS at 08:04

## 2024-04-24 RX ADMIN — HYDRALAZINE HYDROCHLORIDE 50 MG: 25 TABLET, FILM COATED ORAL at 08:04

## 2024-04-24 RX ADMIN — LACTULOSE 20 G: 20 SOLUTION ORAL at 09:04

## 2024-04-24 RX ADMIN — GABAPENTIN 1200 MG: 400 CAPSULE ORAL at 03:04

## 2024-04-24 RX ADMIN — BUDESONIDE INHALATION 0.5 MG: 0.5 SUSPENSION RESPIRATORY (INHALATION) at 07:04

## 2024-04-24 RX ADMIN — METHYLPREDNISOLONE SODIUM SUCCINATE 60 MG: 125 INJECTION, POWDER, FOR SOLUTION INTRAMUSCULAR; INTRAVENOUS at 05:04

## 2024-04-24 RX ADMIN — MORPHINE SULFATE 2 MG: 2 INJECTION, SOLUTION INTRAMUSCULAR; INTRAVENOUS at 08:04

## 2024-04-24 RX ADMIN — POLYETHYLENE GLYCOL 3350 17 G: 17 POWDER, FOR SOLUTION ORAL at 08:04

## 2024-04-24 RX ADMIN — IPRATROPIUM BROMIDE 0.5 MG: 0.5 SOLUTION RESPIRATORY (INHALATION) at 07:04

## 2024-04-24 RX ADMIN — METHYLPREDNISOLONE SODIUM SUCCINATE 60 MG: 125 INJECTION, POWDER, FOR SOLUTION INTRAMUSCULAR; INTRAVENOUS at 11:04

## 2024-04-24 RX ADMIN — MELATONIN TAB 3 MG 6 MG: 3 TAB at 08:04

## 2024-04-24 RX ADMIN — FINASTERIDE 5 MG: 5 TABLET, FILM COATED ORAL at 08:04

## 2024-04-24 RX ADMIN — IPRATROPIUM BROMIDE AND ALBUTEROL SULFATE 3 ML: 2.5; .5 SOLUTION RESPIRATORY (INHALATION) at 01:04

## 2024-04-24 RX ADMIN — ARFORMOTEROL TARTRATE 15 MCG: 15 SOLUTION RESPIRATORY (INHALATION) at 07:04

## 2024-04-24 RX ADMIN — SODIUM CHLORIDE 500 ML: 9 INJECTION, SOLUTION INTRAVENOUS at 08:04

## 2024-04-24 RX ADMIN — LACTULOSE 20 G: 20 SOLUTION ORAL at 08:04

## 2024-04-24 RX ADMIN — ALBUTEROL SULFATE 2.5 MG: 2.5 SOLUTION RESPIRATORY (INHALATION) at 07:04

## 2024-04-24 RX ADMIN — LACTULOSE 20 G: 20 SOLUTION ORAL at 11:04

## 2024-04-24 RX ADMIN — SENNOSIDES AND DOCUSATE SODIUM 2 TABLET: 50; 8.6 TABLET ORAL at 08:04

## 2024-04-24 RX ADMIN — LACTULOSE 20 G: 20 SOLUTION ORAL at 05:04

## 2024-04-24 RX ADMIN — Medication 100 ML: at 08:04

## 2024-04-24 RX ADMIN — ALBUTEROL SULFATE 2.5 MG: 2.5 SOLUTION RESPIRATORY (INHALATION) at 11:04

## 2024-04-24 RX ADMIN — MAGNESIUM CITRATE 296 ML: 1.75 LIQUID ORAL at 08:04

## 2024-04-24 NOTE — PROGRESS NOTES
[facility]  Pulmonology Progress Note    Patient Name: Jason Mayfield III   MRN: [id number]  Admission Date: 4/22/2024   Hospital Length of Stay: 2 days  Code Status: Full Code    Attending Provider: Gisella Aguilar MD    Subjective:   History of present illness:  Jason Mayfield is a 83-year-old male with a past medical history significant for hypertension, NSTEMI, chronic diastolic heart failure, COPD / emphysema, chronic hypoxic respiratory failure on home oxygen of 2L/NC, BPH, hypothyroidism, AAA, chronic back pain, neuropathy, daily alcohol use, and former tobacco abuse who presented for evaluation of left lower chest pain and shortness of breath. Patient and family endorses a dry non-productive cough which had been ongoing for approximately 5days. Denies fever / chills or diarrhea at home. Endorses constipation and reports no bowel movement for almost two weeks. On exam, some mild LUQ tenderness with palpation; however, no guarding noted. Abdomen firm to touch with faint minimal bowel sounds. Due to his severe pain, the patient reports difficulty with taking deep breaths. Pain exacerbated by cough / deep inspiration. CTA chest completed at time of presentation demonstrates extensive emphysematous changes, bilateral pleural effusions, and RLL consolidation / mass. Patient was admitted by hospital medicine and pulmonary consulted for evaluation.     Of note, patient is followed by Dr. Gant with [facility] pulmonology. 60-pack year smoking history. Previous CT imaging in September 2023 with marked emphysematous changes and some bronchial wall thickening. No evidence of mass / consolidation / nodules on imaging. Trace bilateral effusions noted. Patient is a resident in [place], [place] with reports of 3 cats and 2 dogs in their home.     Patient recently seen by Dr. Gant in March 2023 who felt his shortness of breath was multifactorial. Patient is noted to have  moderate impairment on his pulmonary testing and felt his conditions were exacerbated by his underlying diastolic heart failure. In addition, it was felt a significant portion of his SOB was likely contributed to physical deconditioning as he demonstrates activity intolerance with an inability to walk greater than 100ft without worsening dyspnea.    Interval history, f/u chief complaint shortness of breath:  4/24: patient attempted repositioning himself in bed with notable increased work of breathing with associated wheezing noted on exam. Patient transitioned to Vapotherm 25/0.50 this morning and given IV Solu-Medrol with nebulizer treatment. Patient pulmonary status improved. Significant abdominal distention which is likely contributing to his underlying shortness of breath. Indicates some stool production yesterday; however, sounds minimal in nature in comparison to his abdominal imaging.     Objective:     Vital Signs (Most Recent):  Temp: 98.2 °F (36.8 °C) (04/24/24 0740)  Pulse: 96 (04/24/24 0849)  Resp: (!) 26 (04/24/24 0808)  BP: 138/82 (04/24/24 0740)  SpO2: 95 % (04/24/24 0808) Vital Signs (24h Range):  Temp:  [97.6 °F (36.4 °C)-98.2 °F (36.8 °C)] 98.2 °F (36.8 °C)  Pulse:  [] 96  Resp:  [18-26] 26  SpO2:  [84 %-95 %] 95 %  BP: (122-140)/(58-82) 138/82   Weight: 80.6 kg (177 lb 11.1 oz);  Body mass index is 24.78 kg/m².    Intake/Output Summary (Last 24 hours) at 4/24/2024 0943  Last data filed at 4/23/2024 1735  Gross per 24 hour   Intake --   Output 850 ml   Net -850 ml      Physical Exam  Vitals and nursing note reviewed.   HENT:      Head: Normocephalic.   Eyes:      Conjunctiva/sclera: Conjunctivae normal.   Cardiovascular:      Rate and Rhythm: Tachycardia present.   Pulmonary:      Effort: Respiratory distress present.      Breath sounds: Wheezing present.   Abdominal:      General: There is distension.   Musculoskeletal:         General: Normal range of motion.      Cervical back: Normal  range of motion.   Skin:     General: Skin is warm and dry.   Neurological:      Mental Status: He is alert and oriented to person, place, and time.   Psychiatric:         Mood and Affect: Mood normal.         Review of Systems: Negative except as indicated in HPI    Significant Labs:  CBC/Anemia Profile:  Recent Labs   Lab 04/22/24  1520 04/23/24  0529 04/24/24  0042 04/24/24  0558   WBC 17.33* 15.38*  --  18.89*   HGB 11.5* 10.4*  --  10.7*   HCT 34.5* 30.8*  --  31.4*    314  --  344   MCV 94 93  --  94   RDW 12.7 12.6  --  12.5   IRON  --  15*  --   --    FERRITIN  --  1,205*  --   --    RETIC  --  0.9  --   --    FOLATE  --  5.6  --   --    [id number]  --  1164*  --   --    OCCULTBLOOD  --   --  Negative  --    Chemistries:  Recent Labs   Lab 04/22/24  1520 04/23/24  0529 04/24/24  0558   * 129* 128*   K 4.7 4.4 5.3*   CL 93* 94* 93*   CO2 24 24 28   BUN 19 19 20   CREATININE 0.8 0.8 0.6   CALCIUM 9.4 9.1 8.6*   ALBUMIN 3.0*  --   --    PROT 7.1  --   --    BILITOT 0.3  --   --    ALKPHOS 85  --   --    ALT 31  --   --    AST 28  --   --    MG  --  1.8 2.9*     Assessment/Plan:   Acute on Chronic respiratory failure with hypoxia  COPD exacerbation  Right lower lobe lung mass  Bilateral pleural effusion  Pleuritic chest pain  Pneumonia  Patient with chronic hypoxic respiratory failure on home oxygen of 2L/NC.     CT chest imaging in September 2023 with marked emphysematous changes and some bronchial wall thickening. No evidence of mass / consolidation / nodules on imaging. Trace bilateral effusions noted   Repeat CT chest yesterday reviewed in comparison to prior imaging. Low suspicion for underlying malignancy in light of no evidence of prior mass, lesion, nodule. No mediastinal lymphadenopathy  Sputum culture pending; follow results and adjust antibiotics as appropriate  Differential diagnosis: decompensated heart failure, acute infection, malignancy, aspiration  Legionella, fungitell and fungal  immuno studies pending  Continue Rocephin and Azith  Appears to have acute COPD exacerbation this morning as evidenced by increased work of breathing with associated wheezing  Continue supplemental oxygen as needed; titrate to  maintain sats 90% or greater  Vapotherm added, currently on 25/0.50 for work of breathing  Add IV Solu-medrol; given 125mg IV once followed by 60mg q6H  CT abdomen with significant constipation  Suspect his elevated diaphragm is contributing to his shortness of breath. Left sided pleuritic pain and abdominal pain improved from yesterday  No acute indication for thoracentesis at this time; will follow imaging for thoracentesis needs  Follow chest imaging as needed  Follow fever and WBC trends  Close pulmonary follow-up upon discharge     Critical care time spent on the evaluation and treatment of acute respiratory failure requiring HFNC, review of pertinent labs and imaging studies, discussions with consulting providers and discussions with patient/family: 43 minutes.     Deandra Hunt NP  Pulmonary and Critical Care  [facility]

## 2024-04-24 NOTE — PLAN OF CARE
OT hector completed. Recommendation TBD with pt participation.  Mod A x2 for supine scoot.  Pt with significant SOB.

## 2024-04-24 NOTE — PROGRESS NOTES
[facility]  [facility] Medicine  Progress Note    Patient Name: Jason Mayfield III  MRN: [id number]  Patient Class: IP- Inpatient   Admission Date: 4/22/2024  Length of Stay: 2 days  Attending Physician: Gisella Aguilar MD  Primary Care Provider: TANNER Mane MD        Subjective:     Principal Problem:Chronic respiratory failure with hypoxia        HPI:  Patient is 83-year-old male with past medical history significant for hypertension, NSTEMI, diastolic congestive heart failure, COPD, chronic hypoxic respiratory failure on home O2 @ 2L/min NC, BPH,  fibroadenoma of breast, hypothyroidism, infrarenal AAA(3.4 cm),  chronic back pain, neuropathy, shingles infection, kidney stones, colonic polyps, daily alcohol use, and former tobacco abuse who presented to ED with complaints of chest pain and dyspnea.  He reports  that for the past 2-3 days he has been having left upper chest pain, moderate, worsened with deep breathing, chronic cough, with no radiation into neck, jaw, or arms.  He also reports some generalized weakness and trouble walking over the past 2-3 days as well.  He reports some wheezing although states that this is his baseline. He denies fever, chills , abdominal pain, nausea, vomiting, diarrhea, leg pain, dysuria, or worsening edema. he states that he is normally on 2 L per minute nasal cannula, however over the past couple of days he has turned it up to 3-4 liters/minute.  He is followed by Dr. Gant.  Vital signs on arrival to ED-[facility]  with 98.3 temp, heart rate 107, respiratory rate 25, blood pressure 135/62, 91% SpO2 on 4 liters/minute nasal cannula.  He has remained afebrile while in ED. oxygen has been weaned to 3 liters/minute nasal cannula with SpO2 96%.  Lab workup reveals WBC 17.33, hemoglobin 11.5, hematocrit 34.5, sodium 131, potassium 4.7, chloride 93, CO2 24, BUN 19, creatinine 0.8, glucose 129, normal LFTs, BNP 85, troponin 0.010, lactic acid 1.0, procalcitonin 0.09,   and normal urinalysis.  EKG:  Sinus tach with first-degree AV block and right bundle-branch block, heart rate 102.  Chest x-ray notes thoracic spinal cord stimulator leads, normal heart size, mild aortic atherosclerosis, mild volume loss with vascular crowding in both lung bases.  CTA of chest shows extensive emphysema, moderate left-sided pleural effusion, mild right-sided pleural effusion, moderate right basilar atelectasis versus consolidation and mild left basilar atelectasis versus consolidation, suggestion of mass in the right lower lobe perifissural region measuring 6.2 x 10 cm, worrisome for malignancy.  He was given Rocephin, DuoNeb, Solu-Medrol, gabapentin, and tramadol while in ED. Hospital Medicine was consulted for admission due to worsening hypoxic respiratory failure, pneumonia, and newly discovered lung mass.    Overview/Hospital Course:    Patient was attempting to eat breakfast and reposition himself in bed with increased work of breathing and wheezing on exam.  Pulmonary evaluated the patient and he was started on Vapotherm 25/50 Solu-Medrol IV and nebulizer.  Patient's shortness of breath improved.  He still has significant abdominal distention with minimal stool output.    Interval History:  Patient seen and examined with wife at bedside.  Discussed with Pulmonary.  Patient had significant shortness of breath this morning when attempting to reposition himself in bed.  He was transitioned from nasal cannula to Vapotherm with improvement in his symptoms.  He has still not had significant bowel movement.    Review of Systems   Constitutional:  Positive for activity change, appetite change and fatigue.   Respiratory:  Positive for chest tightness, shortness of breath and wheezing.    Cardiovascular:  Negative for chest pain, palpitations and leg swelling.   Gastrointestinal:  Positive for abdominal pain and constipation.   Musculoskeletal:  Positive for arthralgias.   Neurological:  Positive for  weakness.   All other systems reviewed and are negative.    Objective:     Vital Signs (Most Recent):  Temp: 98 °F (36.7 °C) (04/24/24 1212)  Pulse: 110 (04/24/24 1646)  Resp: (!) 24 (04/24/24 1646)  BP: (!) 168/76 (04/24/24 1212)  SpO2: (!) 94 % (04/24/24 1646) Vital Signs (24h Range):  Temp:  [97.6 °F (36.4 °C)-98.2 °F (36.8 °C)] 98 °F (36.7 °C)  Pulse:  [] 110  Resp:  [17-26] 24  SpO2:  [84 %-95 %] 94 %  BP: (138-168)/(76-82) 168/76     Weight: 80.6 kg (177 lb 11.1 oz)  Body mass index is 24.78 kg/m².    Intake/Output Summary (Last 24 hours) at 4/24/2024 1701  Last data filed at 4/23/2024 1735  Gross per 24 hour   Intake --   Output 450 ml   Net -450 ml         Physical Exam  Vitals reviewed.   Constitutional:       Appearance: He is ill-appearing.   HENT:      Head: Normocephalic and atraumatic.      Mouth/Throat:      Mouth: Mucous membranes are moist.      Pharynx: Oropharynx is clear.   Eyes:      Extraocular Movements: Extraocular movements intact.      Conjunctiva/sclera: Conjunctivae normal.   Cardiovascular:      Rate and Rhythm: Regular rhythm. Tachycardia present.      Pulses: Normal pulses.      Heart sounds: Normal heart sounds.   Pulmonary:      Effort: Respiratory distress present.      Breath sounds: Wheezing present.   Abdominal:      General: Bowel sounds are normal. There is distension.      Palpations: Abdomen is soft.      Tenderness: There is no abdominal tenderness. There is no guarding or rebound.   Musculoskeletal:         General: Normal range of motion.      Cervical back: Normal range of motion and neck supple.   Skin:     General: Skin is warm and dry.   Neurological:      Mental Status: He is alert and oriented to person, place, and time. Mental status is at baseline.      Motor: Weakness present.   Psychiatric:         Mood and Affect: Mood normal.         Behavior: Behavior normal.         Thought Content: Thought content normal.             Significant Labs: All pertinent  labs within the past 24 hours have been reviewed.  CBC:   Recent Labs   Lab 04/23/24  0529 04/24/24  0558   WBC 15.38* 18.89*   HGB 10.4* 10.7*   HCT 30.8* 31.4*    344     CMP:   Recent Labs   Lab 04/23/24  0529 04/24/24  0558   * 128*   K 4.4 5.3*   CL 94* 93*   CO2 24 28   * 112*   BUN 19 20   CREATININE 0.8 0.6   CALCIUM 9.1 8.6*   ANIONGAP 11 7*       Respiratory Culture:   Recent Labs   Lab 04/23/24  0026 04/23/24  1407   GSRESP >10epis/lfp and <than many WBC's  Predominance of oropharyngeal brian. Please recollect. <10 epithelial cells per low power field.  Rare WBC's  No organisms seen   RESPIRATORYC Specimen inadequate - culture not performed  --        Significant Imaging: I have reviewed all pertinent imaging results/findings within the past 24 hours.    Assessment/Plan:      * Chronic respiratory failure with hypoxia  Patient with Hypoxic Respiratory failure which is Acute on chronic.  he is on home oxygen at 2 LPM. Supplemental oxygen was provided and noted- Oxygen Concentration (%):  [50] 50    .   Signs/symptoms of respiratory failure include- tachypnea, increased work of breathing, use of accessory muscles, and wheezing. Contributing diagnoses includes - COPD, Pleural effusion, Pneumonia, and lung mass  Labs and images were reviewed. Patient Has not had a recent ABG. Will treat underlying causes and adjust management of respiratory failure as follows-     Was given Solu-medrol x one dose per ED, with increased shortness breath and wheezing Solu-Medrol as we will do every 6 hours in addition to nebs.  Supplemental oxygen has been increased to Vapotherm at 25/50.      Continue supplemental oxygen.  We will treat with antibiotics, nebs, pulmonary hygiene    Anemia  Patient's anemia is currently controlled. Has not received any PRBCs to date. Etiology likely d/t  unknown, work up in progress  Current CBC reviewed-   Lab Results   Component Value Date    HGB 10.7 (L) 04/24/2024     HCT 31.4 (L) 04/24/2024     Monitor serial CBC and transfuse if patient becomes hemodynamically unstable, symptomatic or H/H drops below 7/21.    This is new finding, had normal H&H 6 months ago  Iron-deficiency, CEA negative  Check stool for occult blood      Hyponatremia  Patient has hyponatremia which is controlled,We will aim to correct the sodium by 4-6mEq in 24 hours. We will monitor sodium Daily. The hyponatremia is due to Dehydration/hypovolemia. We will treat the hyponatremia with IV fluids as follows: NS at 75 ml/hr. The patient's sodium results have been reviewed and are listed below.  Recent Labs   Lab 04/24/24  0558   *     We will continue to trend    Bilateral pleural effusion  Patient found to have moderate pleural effusion on imaging. I have personally reviewed and interpreted the following imaging: Xray and CT. A thoracentesis was deferred pending pulmonology consultation.  Most likely etiology includes  possible malignancy with new lung mass discovered on CT scan, and pneumonia    Holding off on diuresis at this time, as he appears dry  Patient given IV fluids Kannan assess in a.m.      BPH (benign prostatic hyperplasia)  Continue finasteride      Chronic diastolic congestive heart failure  Patient is identified as having Diastolic (HFpEF) heart failure that is Chronic. CHF is currently controlled. Latest ECHO performed and demonstrates- No results found for this or any previous visit.  Monitor clinical status closely. Monitor on telemetry. Patient is off CHF pathway.  Monitor strict Is&Os and daily weights.  Fluid restriction not indicated at this time. Cardiology has not been consulted. Continue to stress to patient importance of self efficacy and  on diet for CHF. Last BNP reviewed- and noted below   Recent Labs   Lab 04/22/24  1520   BNP 85       Holding home Lasix 20 mg daily at this time as patient appears dry on exam    COPD exacerbation  Patient's COPD is with exacerbation  noted by continued dyspnea and worsening of baseline hypoxia currently.  Patient is currently off COPD Pathway. Continue scheduled inhalers Antibiotics and Supplemental oxygen and monitor respiratory status closely.   Was given 1 dose of IV Solu-Medrol with significant improvement, we will hold off on additional steroids at this time, add back PRN  Nebs ordered every 6 hours    Primary hypertension  Chronic, controlled. Latest blood pressure and vitals reviewed-     Temp:  [97.6 °F (36.4 °C)-98.2 °F (36.8 °C)]   Pulse:  []   Resp:  [17-26]   BP: (138-168)/(76-82)   SpO2:  [84 %-95 %] .   Home meds for hypertension were reviewed and noted below.   Hypertension Medications               furosemide (LASIX) 20 MG tablet Take 20 mg by mouth.    hydrALAZINE (APRESOLINE) 50 MG tablet Take 50 mg by mouth 2 (two) times daily.            While in the hospital, will manage blood pressure as follows; trend    Will utilize p.r.n. blood pressure medication only if patient's blood pressure greater than 180/110 and he develops symptoms such as worsening chest pain or shortness of breath.    Pleuritic chest pain  Likely related to pneumonia/pleural effusions and coughing  Initial troponin is normal, we will trend  EKG reviewed, sinus tach with first-degree AV block and right bundle branch block, no concerning ST or T-wave changes  Cardiac monitoring    Questionably related to distended abdomen some improvement      Pneumonia  We will treat for pneumonia with Rocephin and Zithromax at this time  Follow-up blood cultures  Sputum culture ordered, patient does have productive sounding cough however difficult to bring up secretions  Monitor chest x-ray      Right lower lobe lung mass  Mass in the right lower lobe in the roseanne fissural region measures 6.2 by 10 cm worrisome for malignancy  Pulmonology consulted  Uncertain as his last CT scan in September was negative we will continue to follow      VTE Risk Mitigation (From  admission, onward)           Ordered     Reason for No Pharmacological VTE Prophylaxis  Once        Comments: Possible procedure   Question:  Reasons:  Answer:  Physician Provided (leave comment)    04/23/24 0030     IP VTE HIGH RISK PATIENT  Once         04/23/24 0030     Place sequential compression device  Until discontinued         04/23/24 0030                    Discharge Planning   ERIKA:      Code Status: Full Code   Is the patient medically ready for discharge?:     Reason for patient still in hospital (select all that apply): Patient trending condition, Treatment, and Consult recommendations  Discharge Plan A: Home with family, Home Health                  Gisella Callejas MD  Department of Hospital Medicine   [facility]

## 2024-04-24 NOTE — PLAN OF CARE
312/312 GISEL Mayfield III is a 83 y.o.male admitted on 4/22/2024 for Chronic respiratory failure with hypoxia   Code Status: Full Code MRN: [id number]   Review of patient's allergies indicates:   Allergen Reactions    Sulfamethoxazole-trimethoprim Itching     Past Medical History:   Diagnosis Date    BPH (benign prostatic hyperplasia)     CHF (congestive heart failure)     Chronic back pain     Chronic hypoxic respiratory failure, on home oxygen therapy     COPD (chronic obstructive pulmonary disease)     Coronary artery disease     Daily consumption of alcohol     Fibroadenoma of breast     History of tobacco abuse     Hypertension     Hypothyroidism, unspecified     Infrarenal abdominal aortic aneurysm (AAA) without rupture     Kidney stones     Neuropathy     NSTEMI (non-ST elevated myocardial infarction)     Personal history of colonic polyps     Shingles (herpes zoster) polyneuropathy       PRN meds    acetaminophen, 650 mg, Q6H PRN  bisacodyL, 10 mg, Daily PRN  cyclobenzaprine, 10 mg, TID PRN  melatonin, 6 mg, Nightly PRN  morphine, 2 mg, Q4H PRN  ondansetron, 4 mg, Q8H PRN  sodium chloride 0.9%, 10 mL, Q12H PRN  traMADoL, 50 mg, Q6H PRN      Chart check completed. Will continue plan of care.               Lead Monitored: Lead II Rhythm: normal sinus rhythm Frequency/Ectopy: PVCs  Cardiac/Telemetry Box Number: [id number]  VTE Required Core Measure: Pharmacological prophylaxis initiated/maintained Last Bowel Movement: 04/24/24  Diet Low Sodium, 2gm  Voiding Characteristics: external catheter  Neo Score: 15  Fall Risk Score: 13  Accucheck []   Freq?      Lines/Drains/Airways       Peripheral Intravenous Line  Duration                  Peripheral IV - Single Lumen 04/23/24 0102 22 G Posterior;Right Forearm 1 day

## 2024-04-24 NOTE — ASSESSMENT & PLAN NOTE
Patient has hyponatremia which is controlled,We will aim to correct the sodium by 4-6mEq in 24 hours. We will monitor sodium Daily. The hyponatremia is due to Dehydration/hypovolemia. We will treat the hyponatremia with IV fluids as follows: NS at 75 ml/hr. The patient's sodium results have been reviewed and are listed below.  Recent Labs   Lab 04/24/24  0558   *     We will continue to trend

## 2024-04-24 NOTE — ASSESSMENT & PLAN NOTE
Mass in the right lower lobe in the roseanne fissural region measures 6.2 by 10 cm worrisome for malignancy  Pulmonology consulted  Uncertain as his last CT scan in September was negative we will continue to follow

## 2024-04-24 NOTE — HOSPITAL COURSE
Patient is an 83-year-old male with HTN, CAD, diastolic dysfunction, COPD with chronic hypoxic respiratory on 2 L nasal cannula, BPH, chronic back pain, neuropathy, daily alcohol use who presented to ED  with worsening left upper chest pain that was pleuritic in nature with associated generalized weakness and trouble walking over 2-3 days prior to admission. CTA of the chest revealed extensive emphysema, moderate left-sided pleural effusion mild right-sided pleural effusion with right-sided atelectasis versus consolidation, question of a mass in the right lower lobe measuring 6.2 x 10 cm.    Of note, CT chest imaging in September 2023 with marked emphysematous changes and some bronchial wall thickening, no evidence of mass / consolidation / nodules, trace bilateral effusions noted    The pt was admitted on 4/22/24 on IV Rocephin and Azithromycin, Patient's respiratory status worsened. Pulmonary was consulted and placed pt on Vapotherm 25/50, Solu-Medrol IV, and nebulizer. Patient's shortness of breath improved and he was weaned to nasal cannula. Speech therapy recommended soft, bite sized diet, thin liquids, and aspiration precautions.     Pt had significant abdominal distention with minimal stool output-no bowel movement for 10 days prior to admission.  X-rays revealed large amount of stool.  He received multiple cathartics including to enemas. He had several alrge BMs. He then went another 3 days with no BM. Miralax increased to TID along with pericolace and dulcolax. Mag citrate given- pt had large BMs. Patient has continued to be encouraged to participate with physical therapy and be out of bed 2-3 times per day.    Patient's oxygen requirements have continued to improve-currently on 3 liters NC. Pt received 3 days of IV solumedrol and has completed a prednisone taper. Pt received 7 days of IV Rocephin and 5 days of Azithromycin.   Pulmonology was concerned that Left pleural effusion appears slightly larger from  prior imaging and recommended CT chest for further evaluation; may need thoracentesis if larger or appears loculated.   Initially, pt refused repeat CT chest due to SOB when lying flat.   Pulmonology recommended Left thoracentesis per IR which was done on 4/29/24 removing 800 ml pleural fluid which was exudative. Pleural fluid culture showed -no significant isolates/no organisms seen. Pathology pending. SOB improved.   Pt then agreed to CT. Repeat CT chest 4/30/24 showed Moderate to large left pleural effusion and decreased lung consolidation in the right lower lobe and small dependent right pleural effusion compared to 04/22/2024.   Pulmonology recommended repeat thoracentesis. Repeat left thoracentesis was done on 5/1/24 per IR removing 750ml of dark rebecca fluid.   Pulmonology felt RLL lung mass is likely PNA and recommended Augmentin x 2 weeks with close pulmonology f/u. CM consulted for rehab placement- pt was discharged to  Rehab. The patient was seen and examined today and determined to be stable for discharge.

## 2024-04-24 NOTE — PROGRESS NOTES
Dr. Aguilar notified of patient status with shortness of breath.  SaO2 96, Patient to remain on Vapotherm at 25 lpm per md.  Albuterol treatment given.

## 2024-04-24 NOTE — ASSESSMENT & PLAN NOTE
Likely related to pneumonia/pleural effusions and coughing  Initial troponin is normal, we will trend  EKG reviewed, sinus tach with first-degree AV block and right bundle branch block, no concerning ST or T-wave changes  Cardiac monitoring    Questionably related to distended abdomen some improvement

## 2024-04-24 NOTE — ASSESSMENT & PLAN NOTE
CT chest imaging in September 2023 with marked emphysematous changes and some bronchial wall thickening. No evidence of mass / consolidation / nodules on imaging. Trace bilateral effusions noted   Repeat CT chest yesterday reviewed in comparison to prior imaging. Low suspicion for underlying malignancy in light of no evidence of prior mass, lesion, nodule. No mediastinal lymphadenopathy  Sputum culture pending; follow results  Differential diagnosis: decompensated heart failure, acute infection, malignancy, aspiration  Legionella, fungitell and fungal immuno studies pending  May represent aspiration  Continue Rocephin and Azith  Follow cultures and adjust antibiotics as appropriate  Follow chest imaging as needed  Follow fever and WBC trends

## 2024-04-24 NOTE — ASSESSMENT & PLAN NOTE
Patient with chronic hypoxic respiratory failure on home oxygen of 2L/NC.     CT chest imaging in September 2023 with marked emphysematous changes and some bronchial wall thickening. No evidence of mass / consolidation / nodules on imaging. Trace bilateral effusions noted   Repeat CT chest yesterday reviewed in comparison to prior imaging. Low suspicion for underlying malignancy in light of no evidence of prior mass, lesion, nodule. No mediastinal lymphadenopathy  Sputum culture pending; follow results and adjust antibiotics as appropriate  Differential diagnosis: decompensated heart failure, acute infection, malignancy, aspiration  Legionella, fungitell and fungal immuno studies pending  Continue Rocephin and Azith  May ultimately need bronchoscopy  Prior to bronchoscopy; likely needs antibiotic course with repeat imaging to assess for further work-up/evaluation needs  Follow chest imaging as needed  Follow fever and WBC trends  Continue supplemental oxygen as needed; titrate to  maintain sats 90% or greater  Increased work of breathing with associated wheezing this morning  Vapotherm added, currently on 25/0.50 for work of breathing  Add IV Solu-medrol; given 125mg IV once followed by 60mg q6H  CT abdomen with significant constipation  Suspect his elevated diaphragm is contributing to his shortness of breath. Left sided pleuritic pain and abdominal pain improved from yesterday  No acute indication for thoracentesis at this time; will follow imaging for thoracentesis needs  Close pulmonary follow-up upon discharge

## 2024-04-24 NOTE — PT/OT/SLP EVAL
Physical Therapy Evaluation and Treatment    Patient Name: Jason Mayfield III   MRN: [id number]  Recent Surgery: * No surgery found *      Recommendations:     Discharge Recommendations:  (TBD pending further assessment)   Discharge Equipment Recommendations:  (TBD)   Barriers to discharge: None    Assessment:     Jason Mayfield III is a 83 y.o. male admitted with a medical diagnosis of Chronic respiratory failure with hypoxia. He presents with the following impairments/functional limitations: weakness, impaired endurance, impaired functional mobility, gait instability, impaired balance, decreased safety awareness, decreased lower extremity function, impaired cardiopulmonary response to activity.    Rehab Prognosis: Good; patient would benefit from acute PT services to address these deficits and reach maximum level of function.    Plan:     During this hospitalization, patient to be seen 3 x/week to address the above listed problems via gait training, therapeutic activities, therapeutic exercises    Plan of Care Expires: 05/08/24    Subjective     Chief Complaint: difficulty breathing  Patient Comments/Goals: none stated  Pain/Comfort:  Pain Rating 1: 0/10    Social History:  Living Environment: Patient lives with their spouse and son in a single story home with number of outside stair(s): 3 with rail  Prior Level of Function: Prior to admission, patient was independent, driving and retired, and ambulated household and community distances using straight cane. 2L oxygen 24/7.  Equipment Used at Home: cane, straight, oxygen, rollator, grab bar  DME owned (not currently used): none  Assistance Upon Discharge: family    Objective:     Communicated with nurse Rosenthal and epic chart review prior to session. Nurse requested in bed assessment only due to increased SOB. Patient found HOB elevated with peripheral IV, telemetry, PureWick (vapotherm) upon PT entry to room.    General Precautions: Standard, fall, respiratory  "  Orthopedic Precautions: N/A   Braces: N/A    Respiratory Status:  Vapotherm 25L 50%    Exams:  Cognition: Patient is oriented to Person, Place, Time, Situation   MMT ROM    R LE L LE R LE L LE   Hip 4+/5 4+/5 WFL WFL   Knee 5/5 5/5 WFL WFL   Ankle 5/5 5/5 WFL WFL   Sensation:    -       Intact  Skin Integrity/Edema:     -       Skin integrity: Visible skin intact     Functional Mobility:  Gait belt applied - Yes  Bed Mobility  Rolling Left: minimum assistance  Scooting: moderate assistance and of 2 persons  Supine to Sit: hold per nurse  Transfers  Hold per nurse    Therapeutic Activities and Exercises:   Pt educated on role of PT in acute care and POC. Educated on importance of OOB activities, activity pacing, and HEP (marching/hip flex, hip abd, heel slides/LAQ, quad sets, ankle pumps) in order to maintain/regain strength. Encouraged to sit up for all meals. Educated on "call don't fall" policy and increased risk of falling due to weakness, instructed to utilize call bell for assistance with all transfers. Pt agreeable to all requests.    AM-PAC 6 CLICK MOBILITY  Total Score:8    Patient left with bed in chair position with all lines intact, call button in reach, and bed alarm on.    GOALS:   Multidisciplinary Problems       Physical Therapy Goals          Problem: Physical Therapy    Goal Priority Disciplines Outcome Goal Variances Interventions   Physical Therapy Goal     PT, PT/OT      Description: Goals to be met by 5/8/24.  1. Pt will complete bed mobility MOD I.  2. Pt will complete sit to stand MOD I.  3. Pt will ambulate 200ft MOD I using RW.  4. Pt will increase AMPAC score by 2 points to progress functional mobility.                       History:     Past Medical History:   Diagnosis Date    BPH (benign prostatic hyperplasia)     CHF (congestive heart failure)     Chronic back pain     Chronic hypoxic respiratory failure, on home oxygen therapy     COPD (chronic obstructive pulmonary disease)     " Coronary artery disease     Daily consumption of alcohol     Fibroadenoma of breast     History of tobacco abuse     Hypertension     Hypothyroidism, unspecified     Infrarenal abdominal aortic aneurysm (AAA) without rupture     Kidney stones     Neuropathy     NSTEMI (non-ST elevated myocardial infarction)     Personal history of colonic polyps     Shingles (herpes zoster) polyneuropathy        Past Surgical History:   Procedure Laterality Date    ADENOIDECTOMY      APPENDECTOMY      COLONOSCOPY      LUMBAR DISCECTOMY      REPAIR, RETINAL DETACHMENT      SPINAL CORD STIMULATOR IMPLANT      TONSILLECTOMY         Time Tracking:     PT Received On: 04/24/24  PT Start Time: 0953  PT Stop Time: 1016  PT Total Time (min): 23 min     Billable Minutes: Evaluation 15min and Therapeutic Activity 8min    4/24/2024     CCU RN

## 2024-04-24 NOTE — PLAN OF CARE
PT EVAL complete. Required MOD A of 2 for supine scooting. EOB not progressed due to nurse request due to SOB. Recommendation TBD pending further assessment.

## 2024-04-24 NOTE — PT/OT/SLP EVAL
Occupational Therapy   Evaluation    Name: Jason Mayfield III  MRN: [id number]  Admitting Diagnosis: Chronic respiratory failure with hypoxia  Recent Surgery: * No surgery found *      Recommendations:     Discharge Recommendations:  (TBD with pt participation)  Discharge Equipment Recommendations:  other (see comments) (TBD)  Barriers to discharge:  None    Assessment:     Jason Mayfield III is a 83 y.o. male with a medical diagnosis of Chronic respiratory failure with hypoxia.  He presents with the following performance deficits affecting function: impaired endurance, impaired self care skills, impaired functional mobility, gait instability, impaired balance, decreased safety awareness, impaired cardiopulmonary response to activity.      Rehab Prognosis:  TBD ; patient would benefit from acute skilled OT services to address these deficits and reach maximum level of function.       Plan:     Patient to be seen 2 x/week to address the above listed problems via self-care/home management, therapeutic activities, therapeutic exercises  Plan of Care Expires: 05/08/24  Plan of Care Reviewed with: patient    Subjective     Chief Complaint: severe SOB. Pt's nurse requesting in-bed assessment only at this time d/t pt's SOB.   Patient/Family Comments/goals: return to PLOF    Occupational Profile:  Living Environment: lives with wife and son in a 1 story house with 3 steps and railing to enter. Pt has 24/7 support at home.  Previous level of function: Pt on 2L O2 at home. Pt (I) with ADLs and Mod (I) with functional mobility, using rollator in house and SPC in community.  Roles and Routines: drives  Equipment Used at Home: grab bar, oxygen, rollator, cane, straight  Assistance upon Discharge: family    Pain/Comfort:  Pain Rating 1: 0/10    Objective:     Communicated with: Nurse and epic chart review prior to session.  Patient found HOB elevated with telemetry, peripheral IV, PureWick, oxygen, Other (comments) (vapotherm) upon OT  entry to room.    General Precautions: Standard, fall, respiratory  Orthopedic Precautions: N/A  Braces: N/A  Respiratory Status:  VAPOTHERM 25L, 50%    Occupational Performance:    Bed Mobility:    Supine scoot to HOB with Mod A x2.     Activities of Daily Living:  Feeding:  setup A. Drinking from cup    Cognitive/Visual Perceptual:  Cognitive/Psychosocial Skills:     -       Oriented to: Person, Place, Time, and Situation   -       Follows Commands/attention:Follows multistep  commands  -       Communication: clear/fluent  -       Memory: No Deficits noted  -       Safety awareness/insight to disability: impaired     Physical Exam:  Sensation:    -       Intact  Upper Extremity Range of Motion:     -       Right Upper Extremity: WNL  -       Left Upper Extremity: WNL  Upper Extremity Strength:    -       Right Upper Extremity: 4+/5 grossly  -       Left Upper Extremity: 4+/5 grossly   Strength:    -       Right Upper Extremity: WNL  -       Left Upper Extremity: WNL    AMPAC 6 Click ADL:  AMPAC Total Score: 15    Treatment & Education:  Educated pt on pursed lip breathing technique and importance of activity pacing. Patient educated on role of OT in acute setting and benefits of participation.   Educated on importance of OOB activity and calling for A to transfer and meet needs. Encouraged completion of B UE AROM therex throughout the day to tolerance to increase functional strength and activity tolerance. Educated patient on importance of increased tolerance to upright position and direct impact on CV endurance and strength. Patient encouraged to sit up with bed in chair position for a minimum of 2 consecutive hours per day and for all meals. Pt repositioned higher in bed. Patient stated understanding and in agreement with POC.     Patient left HOB elevated with all lines intact and call button in reach    GOALS:   Multidisciplinary Problems       Occupational Therapy Goals          Problem: Occupational  Therapy    Goal Priority Disciplines Outcome Interventions   Occupational Therapy Goal     OT, PT/OT     Description: Goals to be met by: 5/8/24     Patient will increase functional independence with ADLs by performing:    UE Dressing with Modified [place].  Grooming while standing at sink with Modified [place].  Toileting from toilet with Modified [place] for hygiene and clothing management.   Toilet transfer to toilet with Contact Guard Assistance.  Upper extremity exercise program x15 reps per handout, with independence.                         History:     Past Medical History:   Diagnosis Date    BPH (benign prostatic hyperplasia)     CHF (congestive heart failure)     Chronic back pain     Chronic hypoxic respiratory failure, on home oxygen therapy     COPD (chronic obstructive pulmonary disease)     Coronary artery disease     Daily consumption of alcohol     Fibroadenoma of breast     History of tobacco abuse     Hypertension     Hypothyroidism, unspecified     Infrarenal abdominal aortic aneurysm (AAA) without rupture     Kidney stones     Neuropathy     NSTEMI (non-ST elevated myocardial infarction)     Personal history of colonic polyps     Shingles (herpes zoster) polyneuropathy          Past Surgical History:   Procedure Laterality Date    ADENOIDECTOMY      APPENDECTOMY      COLONOSCOPY      LUMBAR DISCECTOMY      REPAIR, RETINAL DETACHMENT      SPINAL CORD STIMULATOR IMPLANT      TONSILLECTOMY         Time Tracking:     OT Date of Treatment: 04/24/24  OT Start Time: 1005  OT Stop Time: 1030  OT Total Time (min): 25 min    Billable Minutes:Evaluation 15  Therapeutic Activity 10    4/24/2024  Celia Meng OT

## 2024-04-24 NOTE — SUBJECTIVE & OBJECTIVE
Interval History:  Patient seen and examined with wife at bedside.  Discussed with Pulmonary.  Patient had significant shortness of breath this morning when attempting to reposition himself in bed.  He was transitioned from nasal cannula to Vapotherm with improvement in his symptoms.  He has still not had significant bowel movement.    Review of Systems   Constitutional:  Positive for activity change, appetite change and fatigue.   Respiratory:  Positive for chest tightness, shortness of breath and wheezing.    Cardiovascular:  Negative for chest pain, palpitations and leg swelling.   Gastrointestinal:  Positive for abdominal pain and constipation.   Musculoskeletal:  Positive for arthralgias.   Neurological:  Positive for weakness.   All other systems reviewed and are negative.    Objective:     Vital Signs (Most Recent):  Temp: 98 °F (36.7 °C) (04/24/24 1212)  Pulse: 110 (04/24/24 1646)  Resp: (!) 24 (04/24/24 1646)  BP: (!) 168/76 (04/24/24 1212)  SpO2: (!) 94 % (04/24/24 1646) Vital Signs (24h Range):  Temp:  [97.6 °F (36.4 °C)-98.2 °F (36.8 °C)] 98 °F (36.7 °C)  Pulse:  [] 110  Resp:  [17-26] 24  SpO2:  [84 %-95 %] 94 %  BP: (138-168)/(76-82) 168/76     Weight: 80.6 kg (177 lb 11.1 oz)  Body mass index is 24.78 kg/m².    Intake/Output Summary (Last 24 hours) at 4/24/2024 1701  Last data filed at 4/23/2024 1735  Gross per 24 hour   Intake --   Output 450 ml   Net -450 ml         Physical Exam  Vitals reviewed.   Constitutional:       Appearance: He is ill-appearing.   HENT:      Head: Normocephalic and atraumatic.      Mouth/Throat:      Mouth: Mucous membranes are moist.      Pharynx: Oropharynx is clear.   Eyes:      Extraocular Movements: Extraocular movements intact.      Conjunctiva/sclera: Conjunctivae normal.   Cardiovascular:      Rate and Rhythm: Regular rhythm. Tachycardia present.      Pulses: Normal pulses.      Heart sounds: Normal heart sounds.   Pulmonary:      Effort: Respiratory distress  present.      Breath sounds: Wheezing present.   Abdominal:      General: Bowel sounds are normal. There is distension.      Palpations: Abdomen is soft.      Tenderness: There is no abdominal tenderness. There is no guarding or rebound.   Musculoskeletal:         General: Normal range of motion.      Cervical back: Normal range of motion and neck supple.   Skin:     General: Skin is warm and dry.   Neurological:      Mental Status: He is alert and oriented to person, place, and time. Mental status is at baseline.      Motor: Weakness present.   Psychiatric:         Mood and Affect: Mood normal.         Behavior: Behavior normal.         Thought Content: Thought content normal.             Significant Labs: All pertinent labs within the past 24 hours have been reviewed.  CBC:   Recent Labs   Lab 04/23/24  0529 04/24/24  0558   WBC 15.38* 18.89*   HGB 10.4* 10.7*   HCT 30.8* 31.4*    344     CMP:   Recent Labs   Lab 04/23/24  0529 04/24/24  0558   * 128*   K 4.4 5.3*   CL 94* 93*   CO2 24 28   * 112*   BUN 19 20   CREATININE 0.8 0.6   CALCIUM 9.1 8.6*   ANIONGAP 11 7*       Respiratory Culture:   Recent Labs   Lab 04/23/24  0026 04/23/24  1407   GSRESP >10epis/lfp and <than many WBC's  Predominance of oropharyngeal brian. Please recollect. <10 epithelial cells per low power field.  Rare WBC's  No organisms seen   RESPIRATORYC Specimen inadequate - culture not performed  --        Significant Imaging: I have reviewed all pertinent imaging results/findings within the past 24 hours.

## 2024-04-24 NOTE — ASSESSMENT & PLAN NOTE
Chronic, controlled. Latest blood pressure and vitals reviewed-     Temp:  [97.6 °F (36.4 °C)-98.2 °F (36.8 °C)]   Pulse:  []   Resp:  [17-26]   BP: (138-168)/(76-82)   SpO2:  [84 %-95 %] .   Home meds for hypertension were reviewed and noted below.   Hypertension Medications               furosemide (LASIX) 20 MG tablet Take 20 mg by mouth.    hydrALAZINE (APRESOLINE) 50 MG tablet Take 50 mg by mouth 2 (two) times daily.            While in the hospital, will manage blood pressure as follows; trend    Will utilize p.r.n. blood pressure medication only if patient's blood pressure greater than 180/110 and he develops symptoms such as worsening chest pain or shortness of breath.

## 2024-04-24 NOTE — PLAN OF CARE
Problem: Adult Inpatient Plan of Care  Goal: Plan of Care Review  Outcome: Progressing  Goal: Patient-Specific Goal (Individualized)  Outcome: Progressing  Goal: Absence of Hospital-Acquired Illness or Injury  Outcome: Progressing  Goal: Optimal Comfort and Wellbeing  Outcome: Progressing  Goal: Readiness for Transition of Care  Outcome: Progressing     Problem: Infection  Goal: Absence of Infection Signs and Symptoms  Outcome: Progressing     Problem: Fluid Imbalance (Pneumonia)  Goal: Fluid Balance  Outcome: Progressing     Problem: Infection (Pneumonia)  Goal: Resolution of Infection Signs and Symptoms  Outcome: Progressing     Problem: Respiratory Compromise (Pneumonia)  Goal: Effective Oxygenation and Ventilation  Outcome: Progressing     Problem: Fall Injury Risk  Goal: Absence of Fall and Fall-Related Injury  Outcome: Progressing     Problem: Skin Injury Risk Increased  Goal: Skin Health and Integrity  Outcome: Progressing

## 2024-04-24 NOTE — ASSESSMENT & PLAN NOTE
Patient's anemia is currently controlled. Has not received any PRBCs to date. Etiology likely d/t  unknown, work up in progress  Current CBC reviewed-   Lab Results   Component Value Date    HGB 10.7 (L) 04/24/2024    HCT 31.4 (L) 04/24/2024     Monitor serial CBC and transfuse if patient becomes hemodynamically unstable, symptomatic or H/H drops below 7/21.    This is new finding, had normal H&H 6 months ago  Iron-deficiency, CEA negative  Check stool for occult blood

## 2024-04-24 NOTE — ASSESSMENT & PLAN NOTE
Patient with Hypoxic Respiratory failure which is Acute on chronic.  he is on home oxygen at 2 LPM. Supplemental oxygen was provided and noted- Oxygen Concentration (%):  [50] 50    .   Signs/symptoms of respiratory failure include- tachypnea, increased work of breathing, use of accessory muscles, and wheezing. Contributing diagnoses includes - COPD, Pleural effusion, Pneumonia, and lung mass  Labs and images were reviewed. Patient Has not had a recent ABG. Will treat underlying causes and adjust management of respiratory failure as follows-     Was given Solu-medrol x one dose per ED, with increased shortness breath and wheezing Solu-Medrol as we will do every 6 hours in addition to nebs.  Supplemental oxygen has been increased to Vapotherm at 25/50.      Continue supplemental oxygen.  We will treat with antibiotics, nebs, pulmonary hygiene

## 2024-04-25 PROBLEM — R14.0 ABDOMINAL DISTENSION: Status: ACTIVE | Noted: 2024-04-25

## 2024-04-25 LAB
ALLENS TEST: ABNORMAL
ANION GAP SERPL CALC-SCNC: 4 MMOL/L (ref 8–16)
BACTERIA SPEC AEROBE CULT: ABNORMAL
BACTERIA SPEC AEROBE CULT: ABNORMAL
BASOPHILS # BLD AUTO: 0.05 K/UL (ref 0–0.2)
BASOPHILS NFR BLD: 0.2 % (ref 0–1.9)
BNP SERPL-MCNC: 270 PG/ML (ref 0–99)
BUN SERPL-MCNC: 21 MG/DL (ref 8–23)
CALCIUM SERPL-MCNC: 9 MG/DL (ref 8.7–10.5)
CHLORIDE SERPL-SCNC: 94 MMOL/L (ref 95–110)
CO2 SERPL-SCNC: 31 MMOL/L (ref 23–29)
CREAT SERPL-MCNC: 0.7 MG/DL (ref 0.5–1.4)
DELSYS: ABNORMAL
DIFFERENTIAL METHOD BLD: ABNORMAL
EOSINOPHIL # BLD AUTO: 0.1 K/UL (ref 0–0.5)
EOSINOPHIL NFR BLD: 0.4 % (ref 0–8)
ERYTHROCYTE [DISTWIDTH] IN BLOOD BY AUTOMATED COUNT: 12.5 % (ref 11.5–14.5)
EST. GFR  (NO RACE VARIABLE): >60 ML/MIN/1.73 M^2
FIO2: 50
FLOW: 30
GLUCOSE SERPL-MCNC: 183 MG/DL (ref 70–110)
GRAM STN SPEC: ABNORMAL
HCO3 UR-SCNC: 29.3 MMOL/L (ref 24–28)
HCT VFR BLD AUTO: 29.6 % (ref 40–54)
HGB BLD-MCNC: 10.1 G/DL (ref 14–18)
IMM GRANULOCYTES # BLD AUTO: 0.36 K/UL (ref 0–0.04)
IMM GRANULOCYTES NFR BLD AUTO: 1.7 % (ref 0–0.5)
LACTATE SERPL-SCNC: 1.6 MMOL/L (ref 0.5–2.2)
LYMPHOCYTES # BLD AUTO: 1 K/UL (ref 1–4.8)
LYMPHOCYTES NFR BLD: 4.8 % (ref 18–48)
MAGNESIUM SERPL-MCNC: 3.8 MG/DL (ref 1.6–2.6)
MCH RBC QN AUTO: 31.9 PG (ref 27–31)
MCHC RBC AUTO-ENTMCNC: 34.1 G/DL (ref 32–36)
MCV RBC AUTO: 93 FL (ref 82–98)
MODE: ABNORMAL
MONOCYTES # BLD AUTO: 1.2 K/UL (ref 0.3–1)
MONOCYTES NFR BLD: 5.5 % (ref 4–15)
NEUTROPHILS # BLD AUTO: 18.2 K/UL (ref 1.8–7.7)
NEUTROPHILS NFR BLD: 87.4 % (ref 38–73)
NRBC BLD-RTO: 0 /100 WBC
PCO2 BLDA: 53.7 MMHG (ref 35–45)
PH SMN: 7.34 [PH] (ref 7.35–7.45)
PLATELET # BLD AUTO: 356 K/UL (ref 150–450)
PMV BLD AUTO: 9.2 FL (ref 9.2–12.9)
PO2 BLDA: 84 MMHG (ref 80–100)
POC BE: 4 MMOL/L
POC SATURATED O2: 95 % (ref 95–100)
POTASSIUM SERPL-SCNC: 5.2 MMOL/L (ref 3.5–5.1)
RBC # BLD AUTO: 3.17 M/UL (ref 4.6–6.2)
SAMPLE: ABNORMAL
SITE: ABNORMAL
SODIUM SERPL-SCNC: 129 MMOL/L (ref 136–145)
WBC # BLD AUTO: 20.89 K/UL (ref 3.9–12.7)

## 2024-04-25 PROCEDURE — 51700 IRRIGATION OF BLADDER: CPT

## 2024-04-25 PROCEDURE — 25000003 PHARM REV CODE 250: Performed by: NURSE PRACTITIONER

## 2024-04-25 PROCEDURE — 36415 COLL VENOUS BLD VENIPUNCTURE: CPT | Performed by: NURSE PRACTITIONER

## 2024-04-25 PROCEDURE — 27000249 HC VAPOTHERM CIRCUIT

## 2024-04-25 PROCEDURE — 36600 WITHDRAWAL OF ARTERIAL BLOOD: CPT

## 2024-04-25 PROCEDURE — 83880 ASSAY OF NATRIURETIC PEPTIDE: CPT | Performed by: INTERNAL MEDICINE

## 2024-04-25 PROCEDURE — 25000003 PHARM REV CODE 250: Performed by: INTERNAL MEDICINE

## 2024-04-25 PROCEDURE — 63600175 PHARM REV CODE 636 W HCPCS: Performed by: NURSE PRACTITIONER

## 2024-04-25 PROCEDURE — 11000001 HC ACUTE MED/SURG PRIVATE ROOM

## 2024-04-25 PROCEDURE — 27100171 HC OXYGEN HIGH FLOW UP TO 24 HOURS

## 2024-04-25 PROCEDURE — 94761 N-INVAS EAR/PLS OXIMETRY MLT: CPT | Mod: XB

## 2024-04-25 PROCEDURE — 94640 AIRWAY INHALATION TREATMENT: CPT

## 2024-04-25 PROCEDURE — 94799 UNLISTED PULMONARY SVC/PX: CPT

## 2024-04-25 PROCEDURE — 99900035 HC TECH TIME PER 15 MIN (STAT)

## 2024-04-25 PROCEDURE — 25000003 PHARM REV CODE 250

## 2024-04-25 PROCEDURE — 83605 ASSAY OF LACTIC ACID: CPT | Performed by: INTERNAL MEDICINE

## 2024-04-25 PROCEDURE — 83735 ASSAY OF MAGNESIUM: CPT | Performed by: NURSE PRACTITIONER

## 2024-04-25 PROCEDURE — 25000242 PHARM REV CODE 250 ALT 637 W/ HCPCS: Performed by: INTERNAL MEDICINE

## 2024-04-25 PROCEDURE — 99222 1ST HOSP IP/OBS MODERATE 55: CPT | Mod: ,,, | Performed by: INTERNAL MEDICINE

## 2024-04-25 PROCEDURE — 80048 BASIC METABOLIC PNL TOTAL CA: CPT | Performed by: NURSE PRACTITIONER

## 2024-04-25 PROCEDURE — 51798 US URINE CAPACITY MEASURE: CPT

## 2024-04-25 PROCEDURE — 82803 BLOOD GASES ANY COMBINATION: CPT

## 2024-04-25 PROCEDURE — 36415 COLL VENOUS BLD VENIPUNCTURE: CPT | Performed by: INTERNAL MEDICINE

## 2024-04-25 PROCEDURE — 85025 COMPLETE CBC W/AUTO DIFF WBC: CPT | Performed by: NURSE PRACTITIONER

## 2024-04-25 PROCEDURE — 63600175 PHARM REV CODE 636 W HCPCS: Mod: JZ,JG | Performed by: INTERNAL MEDICINE

## 2024-04-25 PROCEDURE — 63600175 PHARM REV CODE 636 W HCPCS

## 2024-04-25 PROCEDURE — 27100092 HC HIGH FLOW DELIVERY CANNULA

## 2024-04-25 RX ORDER — LACTULOSE 10 G/15ML
20 SOLUTION ORAL 3 TIMES DAILY
Status: DISCONTINUED | OUTPATIENT
Start: 2024-04-25 | End: 2024-04-27

## 2024-04-25 RX ORDER — LACTULOSE 10 G/15ML
200 SOLUTION ORAL; RECTAL ONCE
Status: COMPLETED | OUTPATIENT
Start: 2024-04-25 | End: 2024-04-25

## 2024-04-25 RX ORDER — OLANZAPINE 2.5 MG/1
2.5 TABLET ORAL NIGHTLY PRN
Status: DISCONTINUED | OUTPATIENT
Start: 2024-04-25 | End: 2024-05-02 | Stop reason: HOSPADM

## 2024-04-25 RX ORDER — LORAZEPAM 2 MG/ML
0.5 INJECTION INTRAMUSCULAR ONCE
Status: COMPLETED | OUTPATIENT
Start: 2024-04-25 | End: 2024-04-25

## 2024-04-25 RX ADMIN — LORAZEPAM 0.5 MG: 2 INJECTION INTRAMUSCULAR; INTRAVENOUS at 12:04

## 2024-04-25 RX ADMIN — ALBUTEROL SULFATE 2.5 MG: 2.5 SOLUTION RESPIRATORY (INHALATION) at 07:04

## 2024-04-25 RX ADMIN — BUDESONIDE INHALATION 0.5 MG: 0.5 SUSPENSION RESPIRATORY (INHALATION) at 07:04

## 2024-04-25 RX ADMIN — CEFTRIAXONE 2 G: 2 INJECTION, POWDER, FOR SOLUTION INTRAMUSCULAR; INTRAVENOUS at 05:04

## 2024-04-25 RX ADMIN — GABAPENTIN 1200 MG: 400 CAPSULE ORAL at 10:04

## 2024-04-25 RX ADMIN — ATORVASTATIN CALCIUM 40 MG: 40 TABLET, FILM COATED ORAL at 08:04

## 2024-04-25 RX ADMIN — MORPHINE SULFATE 2 MG: 2 INJECTION, SOLUTION INTRAMUSCULAR; INTRAVENOUS at 10:04

## 2024-04-25 RX ADMIN — METHYLPREDNISOLONE SODIUM SUCCINATE 60 MG: 125 INJECTION, POWDER, FOR SOLUTION INTRAMUSCULAR; INTRAVENOUS at 05:04

## 2024-04-25 RX ADMIN — ALBUTEROL SULFATE 2.5 MG: 2.5 SOLUTION RESPIRATORY (INHALATION) at 04:04

## 2024-04-25 RX ADMIN — METHYLPREDNISOLONE SODIUM SUCCINATE 60 MG: 125 INJECTION, POWDER, FOR SOLUTION INTRAMUSCULAR; INTRAVENOUS at 06:04

## 2024-04-25 RX ADMIN — LACTULOSE 20 G: 20 SOLUTION ORAL at 04:04

## 2024-04-25 RX ADMIN — ALBUTEROL SULFATE 2.5 MG: 2.5 SOLUTION RESPIRATORY (INHALATION) at 12:04

## 2024-04-25 RX ADMIN — MELATONIN TAB 3 MG 6 MG: 3 TAB at 08:04

## 2024-04-25 RX ADMIN — GABAPENTIN 1200 MG: 400 CAPSULE ORAL at 03:04

## 2024-04-25 RX ADMIN — HYDRALAZINE HYDROCHLORIDE 50 MG: 25 TABLET, FILM COATED ORAL at 08:04

## 2024-04-25 RX ADMIN — LACTULOSE 200 G: 10 SOLUTION ORAL at 12:04

## 2024-04-25 RX ADMIN — POLYETHYLENE GLYCOL 3350 17 G: 17 POWDER, FOR SOLUTION ORAL at 10:04

## 2024-04-25 RX ADMIN — AZITHROMYCIN MONOHYDRATE 500 MG: 500 INJECTION, POWDER, LYOPHILIZED, FOR SOLUTION INTRAVENOUS at 12:04

## 2024-04-25 RX ADMIN — METHYLPREDNISOLONE SODIUM SUCCINATE 60 MG: 125 INJECTION, POWDER, FOR SOLUTION INTRAMUSCULAR; INTRAVENOUS at 12:04

## 2024-04-25 RX ADMIN — ALBUTEROL SULFATE 2.5 MG: 2.5 SOLUTION RESPIRATORY (INHALATION) at 03:04

## 2024-04-25 RX ADMIN — GABAPENTIN 1200 MG: 400 CAPSULE ORAL at 08:04

## 2024-04-25 RX ADMIN — HYDRALAZINE HYDROCHLORIDE 50 MG: 25 TABLET, FILM COATED ORAL at 10:04

## 2024-04-25 RX ADMIN — POLYETHYLENE GLYCOL 3350 17 G: 17 POWDER, FOR SOLUTION ORAL at 08:04

## 2024-04-25 RX ADMIN — OLANZAPINE 2.5 MG: 2.5 TABLET, FILM COATED ORAL at 10:04

## 2024-04-25 RX ADMIN — SENNOSIDES AND DOCUSATE SODIUM 2 TABLET: 50; 8.6 TABLET ORAL at 08:04

## 2024-04-25 RX ADMIN — FINASTERIDE 5 MG: 5 TABLET, FILM COATED ORAL at 10:04

## 2024-04-25 RX ADMIN — LACTULOSE 20 G: 20 SOLUTION ORAL at 08:04

## 2024-04-25 NOTE — ASSESSMENT & PLAN NOTE
Patient is identified as having Diastolic (HFpEF) heart failure that is Chronic. CHF is currently controlled. Latest ECHO performed and demonstrates- No results found for this or any previous visit.  Monitor clinical status closely. Monitor on telemetry. Patient is off CHF pathway.  Monitor strict Is&Os and daily weights.  Fluid restriction not indicated at this time. Cardiology has not been consulted. Continue to stress to patient importance of self efficacy and  on diet for CHF. Last BNP reviewed- and noted below   Recent Labs   Lab 04/25/24  0026   *       Holding home Lasix 20 mg daily at this time as patient appears dry on exam

## 2024-04-25 NOTE — ASSESSMENT & PLAN NOTE
CT chest imaging in September 2023 with marked emphysematous changes and some bronchial wall thickening. No evidence of mass / consolidation / nodules on imaging. Trace bilateral effusions noted   Repeat CT chest yesterday reviewed in comparison to prior imaging. Low suspicion for underlying malignancy in light of no evidence of prior mass, lesion, nodule. No mediastinal lymphadenopathy  Sputum culture NGTD; follow   Differential diagnosis: decompensated heart failure, acute infection, malignancy, aspiration  Legionella, fungitell and fungal immuno studies pending  Continue Rocephin and Azith for now  Follow cultures and adjust antibiotics as appropriate  Follow chest imaging as needed  Follow fever and WBC trends

## 2024-04-25 NOTE — ASSESSMENT & PLAN NOTE
Patient had significant bowel movement this morning.  He has had multiple laxatives and we will continue.  GI consult

## 2024-04-25 NOTE — CARE UPDATE
Requested to evaluate patient by Dr. Burch after reports of increased respiratory distress. See significant event note.     Patient noted to be extremely anxious, tachypneic, mildly tachycardic. RT attempted to give scheduled neb tx, but patient had an episode of vomiting. Scheduled enema had to be delayed due to anxiety, respiratory distress. He is currently being treated for COPD exacerbation in addition to severe constipation with significant abdominal distention. Labs, imaging reviewed.     No rales or crackles, peripheral edema appreciated on exam. Stat labs, imaging, ABG, and bladder scan ordered. Patient had approximately 250cc urine on bladder scan, which was decompressed with straight cath. Vapo-therm was increased from 20L to 30L briefly, but is being weaned back down. Albuterol treatment given.     Suspect component of anxiety may be early alcohol withdrawals. Ativan administered with significant improvement in anxiety, work of breathing, and respiratory distress. May benefit from CIWA monitoring and librium. He was able to tolerate positioning for enema, which has started to produce small pieces of stool.     Abdominal imaging with no free air in abdomen, stool burden still noted. CXR shows improvement of RLL mass, continued left pleural effusion. Labs reviewed. , lactic acid 1.6. ABG 7.345/53.7/84/29.3/95%.     Discussed with Dr. Burch. Patient is stable to remain on floor at this time.     Ariadna Trejo, [facility]-  Critical Care Medicine  [facility]

## 2024-04-25 NOTE — SUBJECTIVE & OBJECTIVE
Past Medical History:   Diagnosis Date    BPH (benign prostatic hyperplasia)     CHF (congestive heart failure)     Chronic back pain     Chronic hypoxic respiratory failure, on home oxygen therapy     COPD (chronic obstructive pulmonary disease)     Coronary artery disease     Daily consumption of alcohol     Fibroadenoma of breast     History of tobacco abuse     Hypertension     Hypothyroidism, unspecified     Infrarenal abdominal aortic aneurysm (AAA) without rupture     Kidney stones     Neuropathy     NSTEMI (non-ST elevated myocardial infarction)     Personal history of colonic polyps     Shingles (herpes zoster) polyneuropathy        Past Surgical History:   Procedure Laterality Date    ADENOIDECTOMY      APPENDECTOMY      COLONOSCOPY      LUMBAR DISCECTOMY      REPAIR, RETINAL DETACHMENT      SPINAL CORD STIMULATOR IMPLANT      TONSILLECTOMY         Review of patient's allergies indicates:   Allergen Reactions    Sulfamethoxazole-trimethoprim Itching     Family History       Problem Relation (Age of Onset)    Colon cancer Maternal Grandfather    Coronary artery disease Sister    Emphysema Father    No Known Problems Mother          Tobacco Use    Smoking status: Former     Types: Cigarettes     Start date: 1/15/2024     Quit date: 1964     Years since quittin.0    Smokeless tobacco: Not on file   Substance and Sexual Activity    Alcohol use: Yes     Comment: 2 drinks hard liquor per day    Drug use: Not Currently    Sexual activity: Not on file     Review of Systems   Reason unable to perform ROS: review of systems limited by lethargy.   Respiratory:  Positive for shortness of breath (stable right now).    Gastrointestinal:  Negative for blood in stool.     Objective:     Vital Signs (Most Recent):  Temp: 98.3 °F (36.8 °C) (24 0804)  Pulse: 80 (24 0810)  Resp: 20 (24 0804)  BP: (!) 162/70 (24 0804)  SpO2: 97 % (24 0804) Vital Signs (24h Range):  Temp:  [97.5 °F (36.4  "°C)-98.3 °F (36.8 °C)] 98.3 °F (36.8 °C)  Pulse:  [] 80  Resp:  [16-26] 20  SpO2:  [93 %-97 %] 97 %  BP: (146-201)/(70-91) 162/70     Weight: 82.8 kg (182 lb 8.7 oz) (04/25/24 0535)  Body mass index is 25.46 kg/m².      Intake/Output Summary (Last 24 hours) at 4/25/2024 1046  Last data filed at 4/25/2024 0345  Gross per 24 hour   Intake 150 ml   Output 1000 ml   Net -850 ml       Lines/Drains/Airways       Peripheral Intravenous Line  Duration                  Peripheral IV - Single Lumen 04/23/24 0102 22 G Posterior;Right Forearm 2 days                     Physical Exam  Constitutional:       Appearance: He is well-developed. He is ill-appearing.   HENT:      Head: Normocephalic and atraumatic.   Eyes:      Extraocular Movements: Extraocular movements intact.   Cardiovascular:      Rate and Rhythm: Normal rate and regular rhythm.      Heart sounds: Normal heart sounds. No murmur heard.  Pulmonary:      Effort: Pulmonary effort is normal. No respiratory distress.   Abdominal:      General: Bowel sounds are decreased. There is distension.      Palpations: Abdomen is soft. There is no mass.      Tenderness: There is no abdominal tenderness.   Musculoskeletal:      Right lower leg: No edema.      Left lower leg: No edema.   Skin:     General: Skin is warm and dry.   Neurological:      Mental Status: He is lethargic.      Cranial Nerves: No cranial nerve deficit.   Psychiatric:         Behavior: Behavior normal.          Significant Labs:  CBC:   Recent Labs   Lab 04/24/24  0558 04/25/24  0548   WBC 18.89* 20.89*   HGB 10.7* 10.1*   HCT 31.4* 29.6*    356     CMP:   Recent Labs   Lab 04/25/24  0548   *   CALCIUM 9.0   *   K 5.2*   CO2 31*   CL 94*   BUN 21   CREATININE 0.7     Coagulation: No results for input(s): "PT", "INR", "APTT" in the last 48 hours.    Significant Imaging:  Imaging results within the past 24 hours have been reviewed.  "

## 2024-04-25 NOTE — HPI
"The patient presented to the ER for worsening chest pain which had been intermittent for a few weeks. He is on 2L NC at baseline. Chest imaging showed extensive emphysema, bilateral pleural effusions, PNA and new right lower lobe mass. He was started on antibiotics and Pulmonary consulted. Yesterday, he was put on vapotherm and last night had a "significant event" of resp distress. He is more stable this morning. We have been consulted for severe constipation with abdominal distention. AXR and CT scan have shown significant stool burden without obstruction. The patient reports a history of chronic constipation at home. He doesn't take anything for it. He thinks his last BM was two weeks before admit. His son says the abdomen has gotten progressively distended over the last couple of days. The patient denies nausea or vomiting. Here he has been given two brown bomb enemas, two mag citrates, senna-docusate yesterday, oral lactulose and miralax yesterday and lactulose enema this morning. Within the half hour, the patient had a large non-bloody, loose/watery stool. His son reports some decrease in abd distention now.     "

## 2024-04-25 NOTE — ASSESSMENT & PLAN NOTE
Chronic, controlled. Latest blood pressure and vitals reviewed-     Temp:  [97.5 °F (36.4 °C)-98.5 °F (36.9 °C)]   Pulse:  []   Resp:  [16-26]   BP: (126-201)/(70-93)   SpO2:  [93 %-99 %] .   Home meds for hypertension were reviewed and noted below.   Hypertension Medications               furosemide (LASIX) 20 MG tablet Take 20 mg by mouth.    hydrALAZINE (APRESOLINE) 50 MG tablet Take 50 mg by mouth 2 (two) times daily.            While in the hospital, will manage blood pressure as follows; trend    Will utilize p.r.n. blood pressure medication only if patient's blood pressure greater than 180/110 and he develops symptoms such as worsening chest pain or shortness of breath.

## 2024-04-25 NOTE — ASSESSMENT & PLAN NOTE
Patient has hyponatremia which is controlled,We will aim to correct the sodium by 4-6mEq in 24 hours. We will monitor sodium Daily. The hyponatremia is due to Dehydration/hypovolemia. We will treat the hyponatremia with IV fluids as follows: NS at 75 ml/hr. The patient's sodium results have been reviewed and are listed below.  Recent Labs   Lab 04/25/24  0548   *     We will continue to trend

## 2024-04-25 NOTE — PROGRESS NOTES
[facility] - Med S[facility]  [facility] Medicine  Progress Note    Patient Name: Jason Mayfield III  MRN: [id number]  Patient Class: IP- Inpatient   Admission Date: 4/22/2024  Length of Stay: 3 days  Attending Physician: Gisella Aguilar MD  Primary Care Provider: TANNER Mane MD        Subjective:     Principal Problem:Chronic respiratory failure with hypoxia        HPI:  Patient is 83-year-old male with past medical history significant for hypertension, NSTEMI, diastolic congestive heart failure, COPD, chronic hypoxic respiratory failure on home O2 @ 2L/min NC, BPH,  fibroadenoma of breast, hypothyroidism, infrarenal AAA(3.4 cm),  chronic back pain, neuropathy, shingles infection, kidney stones, colonic polyps, daily alcohol use, and former tobacco abuse who presented to ED with complaints of chest pain and dyspnea.  He reports  that for the past 2-3 days he has been having left upper chest pain, moderate, worsened with deep breathing, chronic cough, with no radiation into neck, jaw, or arms.  He also reports some generalized weakness and trouble walking over the past 2-3 days as well.  He reports some wheezing although states that this is his baseline. He denies fever, chills , abdominal pain, nausea, vomiting, diarrhea, leg pain, dysuria, or worsening edema. he states that he is normally on 2 L per minute nasal cannula, however over the past couple of days he has turned it up to 3-4 liters/minute.  He is followed by Dr. Gant.  Vital signs on arrival to ED-[facility]  with 98.3 temp, heart rate 107, respiratory rate 25, blood pressure 135/62, 91% SpO2 on 4 liters/minute nasal cannula.  He has remained afebrile while in ED. oxygen has been weaned to 3 liters/minute nasal cannula with SpO2 96%.  Lab workup reveals WBC 17.33, hemoglobin 11.5, hematocrit 34.5, sodium 131, potassium 4.7, chloride 93, CO2 24, BUN 19, creatinine 0.8, glucose 129, normal LFTs, BNP 85, troponin 0.010, lactic acid 1.0, procalcitonin 0.09,   and normal urinalysis.  EKG:  Sinus tach with first-degree AV block and right bundle-branch block, heart rate 102.  Chest x-ray notes thoracic spinal cord stimulator leads, normal heart size, mild aortic atherosclerosis, mild volume loss with vascular crowding in both lung bases.  CTA of chest shows extensive emphysema, moderate left-sided pleural effusion, mild right-sided pleural effusion, moderate right basilar atelectasis versus consolidation and mild left basilar atelectasis versus consolidation, suggestion of mass in the right lower lobe perifissural region measuring 6.2 x 10 cm, worrisome for malignancy.  He was given Rocephin, DuoNeb, Solu-Medrol, gabapentin, and tramadol while in ED. Hospital Medicine was consulted for admission due to worsening hypoxic respiratory failure, pneumonia, and newly discovered lung mass.    Overview/Hospital Course:    Patient was attempting to eat breakfast and reposition himself in bed with increased work of breathing and wheezing on exam.  Pulmonary evaluated the patient and he was started on Vapotherm 25/50 Solu-Medrol IV and nebulizer.  Patient's shortness of breath improved.  He still has significant abdominal distention with minimal stool output.    Patient had additional bowel movement this morning.  After his anxiety last evening getting Ativan he is drowsy.  Respiratory status is stable.    Interval History:  Patient seen and examined in room.  Discussed with Pulmonary and GI.  Patient had large bowel movement this morning.  Still has distended abdomen.  GI we will continue to follow.    Review of Systems   Constitutional:  Positive for activity change, appetite change and fatigue.   Respiratory:  Positive for shortness of breath and wheezing. Negative for chest tightness.    Cardiovascular:  Negative for chest pain, palpitations and leg swelling.   Gastrointestinal:  Positive for abdominal pain and constipation.   Musculoskeletal:  Positive for arthralgias.    Neurological:  Positive for weakness.   All other systems reviewed and are negative.    Objective:     Vital Signs (Most Recent):  Temp: 98.5 °F (36.9 °C) (04/25/24 1220)  Pulse: 91 (04/25/24 1232)  Resp: 18 (04/25/24 1232)  BP: (!) 126/93 (04/25/24 1220)  SpO2: 98 % (04/25/24 1232) Vital Signs (24h Range):  Temp:  [97.5 °F (36.4 °C)-98.5 °F (36.9 °C)] 98.5 °F (36.9 °C)  Pulse:  [] 91  Resp:  [16-26] 18  SpO2:  [93 %-99 %] 98 %  BP: (126-201)/(70-93) 126/93     Weight: 82.8 kg (182 lb 8.7 oz)  Body mass index is 25.46 kg/m².    Intake/Output Summary (Last 24 hours) at 4/25/2024 1441  Last data filed at 4/25/2024 1130  Gross per 24 hour   Intake 150 ml   Output 1450 ml   Net -1300 ml         Physical Exam  Vitals reviewed.   Constitutional:       Appearance: He is ill-appearing.   HENT:      Head: Normocephalic and atraumatic.      Mouth/Throat:      Mouth: Mucous membranes are moist.      Pharynx: Oropharynx is clear.   Eyes:      Extraocular Movements: Extraocular movements intact.      Conjunctiva/sclera: Conjunctivae normal.   Cardiovascular:      Rate and Rhythm: Regular rhythm. Tachycardia present.      Pulses: Normal pulses.      Heart sounds: Normal heart sounds.   Pulmonary:      Effort: Respiratory distress present.      Breath sounds: Rhonchi present.   Abdominal:      General: Bowel sounds are normal. There is distension.      Tenderness: There is no abdominal tenderness. There is no guarding or rebound.   Musculoskeletal:         General: Normal range of motion.      Cervical back: Normal range of motion and neck supple.   Skin:     General: Skin is warm and dry.   Neurological:      Mental Status: He is alert and oriented to person, place, and time. Mental status is at baseline.      Motor: Weakness present.   Psychiatric:         Mood and Affect: Mood normal.         Behavior: Behavior normal.         Thought Content: Thought content normal.             Significant Labs: All pertinent labs  "within the past 24 hours have been reviewed.  Blood Culture: No results for input(s): "LABBLOO" in the last 48 hours.  CBC:   Recent Labs   Lab 04/24/24  0558 04/25/24  0548   WBC 18.89* 20.89*   HGB 10.7* 10.1*   HCT 31.4* 29.6*    356     CMP:   Recent Labs   Lab 04/24/24  0558 04/25/24  0548   * 129*   K 5.3* 5.2*   CL 93* 94*   CO2 28 31*   * 183*   BUN 20 21   CREATININE 0.6 0.7   CALCIUM 8.6* 9.0   ANIONGAP 7* 4*     Cardiac Markers:   Recent Labs   Lab 04/25/24  0026   *       Significant Imaging: I have reviewed all pertinent imaging results/findings within the past 24 hours.    Assessment/Plan:      * Chronic respiratory failure with hypoxia  Patient with Hypoxic Respiratory failure which is Acute on chronic.  he is on home oxygen at 2 LPM. Supplemental oxygen was provided and noted- Oxygen Concentration (%):  [50] 50    .   Signs/symptoms of respiratory failure include- tachypnea, increased work of breathing, use of accessory muscles, and wheezing. Contributing diagnoses includes - COPD, Pleural effusion, Pneumonia, and lung mass  Labs and images were reviewed. Patient Has not had a recent ABG. Will treat underlying causes and adjust management of respiratory failure as follows-     Was given Solu-medrol x one dose per ED, with increased shortness breath and wheezing Solu-Medrol as we will do every 6 hours in addition to nebs.  Supplemental oxygen has been increased to Vapotherm at 25/50.      Continue supplemental oxygen.  We will treat with antibiotics, nebs, pulmonary hygiene    Abdominal distension  Patient had significant bowel movement this morning.  He has had multiple laxatives and we will continue.  GI consult      Anemia  Patient's anemia is currently controlled. Has not received any PRBCs to date. Etiology likely d/t  unknown, work up in progress  Current CBC reviewed-   Lab Results   Component Value Date    HGB 10.1 (L) 04/25/2024    HCT 29.6 (L) 04/25/2024 "     Monitor serial CBC and transfuse if patient becomes hemodynamically unstable, symptomatic or H/H drops below 7/21.    This is new finding, had normal H&H 6 months ago  Iron-deficiency, CEA negative        Hyponatremia  Patient has hyponatremia which is controlled,We will aim to correct the sodium by 4-6mEq in 24 hours. We will monitor sodium Daily. The hyponatremia is due to Dehydration/hypovolemia. We will treat the hyponatremia with IV fluids as follows: NS at 75 ml/hr. The patient's sodium results have been reviewed and are listed below.  Recent Labs   Lab 04/25/24  0548   *     We will continue to trend    Bilateral pleural effusion  Patient found to have moderate pleural effusion on imaging. I have personally reviewed and interpreted the following imaging: Xray and CT. A thoracentesis was deferred pending pulmonology consultation.  Most likely etiology includes  possible malignancy with new lung mass discovered on CT scan, and pneumonia    Holding off on diuresis at this time, as he appears dry  Patient given IV fluids reassess in a.m.      BPH (benign prostatic hyperplasia)  Continue finasteride      Chronic diastolic congestive heart failure  Patient is identified as having Diastolic (HFpEF) heart failure that is Chronic. CHF is currently controlled. Latest ECHO performed and demonstrates- No results found for this or any previous visit.  Monitor clinical status closely. Monitor on telemetry. Patient is off CHF pathway.  Monitor strict Is&Os and daily weights.  Fluid restriction not indicated at this time. Cardiology has not been consulted. Continue to stress to patient importance of self efficacy and  on diet for CHF. Last BNP reviewed- and noted below   Recent Labs   Lab 04/25/24  0026   *       Holding home Lasix 20 mg daily at this time as patient appears dry on exam    COPD exacerbation  Patient's COPD is with exacerbation noted by continued dyspnea and worsening of baseline  hypoxia currently.  Patient is currently off COPD Pathway. Continue scheduled inhalers Antibiotics and Supplemental oxygen and monitor respiratory status closely.   Was given 1 dose of IV Solu-Medrol with significant improvement, we will hold off on additional steroids at this time, add back PRN  Nebs ordered every 6 hours    Primary hypertension  Chronic, controlled. Latest blood pressure and vitals reviewed-     Temp:  [97.5 °F (36.4 °C)-98.5 °F (36.9 °C)]   Pulse:  []   Resp:  [16-26]   BP: (126-201)/(70-93)   SpO2:  [93 %-99 %] .   Home meds for hypertension were reviewed and noted below.   Hypertension Medications               furosemide (LASIX) 20 MG tablet Take 20 mg by mouth.    hydrALAZINE (APRESOLINE) 50 MG tablet Take 50 mg by mouth 2 (two) times daily.            While in the hospital, will manage blood pressure as follows; trend    Will utilize p.r.n. blood pressure medication only if patient's blood pressure greater than 180/110 and he develops symptoms such as worsening chest pain or shortness of breath.    Pleuritic chest pain  Likely related to pneumonia/pleural effusions and coughing  Initial troponin is normal, we will trend  EKG reviewed, sinus tach with first-degree AV block and right bundle branch block, no concerning ST or T-wave changes  Cardiac monitoring    Questionably related to distended abdomen some improvement      Pneumonia  We will treat for pneumonia with Rocephin and Zithromax at this time  Follow-up blood cultures  Sputum culture ordered, patient does have productive sounding cough however difficult to bring up secretions  Monitor chest x-ray      Right lower lobe lung mass  Mass in the right lower lobe in the roseanne fissural region measures 6.2 by 10 cm worrisome for malignancy  Pulmonology consulted  Uncertain as his last CT scan in September was negative we will continue to follow      VTE Risk Mitigation (From admission, onward)           Ordered     Reason for No  Pharmacological VTE Prophylaxis  Once        Comments: Possible procedure   Question:  Reasons:  Answer:  Physician Provided (leave comment)    04/23/24 0030     IP VTE HIGH RISK PATIENT  Once         04/23/24 0030     Place sequential compression device  Until discontinued         04/23/24 0030                    Discharge Planning   ERIKA:      Code Status: Full Code   Is the patient medically ready for discharge?:     Reason for patient still in hospital (select all that apply): Patient trending condition, Treatment, Consult recommendations, and Pending disposition  Discharge Plan A: Home with family, Home Health                  Gisella Callejas MD  Department of Hospital Medicine   [facility] - Med [facility]

## 2024-04-25 NOTE — ASSESSMENT & PLAN NOTE
Patient with chronic hypoxic respiratory failure on home oxygen of 2L/NC.     CT chest imaging in September 2023 with marked emphysematous changes and some bronchial wall thickening. No evidence of mass / consolidation / nodules on imaging. Trace bilateral effusions noted   Repeat CT chest yesterday reviewed in comparison to prior imaging. Low suspicion for underlying malignancy in light of no evidence of prior mass, lesion, nodule. No mediastinal lymphadenopathy  Sputum culture NGTD; follow results and adjust antibiotics as appropriate  Differential diagnosis: decompensated heart failure, acute infection, malignancy, aspiration  Legionella, fungitell and fungal immuno studies pending  Continue Rocephin and Azith  May ultimately need bronchoscopy  Prior to bronchoscopy; likely needs antibiotic course with repeat imaging to assess for further work-up/evaluation needs  Follow chest imaging as needed  Follow fever and WBC trends  Continue supplemental oxygen as needed; titrate to  maintain sats 90% or greater  Increased work of breathing with associated wheezing this morning  Vapotherm added, currently on 25/0.50 for work of breathing  Continue IV Solu-medrol   CT abdomen with significant constipation which I suspect is complicating the patient's shortness of breath due to diaphragmatic impairment  Mobilize as able; OOB in chair  Close pulmonary follow-up upon discharge

## 2024-04-25 NOTE — SIGNIFICANT EVENT
83-year-old white man currently admitted for acute on chronic hypoxic respiratory failure, severe constipation, right lower lobe lung mass, possible pneumonia, pleuritic chest pain, hypertension, COPD exacerbation, chronic diastolic CHF, bilateral pleural effusions, hyponatremia, anemia, and BPH.  Consults on this admission include Pulmonary.    Nursing staff called as patient with increased respiratory distress currently on Vapotherm.  Patient is alert and awake, tachycardic, tachypneic, in obvious respiratory distress, poor air movement with wheezing, severe abdominal distention, no peripheral edema.  Labs from yesterday with white blood cell count 18.89, hemoglobin 10.7, sodium 128, potassium 5.3, glucose 112, creatinine 0.6.  Recent labs with BNP 85, troponin negative x3, CEA 3.5, lactic acid level 1.0, procalcitonin level 0.09, respiratory viral panel negative, occult blood negative, urinalysis negative.  CTA of chest and CT scan of abdomen and pelvis have been reviewed.    Impression/plan:  1.  Acute on chronic hypoxic respiratory failure  2. Bilateral pleural effusions   3. Right lower lung mass  4. Possible pneumonia  5. Severe constipation with significant abdominal distention  6. COPD exacerbation  7.  Chronic diastolic CHF   8. Hyponatremia    -stat KUB  -stat chest x-ray  -stat ABG  -bladder scan at bedside with greater than 258 cc of urine  -in and out catheterization has been ordered  -stat labs to include lactic acid level and BNP  -continue enemas  -critical care nurse practitioner Ariadna Hodges did accompany me to go see the patient and will determine whether or not patient should be transferred to the ICU

## 2024-04-25 NOTE — CONSULTS
"[facility]  Gastroenterology  Consult Note    Patient Name: Jason Mayfield III  MRN: [id number]  Admission Date: 4/22/2024  Hospital Length of Stay: 3 days  Code Status: Full Code   Attending Provider: Gisella Aguilar MD   Consulting Provider: Chadwick Marie PA-C  Primary Care Physician: TANNER Mane MD  Principal Problem:Chronic respiratory failure with hypoxia    Inpatient consult to Gastroenterology  Consult performed by: Chadwick Marie PA-C  Consult ordered by: Gisella Aguilar MD  Reason for consult: Constipation      Subjective:     HPI:  The patient presented to the ER for worsening chest pain which had been intermittent for a few weeks. He is on 2L NC at baseline. Chest imaging showed extensive emphysema, bilateral pleural effusions, PNA and new right lower lobe mass. He was started on antibiotics and Pulmonary consulted. Yesterday, he was put on vapotherm and last night had a "significant event" of resp distress. He is more stable this morning. We have been consulted for severe constipation with abdominal distention. AXR and CT scan have shown significant stool burden without obstruction. The patient reports a history of chronic constipation at home. He doesn't take anything for it. He thinks his last BM was two weeks before admit. His son says the abdomen has gotten progressively distended over the last couple of days. The patient denies nausea or vomiting. Here he has been given two brown bomb enemas, two mag citrates, senna-docusate yesterday, oral lactulose and miralax yesterday and lactulose enema this morning. Within the half hour, the patient had a large non-bloody, loose/watery stool. His son reports some decrease in abd distention now.       Past Medical History:   Diagnosis Date    BPH (benign prostatic hyperplasia)     CHF (congestive heart failure)     Chronic back pain     Chronic hypoxic respiratory failure, on home oxygen therapy     COPD (chronic obstructive " pulmonary disease)     Coronary artery disease     Daily consumption of alcohol     Fibroadenoma of breast     History of tobacco abuse     Hypertension     Hypothyroidism, unspecified     Infrarenal abdominal aortic aneurysm (AAA) without rupture     Kidney stones     Neuropathy     NSTEMI (non-ST elevated myocardial infarction)     Personal history of colonic polyps     Shingles (herpes zoster) polyneuropathy        Past Surgical History:   Procedure Laterality Date    ADENOIDECTOMY      APPENDECTOMY      COLONOSCOPY      LUMBAR DISCECTOMY      REPAIR, RETINAL DETACHMENT      SPINAL CORD STIMULATOR IMPLANT      TONSILLECTOMY         Review of patient's allergies indicates:   Allergen Reactions    Sulfamethoxazole-trimethoprim Itching     Family History       Problem Relation (Age of Onset)    Colon cancer Maternal Grandfather    Coronary artery disease Sister    Emphysema Father    No Known Problems Mother          Tobacco Use    Smoking status: Former     Types: Cigarettes     Start date: 1/15/2024     Quit date: 1964     Years since quittin.0    Smokeless tobacco: Not on file   Substance and Sexual Activity    Alcohol use: Yes     Comment: 2 drinks hard liquor per day    Drug use: Not Currently    Sexual activity: Not on file     Review of Systems   Reason unable to perform ROS: review of systems limited by lethargy.   Respiratory:  Positive for shortness of breath (stable right now).    Gastrointestinal:  Negative for blood in stool.     Objective:     Vital Signs (Most Recent):  Temp: 98.3 °F (36.8 °C) (24 0804)  Pulse: 80 (24 0810)  Resp: 20 (24 0804)  BP: (!) 162/70 (24 0804)  SpO2: 97 % (24 0804) Vital Signs (24h Range):  Temp:  [97.5 °F (36.4 °C)-98.3 °F (36.8 °C)] 98.3 °F (36.8 °C)  Pulse:  [] 80  Resp:  [16-26] 20  SpO2:  [93 %-97 %] 97 %  BP: (146-201)/(70-91) 162/70     Weight: 82.8 kg (182 lb 8.7 oz) (24 0535)  Body mass  "index is 25.46 kg/m².      Intake/Output Summary (Last 24 hours) at 4/25/2024 1046  Last data filed at 4/25/2024 0345  Gross per 24 hour   Intake 150 ml   Output 1000 ml   Net -850 ml       Lines/Drains/Airways       Peripheral Intravenous Line  Duration                  Peripheral IV - Single Lumen 04/23/24 0102 22 G Posterior;Right Forearm 2 days                     Physical Exam  Constitutional:       Appearance: He is well-developed. He is ill-appearing.   HENT:      Head: Normocephalic and atraumatic.   Eyes:      Extraocular Movements: Extraocular movements intact.   Cardiovascular:      Rate and Rhythm: Normal rate and regular rhythm.      Heart sounds: Normal heart sounds. No murmur heard.  Pulmonary:      Effort: Pulmonary effort is normal. No respiratory distress.   Abdominal:      General: Bowel sounds are decreased. There is distension.      Palpations: Abdomen is soft. There is no mass.      Tenderness: There is no abdominal tenderness.   Musculoskeletal:      Right lower leg: No edema.      Left lower leg: No edema.   Skin:     General: Skin is warm and dry.   Neurological:      Mental Status: He is lethargic.      Cranial Nerves: No cranial nerve deficit.   Psychiatric:         Behavior: Behavior normal.          Significant Labs:  CBC:   Recent Labs   Lab 04/24/24  0558 04/25/24  0548   WBC 18.89* 20.89*   HGB 10.7* 10.1*   HCT 31.4* 29.6*    356     CMP:   Recent Labs   Lab 04/25/24  0548   *   CALCIUM 9.0   *   K 5.2*   CO2 31*   CL 94*   BUN 21   CREATININE 0.7     Coagulation: No results for input(s): "PT", "INR", "APTT" in the last 48 hours.    Significant Imaging:  Imaging results within the past 24 hours have been reviewed.  Assessment/Plan:     Pulmonary  Pneumonia  Patient on antibiotics. Cx no growth so far. Pulmonary following.    Right lower lobe lung mass  New finding. Pulmonary consult reviewed.     GI  Abdominal distension  Patient with significant constipation on " CT and AXR. S/p multiple therapies for constipation. Finally had a decent BM today. Abd distention improved some. Will monitor.   Continue Miralax daily. He will need a bowel regimen at discharge.   Recommend OOB to chair.   Monitor and correct electrolytes as needed.   Minimize medication that can contribute to constipation.   Clear liquid diet.       Thank you for your consult. I will follow-up with patient. Please contact us if you have any additional questions.    Chadwick Marie PA-C  Gastroenterology  [facility]

## 2024-04-25 NOTE — ASSESSMENT & PLAN NOTE
Patient found to have moderate pleural effusion on imaging. I have personally reviewed and interpreted the following imaging: Xray and CT. A thoracentesis was deferred pending pulmonology consultation.  Most likely etiology includes  possible malignancy with new lung mass discovered on CT scan, and pneumonia    Holding off on diuresis at this time, as he appears dry  Patient given IV fluids reassess in a.m.

## 2024-04-25 NOTE — ASSESSMENT & PLAN NOTE
Patient with significant constipation on CT and AXR. S/p multiple therapies for constipation. Finally had a decent BM today. Abd distention improved some. Will monitor.   Continue Miralax daily. He will need a bowel regimen at discharge.   Recommend OOB to chair.   Monitor and correct electrolytes as needed.   Minimize medication that can contribute to constipation.   Clear liquid diet.

## 2024-04-25 NOTE — PROGRESS NOTES
[facility]  Pulmonology Progress Note    Patient Name: Jason Mayfield III  MRN: [id number]  Admission Date: 4/22/2024  Hospital Length of Stay: 3 days  Code Status: Full Code   Attending Provider: Gisella Aguilar MD    Subjective:   History of present illness:  Jason Mayfield is a 83-year-old male with a past medical history significant for hypertension, NSTEMI, chronic diastolic heart failure, COPD / emphysema, chronic hypoxic respiratory failure on home oxygen of 2L/NC, BPH, hypothyroidism, AAA, chronic back pain, neuropathy, daily alcohol use, and former tobacco abuse who presented for evaluation of left lower chest pain and shortness of breath. Patient and family endorses a dry non-productive cough which had been ongoing for approximately 5days. Denies fever / chills or diarrhea at home. Endorses constipation and reports no bowel movement for almost two weeks. On exam, some mild LUQ tenderness with palpation; however, no guarding noted. Abdomen firm to touch with faint minimal bowel sounds. Due to his severe pain, the patient reports difficulty with taking deep breaths. Pain exacerbated by cough / deep inspiration. CTA chest completed at time of presentation demonstrates extensive emphysematous changes, bilateral pleural effusions, and RLL consolidation / mass. Patient was admitted by hospital medicine and pulmonary consulted for evaluation.     Of note, patient is followed by Dr. Gant with [facility] pulmonology. 60-pack year smoking history. Previous CT imaging in September 2023 with marked emphysematous changes and some bronchial wall thickening. No evidence of mass / consolidation / nodules on imaging. Trace bilateral effusions noted. Patient is a resident in [place], [place] with reports of 3 cats and 2 dogs in their home.     Patient recently seen by Dr. Gant in March 2023 who felt his shortness of breath was multifactorial. Patient is noted to have moderate  impairment on his pulmonary testing and felt his conditions were exacerbated by his underlying diastolic heart failure. In addition, it was felt a significant portion of his SOB was likely contributed to physical deconditioning as he demonstrates activity intolerance with an inability to walk greater than 100ft without worsening dyspnea.    Interval history, f/u chief complaint shortness of breath:  4/24: patient attempted repositioning himself in bed with notable increased work of breathing with associated wheezing noted on exam. Patient transitioned to Vapotherm 25/0.50 this morning and given IV Solu-Medrol with nebulizer treatment. Patient pulmonary status improved. Significant abdominal distention which is likely contributing to his underlying shortness of breath. Indicates some stool production yesterday; however, sounds minimal in nature in comparison to his abdominal imaging.   4/25: sedated at time of my exam; patient with improved air movement throughout on exam this morning; currently on Vapotherm 25/0.50 this morning. Patient became extremely anxious, tachypneic, and mildly tachycardic overnight. RT attempted to give scheduled neb tx, but patient had an episode of vomiting. Some reports of stool output following enema last night. Case and plan of care discussed with son at bedside this morning.     Objective:     Vital Signs (Most Recent):  Temp: 98.3 °F (36.8 °C) (04/25/24 1521)  Pulse: 104 (04/25/24 1521)  Resp: 19 (04/25/24 1521)  BP: (!) 193/85 (04/25/24 1521)  SpO2: 98 % (04/25/24 1521) Vital Signs (24h Range):  Temp:  [97.5 °F (36.4 °C)-98.5 °F (36.9 °C)] 98.3 °F (36.8 °C)  Pulse:  [] 104  Resp:  [16-26] 19  SpO2:  [93 %-99 %] 98 %  BP: (126-201)/(70-93) 193/85   Weight: 82.8 kg (182 lb 8.7 oz);  Body mass index is 25.46 kg/m².    Intake/Output Summary (Last 24 hours) at 4/25/2024 1559  Last data filed at 4/25/2024 1130  Gross per 24 hour   Intake 150 ml   Output 1450 ml   Net -1300 ml       Physical Exam  Vitals and nursing note reviewed.   Constitutional:       Comments: sleepy   HENT:      Head: Normocephalic.   Eyes:      Conjunctiva/sclera: Conjunctivae normal.   Cardiovascular:      Rate and Rhythm: Normal rate.   Pulmonary:      Effort: Pulmonary effort is normal.      Breath sounds: Wheezing present.   Abdominal:      Palpations: Abdomen is soft.   Musculoskeletal:         General: Normal range of motion.      Cervical back: Normal range of motion.   Skin:     General: Skin is warm and dry.   Neurological:      Mental Status: He is disoriented.   Psychiatric:         Mood and Affect: Mood normal.        Review of Systems: Negative except as indicated in HPI    Significant Labs:  CBC/Anemia Profile:  Recent Labs   Lab 04/24/24  0042 04/24/24  0558 04/25/24  0548   WBC  --  18.89* 20.89*   HGB  --  10.7* 10.1*   HCT  --  31.4* 29.6*   PLT  --  344 356   MCV  --  94 93   RDW  --  12.5 12.5   OCCULTBLOOD Negative  --   --    Chemistries:  Recent Labs   Lab 04/24/24  0558 04/25/24  0548   * 129*   K 5.3* 5.2*   CL 93* 94*   CO2 28 31*   BUN 20 21   CREATININE 0.6 0.7   CALCIUM 8.6* 9.0   MG 2.9* 3.8*   ABG  Recent Labs   Lab 04/25/24  0036   PH 7.345*   PO2 84   PCO2 53.7*   HCO3 29.3*   BE 4*     Assessment/Plan:   Chronic respiratory failure with hypoxia  Pneumonia  Patient with chronic hypoxic respiratory failure on home oxygen of 2L/NC.     CT chest imaging in September 2023 with marked emphysematous changes and some bronchial wall thickening. No evidence of mass / consolidation / nodules on imaging. Trace bilateral effusions noted   Repeat CT chest yesterday reviewed in comparison to prior imaging. Low suspicion for underlying malignancy in light of no evidence of prior mass, lesion, nodule. No mediastinal lymphadenopathy  Sputum culture NGTD; follow results and adjust antibiotics as appropriate  Differential diagnosis: decompensated heart failure, acute infection, malignancy,  aspiration  Legionella, fungitell and fungal immuno studies pending  Continue Rocephin and Azith  May ultimately need bronchoscopy  Prior to bronchoscopy; likely needs antibiotic course with repeat imaging to assess for further work-up/evaluation needs  Follow chest imaging as needed  Follow fever and WBC trends  Continue supplemental oxygen as needed; titrate to  maintain sats 90% or greater  Increased work of breathing with associated wheezing this morning  Vapotherm added, currently on 25/0.50 for work of breathing  Continue IV Solu-medrol   CT abdomen with significant constipation which I suspect is complicating the patient's shortness of breath due to diaphragmatic impairment  Mobilize as able; OOB in chair  Close pulmonary follow-up upon discharge    Right lower lobe lung mass  CT chest imaging in September 2023 with marked emphysematous changes and some bronchial wall thickening. No evidence of mass / consolidation / nodules on imaging. Trace bilateral effusions noted   Repeat CT chest yesterday reviewed in comparison to prior imaging. Low suspicion for underlying malignancy in light of no evidence of prior mass, lesion, nodule. No mediastinal lymphadenopathy  Sputum culture NGTD; follow   Differential diagnosis: decompensated heart failure, acute infection, malignancy, aspiration  Legionella, fungitell and fungal immuno studies pending  Continue Rocephin and Azith for now  Follow cultures and adjust antibiotics as appropriate  Follow chest imaging as needed  Follow fever and WBC trends     Deandra Hunt NP  Pulmonary and Critical Care  [facility]

## 2024-04-25 NOTE — SUBJECTIVE & OBJECTIVE
Jason Mayfield is a 83-year-old male with a past medical history significant for hypertension, NSTEMI, chronic diastolic heart failure, COPD / emphysema, chronic hypoxic respiratory failure on home oxygen of 2L/NC, BPH, hypothyroidism, AAA, chronic back pain, neuropathy, daily alcohol use, and former tobacco abuse who presented for evaluation of left lower chest pain and shortness of breath. Patient and family endorses a dry non-productive cough which had been ongoing for approximately 5days. Denies fever / chills or diarrhea at home. Endorses constipation and reports no bowel movement for almost two weeks. On exam, some mild LUQ tenderness with palpation; however, no guarding noted. Abdomen firm to touch with faint minimal bowel sounds. Due to his severe pain, the patient reports difficulty with taking deep breaths. Pain exacerbated by cough / deep inspiration. CTA chest completed at time of presentation demonstrates extensive emphysematous changes, bilateral pleural effusions, and RLL consolidation / mass. Patient was admitted by hospital medicine and pulmonary consulted for evaluation.     Of note, patient is followed by Dr. Gant with [facility] pulmonology. 60-pack year smoking history. Previous CT imaging in September 2023 with marked emphysematous changes and some bronchial wall thickening. No evidence of mass / consolidation / nodules on imaging. Trace bilateral effusions noted. Patient is a resident in [place], [place] with reports of 3 cats and 2 dogs in their home.     Patient recently seen by Dr. Gant in March 2023 who felt his shortness of breath was multifactorial. Patient is noted to have moderate impairment on his pulmonary testing and felt his conditions were exacerbated by his underlying diastolic heart failure. In addition, it was felt a significant portion of his SOB was likely contributed to physical deconditioning as he demonstrates activity intolerance with an inability to walk greater  than 100ft without worsening dyspnea.    Interval history, f/u chief complaint shortness of breath:  4/24: patient attempted repositioning himself in bed with notable increased work of breathing with associated wheezing noted on exam. Patient transitioned to Vapotherm 25/0.50 this morning and given IV Solu-Medrol with nebulizer treatment. Patient pulmonary status improved. Significant abdominal distention which is likely contributing to his underlying shortness of breath. Indicates some stool production yesterday; however, sounds minimal in nature in comparison to his abdominal imaging.   4/25: sedated at time of my exam; patient with improved air movement throughout on exam this morning; currently on Vapotherm 25/0.50 this morning. Patient became extremely anxious, tachypneic, and mildly tachycardic overnight. RT attempted to give scheduled neb tx, but patient had an episode of vomiting. Some reports of stool output following enema last night. Case and plan of care discussed with son at bedside this morning.     Objective:     Vital Signs (Most Recent):  Temp: 98.3 °F (36.8 °C) (04/25/24 1521)  Pulse: 104 (04/25/24 1521)  Resp: 19 (04/25/24 1521)  BP: (!) 193/85 (04/25/24 1521)  SpO2: 98 % (04/25/24 1521) Vital Signs (24h Range):  Temp:  [97.5 °F (36.4 °C)-98.5 °F (36.9 °C)] 98.3 °F (36.8 °C)  Pulse:  [] 104  Resp:  [16-26] 19  SpO2:  [93 %-99 %] 98 %  BP: (126-201)/(70-93) 193/85   Weight: 82.8 kg (182 lb 8.7 oz);  Body mass index is 25.46 kg/m².    Intake/Output Summary (Last 24 hours) at 4/25/2024 1559  Last data filed at 4/25/2024 1130  Gross per 24 hour   Intake 150 ml   Output 1450 ml   Net -1300 ml      Physical Exam  Vitals and nursing note reviewed.   Constitutional:       Comments: sleepy   HENT:      Head: Normocephalic.   Eyes:      Conjunctiva/sclera: Conjunctivae normal.   Cardiovascular:      Rate and Rhythm: Normal rate.   Pulmonary:      Effort: Pulmonary effort is normal.      Breath sounds:  Wheezing present.   Abdominal:      Palpations: Abdomen is soft.   Musculoskeletal:         General: Normal range of motion.      Cervical back: Normal range of motion.   Skin:     General: Skin is warm and dry.   Neurological:      Mental Status: He is disoriented.   Psychiatric:         Mood and Affect: Mood normal.        Review of Systems: Negative except as indicated in HPI    Significant Labs:  CBC/Anemia Profile:  Recent Labs   Lab 04/24/24  0042 04/24/24  0558 04/25/24  0548   WBC  --  18.89* 20.89*   HGB  --  10.7* 10.1*   HCT  --  31.4* 29.6*   PLT  --  344 356   MCV  --  94 93   RDW  --  12.5 12.5   OCCULTBLOOD Negative  --   --    Chemistries:  Recent Labs   Lab 04/24/24  0558 04/25/24  0548   * 129*   K 5.3* 5.2*   CL 93* 94*   CO2 28 31*   BUN 20 21   CREATININE 0.6 0.7   CALCIUM 8.6* 9.0   MG 2.9* 3.8*

## 2024-04-25 NOTE — ASSESSMENT & PLAN NOTE
Patient's anemia is currently controlled. Has not received any PRBCs to date. Etiology likely d/t  unknown, work up in progress  Current CBC reviewed-   Lab Results   Component Value Date    HGB 10.1 (L) 04/25/2024    HCT 29.6 (L) 04/25/2024     Monitor serial CBC and transfuse if patient becomes hemodynamically unstable, symptomatic or H/H drops below 7/21.    This is new finding, had normal H&H 6 months ago  Iron-deficiency, CEA negative

## 2024-04-25 NOTE — SUBJECTIVE & OBJECTIVE
Interval History:  Patient seen and examined in room.  Discussed with Pulmonary and GI.  Patient had large bowel movement this morning.  Still has distended abdomen.  GI we will continue to follow.    Review of Systems   Constitutional:  Positive for activity change, appetite change and fatigue.   Respiratory:  Positive for shortness of breath and wheezing. Negative for chest tightness.    Cardiovascular:  Negative for chest pain, palpitations and leg swelling.   Gastrointestinal:  Positive for abdominal pain and constipation.   Musculoskeletal:  Positive for arthralgias.   Neurological:  Positive for weakness.   All other systems reviewed and are negative.    Objective:     Vital Signs (Most Recent):  Temp: 98.5 °F (36.9 °C) (04/25/24 1220)  Pulse: 91 (04/25/24 1232)  Resp: 18 (04/25/24 1232)  BP: (!) 126/93 (04/25/24 1220)  SpO2: 98 % (04/25/24 1232) Vital Signs (24h Range):  Temp:  [97.5 °F (36.4 °C)-98.5 °F (36.9 °C)] 98.5 °F (36.9 °C)  Pulse:  [] 91  Resp:  [16-26] 18  SpO2:  [93 %-99 %] 98 %  BP: (126-201)/(70-93) 126/93     Weight: 82.8 kg (182 lb 8.7 oz)  Body mass index is 25.46 kg/m².    Intake/Output Summary (Last 24 hours) at 4/25/2024 1441  Last data filed at 4/25/2024 1130  Gross per 24 hour   Intake 150 ml   Output 1450 ml   Net -1300 ml         Physical Exam  Vitals reviewed.   Constitutional:       Appearance: He is ill-appearing.   HENT:      Head: Normocephalic and atraumatic.      Mouth/Throat:      Mouth: Mucous membranes are moist.      Pharynx: Oropharynx is clear.   Eyes:      Extraocular Movements: Extraocular movements intact.      Conjunctiva/sclera: Conjunctivae normal.   Cardiovascular:      Rate and Rhythm: Regular rhythm. Tachycardia present.      Pulses: Normal pulses.      Heart sounds: Normal heart sounds.   Pulmonary:      Effort: Respiratory distress present.      Breath sounds: Rhonchi present.   Abdominal:      General: Bowel sounds are normal. There is distension.       "Tenderness: There is no abdominal tenderness. There is no guarding or rebound.   Musculoskeletal:         General: Normal range of motion.      Cervical back: Normal range of motion and neck supple.   Skin:     General: Skin is warm and dry.   Neurological:      Mental Status: He is alert and oriented to person, place, and time. Mental status is at baseline.      Motor: Weakness present.   Psychiatric:         Mood and Affect: Mood normal.         Behavior: Behavior normal.         Thought Content: Thought content normal.             Significant Labs: All pertinent labs within the past 24 hours have been reviewed.  Blood Culture: No results for input(s): "LABBLOO" in the last 48 hours.  CBC:   Recent Labs   Lab 04/24/24  0558 04/25/24  0548   WBC 18.89* 20.89*   HGB 10.7* 10.1*   HCT 31.4* 29.6*    356     CMP:   Recent Labs   Lab 04/24/24  0558 04/25/24  0548   * 129*   K 5.3* 5.2*   CL 93* 94*   CO2 28 31*   * 183*   BUN 20 21   CREATININE 0.6 0.7   CALCIUM 8.6* 9.0   ANIONGAP 7* 4*     Cardiac Markers:   Recent Labs   Lab 04/25/24  0026   *       Significant Imaging: I have reviewed all pertinent imaging results/findings within the past 24 hours.  "

## 2024-04-26 LAB
1,3 BETA GLUCAN SER-MCNC: <31 PG/ML
ANION GAP SERPL CALC-SCNC: 4 MMOL/L (ref 8–16)
BASOPHILS # BLD AUTO: 0.03 K/UL (ref 0–0.2)
BASOPHILS NFR BLD: 0.2 % (ref 0–1.9)
BUN SERPL-MCNC: 18 MG/DL (ref 8–23)
CALCIUM SERPL-MCNC: 9 MG/DL (ref 8.7–10.5)
CHLORIDE SERPL-SCNC: 98 MMOL/L (ref 95–110)
CO2 SERPL-SCNC: 31 MMOL/L (ref 23–29)
CREAT SERPL-MCNC: 0.7 MG/DL (ref 0.5–1.4)
DIFFERENTIAL METHOD BLD: ABNORMAL
EOSINOPHIL # BLD AUTO: 0 K/UL (ref 0–0.5)
EOSINOPHIL NFR BLD: 0 % (ref 0–8)
ERYTHROCYTE [DISTWIDTH] IN BLOOD BY AUTOMATED COUNT: 12.8 % (ref 11.5–14.5)
EST. GFR  (NO RACE VARIABLE): >60 ML/MIN/1.73 M^2
FUNGITELL COMMENTS: NEGATIVE
GLUCOSE SERPL-MCNC: 165 MG/DL (ref 70–110)
HCT VFR BLD AUTO: 29.4 % (ref 40–54)
HGB BLD-MCNC: 9.7 G/DL (ref 14–18)
IMM GRANULOCYTES # BLD AUTO: 0.36 K/UL (ref 0–0.04)
IMM GRANULOCYTES NFR BLD AUTO: 1.9 % (ref 0–0.5)
L PNEUMO AG UR QL IA: NEGATIVE
LYMPHOCYTES # BLD AUTO: 0.9 K/UL (ref 1–4.8)
LYMPHOCYTES NFR BLD: 4.8 % (ref 18–48)
MAGNESIUM SERPL-MCNC: 2.7 MG/DL (ref 1.6–2.6)
MCH RBC QN AUTO: 31.4 PG (ref 27–31)
MCHC RBC AUTO-ENTMCNC: 33 G/DL (ref 32–36)
MCV RBC AUTO: 95 FL (ref 82–98)
MONOCYTES # BLD AUTO: 1.2 K/UL (ref 0.3–1)
MONOCYTES NFR BLD: 6.6 % (ref 4–15)
NEUTROPHILS # BLD AUTO: 16.1 K/UL (ref 1.8–7.7)
NEUTROPHILS NFR BLD: 86.5 % (ref 38–73)
NRBC BLD-RTO: 0 /100 WBC
PLATELET # BLD AUTO: 345 K/UL (ref 150–450)
PMV BLD AUTO: 9.5 FL (ref 9.2–12.9)
POTASSIUM SERPL-SCNC: 4.7 MMOL/L (ref 3.5–5.1)
RBC # BLD AUTO: 3.09 M/UL (ref 4.6–6.2)
SODIUM SERPL-SCNC: 133 MMOL/L (ref 136–145)
WBC # BLD AUTO: 18.65 K/UL (ref 3.9–12.7)

## 2024-04-26 PROCEDURE — 97530 THERAPEUTIC ACTIVITIES: CPT

## 2024-04-26 PROCEDURE — 94799 UNLISTED PULMONARY SVC/PX: CPT

## 2024-04-26 PROCEDURE — 80048 BASIC METABOLIC PNL TOTAL CA: CPT | Performed by: NURSE PRACTITIONER

## 2024-04-26 PROCEDURE — 63600175 PHARM REV CODE 636 W HCPCS: Mod: JZ,JG | Performed by: INTERNAL MEDICINE

## 2024-04-26 PROCEDURE — 63600175 PHARM REV CODE 636 W HCPCS: Performed by: NURSE PRACTITIONER

## 2024-04-26 PROCEDURE — 25000003 PHARM REV CODE 250: Performed by: NURSE PRACTITIONER

## 2024-04-26 PROCEDURE — 94640 AIRWAY INHALATION TREATMENT: CPT

## 2024-04-26 PROCEDURE — 11000001 HC ACUTE MED/SURG PRIVATE ROOM

## 2024-04-26 PROCEDURE — 36415 COLL VENOUS BLD VENIPUNCTURE: CPT | Performed by: NURSE PRACTITIONER

## 2024-04-26 PROCEDURE — 85025 COMPLETE CBC W/AUTO DIFF WBC: CPT | Performed by: NURSE PRACTITIONER

## 2024-04-26 PROCEDURE — 94761 N-INVAS EAR/PLS OXIMETRY MLT: CPT

## 2024-04-26 PROCEDURE — 25000242 PHARM REV CODE 250 ALT 637 W/ HCPCS: Performed by: INTERNAL MEDICINE

## 2024-04-26 PROCEDURE — 99900035 HC TECH TIME PER 15 MIN (STAT)

## 2024-04-26 PROCEDURE — 63700000 PHARM REV CODE 250 ALT 637 W/O HCPCS: Performed by: INTERNAL MEDICINE

## 2024-04-26 PROCEDURE — 25000003 PHARM REV CODE 250: Performed by: INTERNAL MEDICINE

## 2024-04-26 PROCEDURE — 97535 SELF CARE MNGMENT TRAINING: CPT

## 2024-04-26 PROCEDURE — 27100171 HC OXYGEN HIGH FLOW UP TO 24 HOURS

## 2024-04-26 PROCEDURE — 83735 ASSAY OF MAGNESIUM: CPT | Performed by: NURSE PRACTITIONER

## 2024-04-26 RX ORDER — PREDNISONE 20 MG/1
40 TABLET ORAL DAILY
Qty: 4 TABLET | Refills: 0 | Status: COMPLETED | OUTPATIENT
Start: 2024-04-27 | End: 2024-04-28

## 2024-04-26 RX ORDER — AZITHROMYCIN 250 MG/1
500 TABLET, FILM COATED ORAL DAILY
Status: COMPLETED | OUTPATIENT
Start: 2024-04-27 | End: 2024-04-27

## 2024-04-26 RX ORDER — BISACODYL 10 MG/1
10 SUPPOSITORY RECTAL DAILY
Status: DISCONTINUED | OUTPATIENT
Start: 2024-04-27 | End: 2024-05-02 | Stop reason: HOSPADM

## 2024-04-26 RX ORDER — PREDNISONE 20 MG/1
20 TABLET ORAL DAILY
Qty: 3 TABLET | Refills: 0 | Status: COMPLETED | OUTPATIENT
Start: 2024-04-29 | End: 2024-05-01

## 2024-04-26 RX ORDER — FLUCONAZOLE 100 MG/1
100 TABLET ORAL DAILY
Status: DISCONTINUED | OUTPATIENT
Start: 2024-04-26 | End: 2024-04-27

## 2024-04-26 RX ADMIN — BUDESONIDE INHALATION 0.5 MG: 0.5 SUSPENSION RESPIRATORY (INHALATION) at 07:04

## 2024-04-26 RX ADMIN — METHYLPREDNISOLONE SODIUM SUCCINATE 60 MG: 125 INJECTION, POWDER, FOR SOLUTION INTRAMUSCULAR; INTRAVENOUS at 05:04

## 2024-04-26 RX ADMIN — FLUCONAZOLE 100 MG: 100 TABLET ORAL at 06:04

## 2024-04-26 RX ADMIN — GABAPENTIN 1200 MG: 400 CAPSULE ORAL at 09:04

## 2024-04-26 RX ADMIN — GABAPENTIN 1200 MG: 400 CAPSULE ORAL at 04:04

## 2024-04-26 RX ADMIN — LACTULOSE 20 G: 20 SOLUTION ORAL at 04:04

## 2024-04-26 RX ADMIN — AZITHROMYCIN MONOHYDRATE 500 MG: 500 INJECTION, POWDER, LYOPHILIZED, FOR SOLUTION INTRAVENOUS at 12:04

## 2024-04-26 RX ADMIN — HYDRALAZINE HYDROCHLORIDE 50 MG: 25 TABLET, FILM COATED ORAL at 09:04

## 2024-04-26 RX ADMIN — SENNOSIDES AND DOCUSATE SODIUM 2 TABLET: 50; 8.6 TABLET ORAL at 09:04

## 2024-04-26 RX ADMIN — LACTULOSE 20 G: 20 SOLUTION ORAL at 09:04

## 2024-04-26 RX ADMIN — ALBUTEROL SULFATE 2.5 MG: 2.5 SOLUTION RESPIRATORY (INHALATION) at 07:04

## 2024-04-26 RX ADMIN — ALBUTEROL SULFATE 2.5 MG: 2.5 SOLUTION RESPIRATORY (INHALATION) at 01:04

## 2024-04-26 RX ADMIN — ALBUTEROL SULFATE 2.5 MG: 2.5 SOLUTION RESPIRATORY (INHALATION) at 04:04

## 2024-04-26 RX ADMIN — METHYLPREDNISOLONE SODIUM SUCCINATE 60 MG: 125 INJECTION, POWDER, FOR SOLUTION INTRAMUSCULAR; INTRAVENOUS at 12:04

## 2024-04-26 RX ADMIN — TRAMADOL HYDROCHLORIDE 50 MG: 50 TABLET, COATED ORAL at 06:04

## 2024-04-26 RX ADMIN — ATORVASTATIN CALCIUM 40 MG: 40 TABLET, FILM COATED ORAL at 09:04

## 2024-04-26 RX ADMIN — FINASTERIDE 5 MG: 5 TABLET, FILM COATED ORAL at 09:04

## 2024-04-26 RX ADMIN — ALBUTEROL SULFATE 2.5 MG: 2.5 SOLUTION RESPIRATORY (INHALATION) at 12:04

## 2024-04-26 RX ADMIN — POLYETHYLENE GLYCOL 3350 17 G: 17 POWDER, FOR SOLUTION ORAL at 09:04

## 2024-04-26 RX ADMIN — CEFTRIAXONE 2 G: 2 INJECTION, POWDER, FOR SOLUTION INTRAMUSCULAR; INTRAVENOUS at 05:04

## 2024-04-26 NOTE — PT/OT/SLP PROGRESS
Physical Therapy Treatment    Patient Name:  Jason Mayfield III   MRN:  [id number]    Recommendations:     Discharge Recommendations: Moderate Intensity Therapy  Discharge Equipment Recommendations: to be determined by next level of care  Barriers to discharge: None    Assessment:     Jason Mayfield III is a 83 y.o. male admitted with a medical diagnosis of Chronic respiratory failure with hypoxia.  He presents with the following impairments/functional limitations: weakness, impaired endurance, impaired functional mobility, gait instability, impaired balance, pain, decreased safety awareness, decreased lower extremity function, decreased ROM, impaired cardiopulmonary response to activity.    Rehab Prognosis: Good; patient would benefit from acute skilled PT services to address these deficits and reach maximum level of function.    Recent Surgery: * No surgery found *      Plan:     During this hospitalization, patient to be seen 3 x/week to address the identified rehab impairments via gait training, therapeutic activities, therapeutic exercises and progress toward the following goals:    Plan of Care Expires:  05/08/24    Subjective     Chief Complaint: Pt is motivated to participate. Skin breakdown noted on pt's sacrum, nurse notified.  Patient/Family Comments/goals: none stated  Pain/Comfort:  Pain Rating 1: 7/10  Location - Side 1: Right  Location - Orientation 1: generalized  Location 1: foot  Pain Addressed 1: Reposition, Distraction, Cessation of Activity  Pain Rating Post-Intervention 1: 7/10      Objective:     Communicated with nurse Holbrook and epic chart review prior to session.  Patient found HOB elevated with peripheral IV, telemetry, anderson catheter, oxygen (vapotherm) upon PT entry to room.     General Precautions: Standard, fall, respiratory  Orthopedic Precautions: N/A  Braces: N/A  Respiratory Status: Vapotherm 15L, 35%     Functional Mobility:  Gait belt applied - Yes  Bed Mobility  Rolling Right:  "minimum assistance  Scooting: minimum assistance  Supine to Sit: minimum assistance for LE management and trunk management  Sit to Supine: minimum assistance for LE management and trunk management  Transfers  Sit to Stand: minimum assistance with hand-held assist  Toilet Transfer: total A for toileting hygiene   Bed to BSC: minimum assistance with hand-held assist  BSC to Bed: minimum assistance with rolling walker using Stand Pivot  Gait  Patient ambulated 3ft to [facility] with rolling walker and minimum assistance. Patient demonstrates unsteady gait. No c/o dizziness, frequent rest due to SOB, educated about pursed lip breathing technique and cued for use with mobility, pt able to recover quickly with cuing. All lines remained intact throughout ambulation trail, limited by vapotherm line.  Balance  Sitting: minimum assistance  Standing: minimum assistance    AM-PAC 6 CLICK MOBILITY  Turning over in bed (including adjusting bedclothes, sheets and blankets)?: 3  Sitting down on and standing up from a chair with arms (e.g., wheelchair, bedside commode, etc.): 3  Moving from lying on back to sitting on the side of the bed?: 3  Moving to and from a bed to a chair (including a wheelchair)?: 3  Need to walk in hospital room?: 3  Climbing 3-5 steps with a railing?: 1 (NT)  Basic Mobility Total Score: 16       Treatment & Education:  Reviewed role of PT in acute care and POC. Pt tolerated interventions well. Reviewed importance of OOB activities, activity pacing, and HEP (marching/hip flex, hip abd, heel slides/LAQ, quad sets, ankle pumps) in order to maintain/regain strength. Encouraged to sit up for all meals. Reviewed proper use of RW for safety and to reduce risk of falling. Reviewed "call don't fall" policy and increased risk of falling due to weakness, instructed to utilize call bell for assistance with all transfers. Pt agreeable to all requests.    Patient left with bed in chair position with all lines " intact, call button in reach, bed alarm on, and family present..    GOALS:   Multidisciplinary Problems       Physical Therapy Goals          Problem: Physical Therapy    Goal Priority Disciplines Outcome Goal Variances Interventions   Physical Therapy Goal     PT, PT/OT Progressing     Description: Goals to be met by 5/8/24.  1. Pt will complete bed mobility MOD I.  2. Pt will complete sit to stand MOD I.  3. Pt will ambulate 200ft MOD I using RW.  4. Pt will increase AMPAC score by 2 points to progress functional mobility.                       Time Tracking:     PT Received On: 04/26/24  PT Start Time: 1105     PT Stop Time: 1130  PT Total Time (min): 25 min     Billable Minutes: Therapeutic Activity 25min    Treatment Type: Treatment  PT/PTA: PT     Number of PTA visits since last PT visit: 0     04/26/2024

## 2024-04-26 NOTE — ASSESSMENT & PLAN NOTE
Patient with significant constipation and large stool burden.  Patient given multiple laxatives and enemas with some results  GI consult appreciated  Continue lactulose, MiraLax, Dulcolax suppository  Encourage patient to mobilize

## 2024-04-26 NOTE — ASSESSMENT & PLAN NOTE
Patient with chronic hypoxic respiratory failure on home oxygen of 2L/NC.     CT chest imaging in September 2023 with marked emphysematous changes and some bronchial wall thickening. No evidence of mass / consolidation / nodules on imaging. Trace bilateral effusions noted   Repeat CT chest yesterday reviewed in comparison to prior imaging. Low suspicion for underlying malignancy in light of no evidence of prior mass, lesion, nodule. No mediastinal lymphadenopathy  Sputum culture with Candida Albicans  Differential diagnosis: decompensated heart failure, acute infection, malignancy, aspiration  Legionella negative   Fungitell and fungal immuno studies pending  Complete Rocephin and Azith course  Follow chest imaging as needed  Follow fever and WBC trends  Work of breathing and wheezing significantly improved this am with no acute findings  Weaning Vapotherm, currently on 15/0.35  Continue supplemental oxygen as needed; titrate to  maintain sats 90% or greater  D/C IV steroids; begin Prednisone taper tomorrow  CT abdomen with significant constipation which I suspect is complicating the patient's shortness of breath due to diaphragmatic impairment  Mobilize as able; OOB in chair  Close pulmonary follow-up upon discharge. May ultimately need bronchoscopy and further work-up of lung mass. Repeat imaging for clearance / improvement in 8-12 weeks optimal prior to further work-up.

## 2024-04-26 NOTE — PLAN OF CARE
Problem: Adult Inpatient Plan of Care  Goal: Plan of Care Review  Outcome: Met  Goal: Patient-Specific Goal (Individualized)  Outcome: Met  Goal: Absence of Hospital-Acquired Illness or Injury  Outcome: Met  Goal: Optimal Comfort and Wellbeing  Outcome: Met  Goal: Readiness for Transition of Care  Outcome: Met     Problem: Infection  Goal: Absence of Infection Signs and Symptoms  Outcome: Met     Problem: Fluid Imbalance (Pneumonia)  Goal: Fluid Balance  Outcome: Met     Problem: Infection (Pneumonia)  Goal: Resolution of Infection Signs and Symptoms  Outcome: Met     Problem: Respiratory Compromise (Pneumonia)  Goal: Effective Oxygenation and Ventilation  Outcome: Met     Problem: Fall Injury Risk  Goal: Absence of Fall and Fall-Related Injury  Outcome: Met     Problem: Skin Injury Risk Increased  Goal: Skin Health and Integrity  Outcome: Met

## 2024-04-26 NOTE — PLAN OF CARE
PT EVAL complete. Required MIN A for bed mobility, MIN A for transfer. Recommending moderate intensity therapy upon d/c.

## 2024-04-26 NOTE — ASSESSMENT & PLAN NOTE
Patient's anemia is currently controlled. Has not received any PRBCs to date. Etiology likely d/t  unknown, work up in progress  Current CBC reviewed-   Lab Results   Component Value Date    HGB 9.7 (L) 04/26/2024    HCT 29.4 (L) 04/26/2024     Monitor serial CBC and transfuse if patient becomes hemodynamically unstable, symptomatic or H/H drops below 7/21.    This is new finding, had normal H&H 6 months ago  Iron-deficiency, CEA negative

## 2024-04-26 NOTE — PROGRESS NOTES
[facility] Med Surg [facility]  [facility]  Progress Note    Patient Name: Jaosn Mayfield III  MRN: [id number]  Patient Class: IP- Inpatient   Admission Date: 4/22/2024  Length of Stay: 4 days  Attending Physician: Gisella Aguilar MD  Primary Care Provider: TANNER Mane MD        Subjective:     Principal Problem:Chronic respiratory failure with hypoxia        HPI:  Patient is 83-year-old male with past medical history significant for hypertension, NSTEMI, diastolic congestive heart failure, COPD, chronic hypoxic respiratory failure on home O2 @ 2L/min NC, BPH,  fibroadenoma of breast, hypothyroidism, infrarenal AAA(3.4 cm),  chronic back pain, neuropathy, shingles infection, kidney stones, colonic polyps, daily alcohol use, and former tobacco abuse who presented to ED with complaints of chest pain and dyspnea.  He reports  that for the past 2-3 days he has been having left upper chest pain, moderate, worsened with deep breathing, chronic cough, with no radiation into neck, jaw, or arms.  He also reports some generalized weakness and trouble walking over the past 2-3 days as well.  He reports some wheezing although states that this is his baseline. He denies fever, chills , abdominal pain, nausea, vomiting, diarrhea, leg pain, dysuria, or worsening edema. he states that he is normally on 2 L per minute nasal cannula, however over the past couple of days he has turned it up to 3-4 liters/minute.  He is followed by Dr. Gant.  Vital signs on arrival to ED-[facility]  with 98.3 temp, heart rate 107, respiratory rate 25, blood pressure 135/62, 91% SpO2 on 4 liters/minute nasal cannula.  He has remained afebrile while in ED. oxygen has been weaned to 3 liters/minute nasal cannula with SpO2 96%.  Lab workup reveals WBC 17.33, hemoglobin 11.5, hematocrit 34.5, sodium 131, potassium 4.7, chloride 93, CO2 24, BUN 19, creatinine 0.8, glucose 129, normal LFTs, BNP 85, troponin 0.010, lactic acid 1.0, procalcitonin 0.09,   and normal urinalysis.  EKG:  Sinus tach with first-degree AV block and right bundle-branch block, heart rate 102.  Chest x-ray notes thoracic spinal cord stimulator leads, normal heart size, mild aortic atherosclerosis, mild volume loss with vascular crowding in both lung bases.  CTA of chest shows extensive emphysema, moderate left-sided pleural effusion, mild right-sided pleural effusion, moderate right basilar atelectasis versus consolidation and mild left basilar atelectasis versus consolidation, suggestion of mass in the right lower lobe perifissural region measuring 6.2 x 10 cm, worrisome for malignancy.  He was given Rocephin, DuoNeb, Solu-Medrol, gabapentin, and tramadol while in ED. Hospital Medicine was consulted for admission due to worsening hypoxic respiratory failure, pneumonia, and newly discovered lung mass.    Overview/Hospital Course:    Patient was attempting to eat breakfast and reposition himself in bed with increased work of breathing and wheezing on exam.  Pulmonary evaluated the patient and he was started on Vapotherm 25/50 Solu-Medrol IV and nebulizer.  Patient's shortness of breath improved.  He still has significant abdominal distention with minimal stool output.    Patient 5 large bowel with in distention.  Scheduled nebs high-dose steroids his oxygenation has improved.  Is continued on bowel protocol.    Interval History:  Patient seen and examined at bedside.  He says he just feels blah.  Less shortness of breath, less abdominal distention.    Review of Systems   Constitutional:  Positive for activity change, appetite change and fatigue.   Respiratory:  Positive for shortness of breath. Negative for chest tightness and wheezing.    Cardiovascular:  Negative for chest pain, palpitations and leg swelling.   Gastrointestinal:  Positive for abdominal pain and constipation.   Musculoskeletal:  Positive for arthralgias.   Neurological:  Positive for weakness.   All other systems reviewed  and are negative.    Objective:     Vital Signs (Most Recent):  Temp: 98.5 °F (36.9 °C) (04/26/24 1225)  Pulse: 87 (04/26/24 1507)  Resp: 18 (04/26/24 1230)  BP: (!) 149/66 (04/26/24 1225)  SpO2: 95 % (04/26/24 1230) Vital Signs (24h Range):  Temp:  [97.6 °F (36.4 °C)-98.5 °F (36.9 °C)] 98.5 °F (36.9 °C)  Pulse:  [] 87  Resp:  [16-20] 18  SpO2:  [93 %-98 %] 95 %  BP: (128-149)/(60-71) 149/66     Weight: 82 kg (180 lb 12.8 oz)  Body mass index is 25.22 kg/m².    Intake/Output Summary (Last 24 hours) at 4/26/2024 1634  Last data filed at 4/26/2024 0434  Gross per 24 hour   Intake --   Output 1160 ml   Net -1160 ml         Physical Exam  Vitals reviewed.   Constitutional:       Appearance: He is ill-appearing.   HENT:      Head: Normocephalic and atraumatic.      Mouth/Throat:      Mouth: Mucous membranes are moist.      Pharynx: Oropharynx is clear.   Eyes:      Extraocular Movements: Extraocular movements intact.      Conjunctiva/sclera: Conjunctivae normal.   Cardiovascular:      Rate and Rhythm: Regular rhythm. Tachycardia present.      Pulses: Normal pulses.      Heart sounds: Normal heart sounds.   Pulmonary:      Effort: Pulmonary effort is normal.      Breath sounds: Rhonchi present.   Abdominal:      General: Bowel sounds are normal. There is distension.      Tenderness: There is no abdominal tenderness. There is no guarding or rebound.   Musculoskeletal:         General: Normal range of motion.      Cervical back: Normal range of motion and neck supple.   Skin:     General: Skin is warm and dry.   Neurological:      Mental Status: He is alert and oriented to person, place, and time. Mental status is at baseline.      Motor: Weakness present.   Psychiatric:         Mood and Affect: Mood normal.         Behavior: Behavior normal.         Thought Content: Thought content normal.             Significant Labs: All pertinent labs within the past 24 hours have been reviewed.  CBC:   Recent Labs   Lab  04/25/24  0548 04/26/24  0429   WBC 20.89* 18.65*   HGB 10.1* 9.7*   HCT 29.6* 29.4*    345     CMP:   Recent Labs   Lab 04/25/24  0548 04/26/24  0429   * 133*   K 5.2* 4.7   CL 94* 98   CO2 31* 31*   * 165*   BUN 21 18   CREATININE 0.7 0.7   CALCIUM 9.0 9.0   ANIONGAP 4* 4*       Significant Imaging: I have reviewed all pertinent imaging results/findings within the past 24 hours.    Assessment/Plan:      * Chronic respiratory failure with hypoxia  Patient with Hypoxic Respiratory failure which is Acute on chronic.  he is on home oxygen at 2 LPM. Supplemental oxygen was provided and noted- Oxygen Concentration (%):  [35-50] 35    .   Signs/symptoms of respiratory failure include- tachypnea, increased work of breathing, use of accessory muscles, and wheezing. Contributing diagnoses includes - COPD, Pleural effusion, Pneumonia, and lung mass  Labs and images were reviewed. Patient Has not had a recent ABG. Will treat underlying causes and adjust management of respiratory failure as follows-     Was given Solu-medrol x one dose per ED, with increased shortness breath and wheezing Solu-Medrol as we will do every 6 hours in addition to nebs.  Supplemental oxygen has been increased to Vapotherm at 10/50.      Continue supplemental oxygen.  We will treat with antibiotics, nebs, pulmonary hygiene    Abdominal distension  Patient with significant constipation and large stool burden.  Patient given multiple laxatives and enemas with some results  GI consult appreciated  Continue lactulose, MiraLax, Dulcolax suppository  Encourage patient to mobilize      Anemia  Patient's anemia is currently controlled. Has not received any PRBCs to date. Etiology likely d/t  unknown, work up in progress  Current CBC reviewed-   Lab Results   Component Value Date    HGB 9.7 (L) 04/26/2024    HCT 29.4 (L) 04/26/2024     Monitor serial CBC and transfuse if patient becomes hemodynamically unstable, symptomatic or H/H drops  below 7/21.    This is new finding, had normal H&H 6 months ago  Iron-deficiency, CEA negative        Hyponatremia  Patient has hyponatremia which is controlled,We will aim to correct the sodium by 4-6mEq in 24 hours. We will monitor sodium Daily. The hyponatremia is due to Dehydration/hypovolemia. We will treat the hyponatremia with IV fluids as follows: NS at 75 ml/hr. The patient's sodium results have been reviewed and are listed below.  Recent Labs   Lab 04/26/24  0429   *     We will continue to trend    Bilateral pleural effusion  Patient found to have moderate pleural effusion on imaging. I have personally reviewed and interpreted the following imaging: Xray and CT. A thoracentesis was deferred pending pulmonology consultation.  Most likely etiology includes  possible malignancy with new lung mass discovered on CT scan, and pneumonia    Holding off on diuresis at this time, as he appears dry  Patient given IV fluids reassess in a.m.      BPH (benign prostatic hyperplasia)  Continue finasteride    Patient with acute urinary retention requiring Queen catheter placement    Chronic diastolic congestive heart failure  Patient is identified as having Diastolic (HFpEF) heart failure that is Chronic. CHF is currently controlled. Latest ECHO performed and demonstrates- No results found for this or any previous visit.  Monitor clinical status closely. Monitor on telemetry. Patient is off CHF pathway.  Monitor strict Is&Os and daily weights.  Fluid restriction not indicated at this time. Cardiology has not been consulted. Continue to stress to patient importance of self efficacy and  on diet for CHF. Last BNP reviewed- and noted below   Recent Labs   Lab 04/25/24  0026   *       Holding home Lasix 20 mg daily at this time as patient appears dry on exam    COPD exacerbation  Patient's COPD is with exacerbation noted by continued dyspnea and worsening of baseline hypoxia currently.  Patient is  currently off COPD Pathway. Continue scheduled inhalers Antibiotics and Supplemental oxygen and monitor respiratory status closely.   Was given 1 dose of IV Solu-Medrol with significant improvement, we will hold off on additional steroids at this time, add back PRN  Nebs ordered every 6 hours    Primary hypertension  Chronic, controlled. Latest blood pressure and vitals reviewed-     Temp:  [97.6 °F (36.4 °C)-98.5 °F (36.9 °C)]   Pulse:  []   Resp:  [16-20]   BP: (128-149)/(60-71)   SpO2:  [93 %-98 %] .   Home meds for hypertension were reviewed and noted below.   Hypertension Medications               furosemide (LASIX) 20 MG tablet Take 20 mg by mouth.    hydrALAZINE (APRESOLINE) 50 MG tablet Take 50 mg by mouth 2 (two) times daily.            While in the hospital, will manage blood pressure as follows; trend    Will utilize p.r.n. blood pressure medication only if patient's blood pressure greater than 180/110 and he develops symptoms such as worsening chest pain or shortness of breath.    Pleuritic chest pain  Likely related to pneumonia/pleural effusions and coughing  Initial troponin is normal, we will trend  EKG reviewed, sinus tach with first-degree AV block and right bundle branch block, no concerning ST or T-wave changes  Cardiac monitoring    Questionably related to distended abdomen some improvement      Pneumonia  We will treat for pneumonia with Rocephin and Zithromax at this time  Follow-up blood cultures  Sputum culture ordered, patient does have productive sounding cough however difficult to bring up secretions  Monitor chest x-ray      Right lower lobe lung mass  Mass in the right lower lobe in the roseanne fissural region measures 6.2 by 10 cm worrisome for malignancy  Pulmonology consulted  Uncertain as his last CT scan in September was negative we will continue to follow      VTE Risk Mitigation (From admission, onward)           Ordered     Reason for No Pharmacological VTE Prophylaxis  Once         Comments: Possible procedure   Question:  Reasons:  Answer:  Physician Provided (leave comment)    04/23/24 0030     IP VTE HIGH RISK PATIENT  Once         04/23/24 0030     Place sequential compression device  Until discontinued         04/23/24 0030                    Discharge Planning   ERIKA:      Code Status: Full Code   Is the patient medically ready for discharge?:     Reason for patient still in hospital (select all that apply): Patient trending condition, Treatment, Consult recommendations, PT / OT recommendations, and Pending disposition  Discharge Plan A: Home with family, Home Health                  Gisella Callejas MD  Department of Hospital Medicine   [facility] - [facility]

## 2024-04-26 NOTE — PROGRESS NOTES
[facility]  Pulmonology Progress Note    Patient Name: Jason Mayfield III  MRN: [id number]  Admission Date: 4/22/2024  Hospital Length of Stay: 4 days  Code Status: Full Code   Attending Provider: Gisella Aguilar MD    Subjective:   History of present illness:  Jason Mayfield is a 83-year-old male with a past medical history significant for hypertension, NSTEMI, chronic diastolic heart failure, COPD / emphysema, chronic hypoxic respiratory failure on home oxygen of 2L/NC, BPH, hypothyroidism, AAA, chronic back pain, neuropathy, daily alcohol use, and former tobacco abuse who presented for evaluation of left lower chest pain and shortness of breath. Patient and family endorses a dry non-productive cough which had been ongoing for approximately 5days. Denies fever / chills or diarrhea at home. Endorses constipation and reports no bowel movement for almost two weeks. On exam, some mild LUQ tenderness with palpation; however, no guarding noted. Abdomen firm to touch with faint minimal bowel sounds. Due to his severe pain, the patient reports difficulty with taking deep breaths. Pain exacerbated by cough / deep inspiration. CTA chest completed at time of presentation demonstrates extensive emphysematous changes, bilateral pleural effusions, and RLL consolidation / mass. Patient was admitted by hospital medicine and pulmonary consulted for evaluation.     Of note, patient is followed by Dr. Gant with [facility] pulmonology. 60-pack year smoking history. Previous CT imaging in September 2023 with marked emphysematous changes and some bronchial wall thickening. No evidence of mass / consolidation / nodules on imaging. Trace bilateral effusions noted. Patient is a resident in [place], [place] with reports of 3 cats and 2 dogs in their home.     Patient recently seen by Dr. Gant in March 2023 who felt his shortness of breath was multifactorial. Patient is noted to have moderate  "impairment on his pulmonary testing and felt his conditions were exacerbated by his underlying diastolic heart failure. In addition, it was felt a significant portion of his SOB was likely contributed to physical deconditioning as he demonstrates activity intolerance with an inability to walk greater than 100ft without worsening dyspnea.    Interval history, f/u chief complaint shortness of breath:  4/24: patient attempted repositioning himself in bed with notable increased work of breathing with associated wheezing noted on exam. Patient transitioned to Vapotherm 25/0.50 this morning and given IV Solu-Medrol with nebulizer treatment. Patient pulmonary status improved. Significant abdominal distention which is likely contributing to his underlying shortness of breath. Indicates some stool production yesterday; however, sounds minimal in nature in comparison to his abdominal imaging.   4/25: sedated at time of my exam; patient with improved air movement throughout on exam this morning; currently on Vapotherm 25/0.50 this morning. Patient became extremely anxious, tachypneic, and mildly tachycardic overnight. RT attempted to give scheduled neb tx, but patient had an episode of vomiting. Case and plan of care discussed with son at bedside this morning.   4/26: Patient reports he feels "terrible" this morning; complaints are vague in nature. Endorses generalized weakness. Pulmonary status significantly improved. On Vapotherm 15/0.35. Abdomen significantly softer from previous exam. By reports, several large size bowel movements yesterday afternoon.     Objective:     Vital Signs (Most Recent):  Temp: 98.5 °F (36.9 °C) (04/26/24 1225)  Pulse: 76 (04/26/24 1230)  Resp: 18 (04/26/24 1230)  BP: (!) 149/66 (04/26/24 1225)  SpO2: 95 % (04/26/24 1230) Vital Signs (24h Range):  Temp:  [97.6 °F (36.4 °C)-98.5 °F (36.9 °C)] 98.5 °F (36.9 °C)  Pulse:  [] 76  Resp:  [16-20] 18  SpO2:  [93 %-98 %] 95 %  BP: (128-193)/(60-85) " 149/66   Weight: 82 kg (180 lb 12.8 oz);  Body mass index is 25.22 kg/m².    Intake/Output Summary (Last 24 hours) at 4/26/2024 1341  Last data filed at 4/26/2024 0434  Gross per 24 hour   Intake --   Output 2260 ml   Net -2260 ml      Physical Exam  Vitals and nursing note reviewed.   HENT:      Head: Normocephalic.   Eyes:      Conjunctiva/sclera: Conjunctivae normal.   Cardiovascular:      Rate and Rhythm: Normal rate.   Pulmonary:      Effort: Pulmonary effort is normal.      Breath sounds: Normal breath sounds.   Abdominal:      Palpations: Abdomen is soft.   Musculoskeletal:         General: Normal range of motion.      Cervical back: Normal range of motion.   Skin:     General: Skin is warm and dry.   Neurological:      General: No focal deficit present.      Mental Status: He is alert.   Psychiatric:         Mood and Affect: Mood normal.       Review of Systems: Negative except as indicated in HPI    Significant Labs:  CBC/Anemia Profile:  Recent Labs   Lab 04/25/24  0548 04/26/24  0429   WBC 20.89* 18.65*   HGB 10.1* 9.7*   HCT 29.6* 29.4*    345   MCV 93 95   RDW 12.5 12.8   Chemistries:  Recent Labs   Lab 04/25/24  0548 04/26/24  0429   * 133*   K 5.2* 4.7   CL 94* 98   CO2 31* 31*   BUN 21 18   CREATININE 0.7 0.7   CALCIUM 9.0 9.0   MG 3.8* 2.7*     ABG  Recent Labs   Lab 04/25/24  0036   PH 7.345*   PO2 84   PCO2 53.7*   HCO3 29.3*   BE 4*     Assessment/Plan:   Chronic respiratory failure with hypoxia  Bilateral pleural effusion  Pneumonia  COPD exacerbation  Pleuritic chest pain  Patient with chronic hypoxic respiratory failure on home oxygen of 2L/NC.     CT chest imaging in September 2023 with marked emphysematous changes and some bronchial wall thickening. No evidence of mass / consolidation / nodules on imaging. Trace bilateral effusions noted   Repeat CT chest yesterday reviewed in comparison to prior imaging. Low suspicion for underlying malignancy in light of no evidence of prior  mass, lesion, nodule. No mediastinal lymphadenopathy  Sputum culture with Candida Albicans  Differential diagnosis: decompensated heart failure, acute infection, malignancy, aspiration  Legionella negative   Fungitell and fungal immuno studies pending  Complete Rocephin and Azith course  Follow chest imaging as needed  Follow fever and WBC trends  Work of breathing and wheezing significantly improved this am with no acute findings  Weaning Vapotherm, currently on 15/0.35  Continue supplemental oxygen as needed; titrate to  maintain sats 90% or greater  D/C IV steroids; begin Prednisone taper tomorrow  CT abdomen with significant constipation which I suspect is complicating the patient's shortness of breath due to diaphragmatic impairment  Mobilize as able; OOB in chair  Close pulmonary follow-up upon discharge. May ultimately need bronchoscopy and further work-up of lung mass. Repeat imaging for clearance / improvement in 8-12 weeks optimal prior to further work-up.    Right lower lobe lung mass  CT chest imaging in September 2023 with marked emphysematous changes and some bronchial wall thickening. No evidence of mass / consolidation / nodules on imaging. Trace bilateral effusions noted   Repeat CT chest yesterday reviewed in comparison to prior imaging. Low suspicion for underlying malignancy in light of no evidence of prior mass, lesion, nodule. No mediastinal lymphadenopathy  Sputum culture NGTD; follow   Differential diagnosis: decompensated heart failure, acute infection, malignancy, aspiration  Legionella, fungitell and fungal immuno studies pending  Continue Rocephin and Azith for now  Follow cultures and adjust antibiotics as appropriate  Follow chest imaging as needed  Follow fever and WBC trends     Deandra Hunt NP  Pulmonary and Critical Care  [facility]

## 2024-04-26 NOTE — SUBJECTIVE & OBJECTIVE
Interval History:  Patient seen and examined at bedside.  He says he just feels blah.  Less shortness of breath, less abdominal distention.    Review of Systems   Constitutional:  Positive for activity change, appetite change and fatigue.   Respiratory:  Positive for shortness of breath. Negative for chest tightness and wheezing.    Cardiovascular:  Negative for chest pain, palpitations and leg swelling.   Gastrointestinal:  Positive for abdominal pain and constipation.   Musculoskeletal:  Positive for arthralgias.   Neurological:  Positive for weakness.   All other systems reviewed and are negative.    Objective:     Vital Signs (Most Recent):  Temp: 98.5 °F (36.9 °C) (04/26/24 1225)  Pulse: 87 (04/26/24 1507)  Resp: 18 (04/26/24 1230)  BP: (!) 149/66 (04/26/24 1225)  SpO2: 95 % (04/26/24 1230) Vital Signs (24h Range):  Temp:  [97.6 °F (36.4 °C)-98.5 °F (36.9 °C)] 98.5 °F (36.9 °C)  Pulse:  [] 87  Resp:  [16-20] 18  SpO2:  [93 %-98 %] 95 %  BP: (128-149)/(60-71) 149/66     Weight: 82 kg (180 lb 12.8 oz)  Body mass index is 25.22 kg/m².    Intake/Output Summary (Last 24 hours) at 4/26/2024 1634  Last data filed at 4/26/2024 0434  Gross per 24 hour   Intake --   Output 1160 ml   Net -1160 ml         Physical Exam  Vitals reviewed.   Constitutional:       Appearance: He is ill-appearing.   HENT:      Head: Normocephalic and atraumatic.      Mouth/Throat:      Mouth: Mucous membranes are moist.      Pharynx: Oropharynx is clear.   Eyes:      Extraocular Movements: Extraocular movements intact.      Conjunctiva/sclera: Conjunctivae normal.   Cardiovascular:      Rate and Rhythm: Regular rhythm. Tachycardia present.      Pulses: Normal pulses.      Heart sounds: Normal heart sounds.   Pulmonary:      Effort: Pulmonary effort is normal.      Breath sounds: Rhonchi present.   Abdominal:      General: Bowel sounds are normal. There is distension.      Tenderness: There is no abdominal tenderness. There is no  guarding or rebound.   Musculoskeletal:         General: Normal range of motion.      Cervical back: Normal range of motion and neck supple.   Skin:     General: Skin is warm and dry.   Neurological:      Mental Status: He is alert and oriented to person, place, and time. Mental status is at baseline.      Motor: Weakness present.   Psychiatric:         Mood and Affect: Mood normal.         Behavior: Behavior normal.         Thought Content: Thought content normal.             Significant Labs: All pertinent labs within the past 24 hours have been reviewed.  CBC:   Recent Labs   Lab 04/25/24  0548 04/26/24  0429   WBC 20.89* 18.65*   HGB 10.1* 9.7*   HCT 29.6* 29.4*    345     CMP:   Recent Labs   Lab 04/25/24  0548 04/26/24  0429   * 133*   K 5.2* 4.7   CL 94* 98   CO2 31* 31*   * 165*   BUN 21 18   CREATININE 0.7 0.7   CALCIUM 9.0 9.0   ANIONGAP 4* 4*       Significant Imaging: I have reviewed all pertinent imaging results/findings within the past 24 hours.

## 2024-04-26 NOTE — ASSESSMENT & PLAN NOTE
Patient with Hypoxic Respiratory failure which is Acute on chronic.  he is on home oxygen at 2 LPM. Supplemental oxygen was provided and noted- Oxygen Concentration (%):  [35-50] 35    .   Signs/symptoms of respiratory failure include- tachypnea, increased work of breathing, use of accessory muscles, and wheezing. Contributing diagnoses includes - COPD, Pleural effusion, Pneumonia, and lung mass  Labs and images were reviewed. Patient Has not had a recent ABG. Will treat underlying causes and adjust management of respiratory failure as follows-     Was given Solu-medrol x one dose per ED, with increased shortness breath and wheezing Solu-Medrol as we will do every 6 hours in addition to nebs.  Supplemental oxygen has been increased to Vapotherm at 10/50.      Continue supplemental oxygen.  We will treat with antibiotics, nebs, pulmonary hygiene

## 2024-04-26 NOTE — SUBJECTIVE & OBJECTIVE
Jason Mayfield is a 83-year-old male with a past medical history significant for hypertension, NSTEMI, chronic diastolic heart failure, COPD / emphysema, chronic hypoxic respiratory failure on home oxygen of 2L/NC, BPH, hypothyroidism, AAA, chronic back pain, neuropathy, daily alcohol use, and former tobacco abuse who presented for evaluation of left lower chest pain and shortness of breath. Patient and family endorses a dry non-productive cough which had been ongoing for approximately 5days. Denies fever / chills or diarrhea at home. Endorses constipation and reports no bowel movement for almost two weeks. On exam, some mild LUQ tenderness with palpation; however, no guarding noted. Abdomen firm to touch with faint minimal bowel sounds. Due to his severe pain, the patient reports difficulty with taking deep breaths. Pain exacerbated by cough / deep inspiration. CTA chest completed at time of presentation demonstrates extensive emphysematous changes, bilateral pleural effusions, and RLL consolidation / mass. Patient was admitted by hospital medicine and pulmonary consulted for evaluation.     Of note, patient is followed by Dr. Gant with [facility] pulmonology. 60-pack year smoking history. Previous CT imaging in September 2023 with marked emphysematous changes and some bronchial wall thickening. No evidence of mass / consolidation / nodules on imaging. Trace bilateral effusions noted. Patient is a resident in [place], [place] with reports of 3 cats and 2 dogs in their home.     Patient recently seen by Dr. Gant in March 2023 who felt his shortness of breath was multifactorial. Patient is noted to have moderate impairment on his pulmonary testing and felt his conditions were exacerbated by his underlying diastolic heart failure. In addition, it was felt a significant portion of his SOB was likely contributed to physical deconditioning as he demonstrates activity intolerance with an inability to walk greater  "than 100ft without worsening dyspnea.    Interval history, f/u chief complaint shortness of breath:  4/24: patient attempted repositioning himself in bed with notable increased work of breathing with associated wheezing noted on exam. Patient transitioned to Vapotherm 25/0.50 this morning and given IV Solu-Medrol with nebulizer treatment. Patient pulmonary status improved. Significant abdominal distention which is likely contributing to his underlying shortness of breath. Indicates some stool production yesterday; however, sounds minimal in nature in comparison to his abdominal imaging.   4/25: sedated at time of my exam; patient with improved air movement throughout on exam this morning; currently on Vapotherm 25/0.50 this morning. Patient became extremely anxious, tachypneic, and mildly tachycardic overnight. RT attempted to give scheduled neb tx, but patient had an episode of vomiting. Case and plan of care discussed with son at bedside this morning.   4/26: Patient reports he feels "terrible" this morning; complaints are vague in nature. Endorses generalized weakness. Pulmonary status significantly improved. On Vapotherm 15/0.35. Abdomen significantly softer from previous exam. By reports, several large size bowel movements yesterday afternoon.     Objective:     Vital Signs (Most Recent):  Temp: 98.5 °F (36.9 °C) (04/26/24 1225)  Pulse: 76 (04/26/24 1230)  Resp: 18 (04/26/24 1230)  BP: (!) 149/66 (04/26/24 1225)  SpO2: 95 % (04/26/24 1230) Vital Signs (24h Range):  Temp:  [97.6 °F (36.4 °C)-98.5 °F (36.9 °C)] 98.5 °F (36.9 °C)  Pulse:  [] 76  Resp:  [16-20] 18  SpO2:  [93 %-98 %] 95 %  BP: (128-193)/(60-85) 149/66   Weight: 82 kg (180 lb 12.8 oz);  Body mass index is 25.22 kg/m².    Intake/Output Summary (Last 24 hours) at 4/26/2024 1341  Last data filed at 4/26/2024 0434  Gross per 24 hour   Intake --   Output 2260 ml   Net -2260 ml      Physical Exam  Vitals and nursing note reviewed.   HENT:      Head: " Normocephalic.   Eyes:      Conjunctiva/sclera: Conjunctivae normal.   Cardiovascular:      Rate and Rhythm: Normal rate.   Pulmonary:      Effort: Pulmonary effort is normal.      Breath sounds: Normal breath sounds.   Abdominal:      Palpations: Abdomen is soft.   Musculoskeletal:         General: Normal range of motion.      Cervical back: Normal range of motion.   Skin:     General: Skin is warm and dry.   Neurological:      General: No focal deficit present.      Mental Status: He is alert.   Psychiatric:         Mood and Affect: Mood normal.         Review of Systems: Negative except as indicated in HPI    Significant Labs:  CBC/Anemia Profile:  Recent Labs   Lab 04/25/24  0548 04/26/24  0429   WBC 20.89* 18.65*   HGB 10.1* 9.7*   HCT 29.6* 29.4*    345   MCV 93 95   RDW 12.5 12.8   Chemistries:  Recent Labs   Lab 04/25/24  0548 04/26/24  0429   * 133*   K 5.2* 4.7   CL 94* 98   CO2 31* 31*   BUN 21 18   CREATININE 0.7 0.7   CALCIUM 9.0 9.0   MG 3.8* 2.7*

## 2024-04-26 NOTE — ASSESSMENT & PLAN NOTE
Patient has hyponatremia which is controlled,We will aim to correct the sodium by 4-6mEq in 24 hours. We will monitor sodium Daily. The hyponatremia is due to Dehydration/hypovolemia. We will treat the hyponatremia with IV fluids as follows: NS at 75 ml/hr. The patient's sodium results have been reviewed and are listed below.  Recent Labs   Lab 04/26/24  0429   *     We will continue to trend

## 2024-04-26 NOTE — ASSESSMENT & PLAN NOTE
Chronic, controlled. Latest blood pressure and vitals reviewed-     Temp:  [97.6 °F (36.4 °C)-98.5 °F (36.9 °C)]   Pulse:  []   Resp:  [16-20]   BP: (128-149)/(60-71)   SpO2:  [93 %-98 %] .   Home meds for hypertension were reviewed and noted below.   Hypertension Medications               furosemide (LASIX) 20 MG tablet Take 20 mg by mouth.    hydrALAZINE (APRESOLINE) 50 MG tablet Take 50 mg by mouth 2 (two) times daily.            While in the hospital, will manage blood pressure as follows; trend    Will utilize p.r.n. blood pressure medication only if patient's blood pressure greater than 180/110 and he develops symptoms such as worsening chest pain or shortness of breath.

## 2024-04-26 NOTE — PT/OT/SLP PROGRESS
"Occupational Therapy   Treatment    Name: Jason Mayfield III  MRN: [id number]  Admitting Diagnosis:  Chronic respiratory failure with hypoxia       Recommendations:     Discharge Recommendations: Moderate Intensity Therapy  Discharge Equipment Recommendations:  to be determined by next level of care  Barriers to discharge:  None    Assessment:     Jason Mayfield III is a 83 y.o. male with a medical diagnosis of Chronic respiratory failure with hypoxia.  He presents with the following performance deficits affecting function are weakness, impaired endurance, impaired self care skills, impaired functional mobility, gait instability, impaired balance, decreased lower extremity function, decreased safety awareness, pain, impaired cardiopulmonary response to activity.     Rehab Prognosis:  Good; patient would benefit from acute skilled OT services to address these deficits and reach maximum level of function.       Plan:     Patient to be seen 2 x/week to address the above listed problems via self-care/home management, therapeutic activities, therapeutic exercises  Plan of Care Expires: 05/08/24  Plan of Care Reviewed with: patient    Subjective     Chief Complaint: "I feel lousy.", SOB  Patient/Family Comments/goals: pt requesting to use BSC. Return to PLOF.  Pain/Comfort:  Pain Rating 1: 7/10  Location - Side 1: Right  Location - Orientation 1: generalized  Location 1: foot  Pain Addressed 1: Reposition, Distraction  Pain Rating Post-Intervention 1: 7/10    Objective:     Communicated with: Nurse Holbrook and epic chart review prior to session.  Patient found HOB elevated with telemetry, peripheral IV, anderson catheter, oxygen, Other (comments) (vapotherm) upon OT entry to room.    General Precautions: Standard, fall, respiratory    Orthopedic Precautions:N/A  Braces: N/A  Respiratory Status:  VAPOTHERM 15L, 35%     Occupational Performance:     Bed Mobility:    Patient completed Rolling/Turning to Right with minimum " assistance  Patient completed Supine to Sit with minimum assistance  Patient completed Sit to Supine with minimum assistance   Forward scoot to EOB with Min A.    Functional Mobility/Transfers:  Patient completed Sit <> Stand Transfer with minimum assistance  with  rolling walker   Patient completed Toilet Transfer Stand Pivot technique with moderate assistance with  rolling walker and bedside commode    Activities of Daily Living:  Lower Body Dressing: total assistance rafael socks  Toileting: maximal assistance required assist for cleaning and clothing management. Pt with liquid BM.    [facility] 6 Click ADL: 15    Treatment & Education:  Pt with increased SOB with exertion; educated pt on pursed lip breathing technique and importance of activity pacing. Pt fatiguing quickly. Reviewed role of OT in acute setting and benefits of participation. Educated on techniques to use to increase independence and decrease fall risk with functional transfers. Educated on importance of OOB activity and calling for A to transfer and meet needs. Encouraged completion of B UE AROM therex throughout the day to tolerance to increase functional strength and activity tolerance. Educated patient on importance of increased tolerance to upright position and direct impact on CV endurance and strength. Patient encouraged to sit up EOB/with bed in chair position for a minimum of 2 consecutive hours per day and for meals. Patient stated understanding and in agreement with POC.     Patient left HOB elevated with all lines intact, call button in reach, nurse notified, and visitor present    GOALS:   Multidisciplinary Problems       Occupational Therapy Goals          Problem: Occupational Therapy    Goal Priority Disciplines Outcome Interventions   Occupational Therapy Goal     OT, PT/OT Progressing    Description: Goals to be met by: 5/8/24     Patient will increase functional independence with ADLs by performing:    UE Dressing with Modified  Ralls.  Grooming while standing at sink with Modified Ralls.  Toileting from toilet with Modified Ralls for hygiene and clothing management.   Toilet transfer to toilet with Contact Guard Assistance.  Upper extremity exercise program x15 reps per handout, with independence.                         Time Tracking:     OT Date of Treatment: 04/26/24  OT Start Time: 1105  OT Stop Time: 1130  OT Total Time (min): 25 min    Billable Minutes:Self Care/Home Management 10  Therapeutic Activity 15    OT/DONYA: GEORGE Meng OT     4/26/2024

## 2024-04-27 LAB
ALBUMIN SERPL BCP-MCNC: 2.6 G/DL (ref 3.5–5.2)
ALP SERPL-CCNC: 82 U/L (ref 55–135)
ALT SERPL W/O P-5'-P-CCNC: 117 U/L (ref 10–44)
ANION GAP SERPL CALC-SCNC: 7 MMOL/L (ref 8–16)
AST SERPL-CCNC: 83 U/L (ref 10–40)
BASOPHILS # BLD AUTO: 0.1 K/UL (ref 0–0.2)
BASOPHILS NFR BLD: 0.5 % (ref 0–1.9)
BILIRUB SERPL-MCNC: 0.3 MG/DL (ref 0.1–1)
BUN SERPL-MCNC: 16 MG/DL (ref 8–23)
CALCIUM SERPL-MCNC: 9.5 MG/DL (ref 8.7–10.5)
CHLORIDE SERPL-SCNC: 101 MMOL/L (ref 95–110)
CO2 SERPL-SCNC: 29 MMOL/L (ref 23–29)
CREAT SERPL-MCNC: 0.6 MG/DL (ref 0.5–1.4)
DIFFERENTIAL METHOD BLD: ABNORMAL
EOSINOPHIL # BLD AUTO: 0 K/UL (ref 0–0.5)
EOSINOPHIL NFR BLD: 0.1 % (ref 0–8)
ERYTHROCYTE [DISTWIDTH] IN BLOOD BY AUTOMATED COUNT: 12.9 % (ref 11.5–14.5)
EST. GFR  (NO RACE VARIABLE): >60 ML/MIN/1.73 M^2
GLUCOSE SERPL-MCNC: 100 MG/DL (ref 70–110)
HCT VFR BLD AUTO: 31.7 % (ref 40–54)
HGB BLD-MCNC: 10.4 G/DL (ref 14–18)
IMM GRANULOCYTES # BLD AUTO: 0.78 K/UL (ref 0–0.04)
IMM GRANULOCYTES NFR BLD AUTO: 4.2 % (ref 0–0.5)
LYMPHOCYTES # BLD AUTO: 2.6 K/UL (ref 1–4.8)
LYMPHOCYTES NFR BLD: 13.9 % (ref 18–48)
MAGNESIUM SERPL-MCNC: 2.1 MG/DL (ref 1.6–2.6)
MCH RBC QN AUTO: 31.2 PG (ref 27–31)
MCHC RBC AUTO-ENTMCNC: 32.8 G/DL (ref 32–36)
MCV RBC AUTO: 95 FL (ref 82–98)
MONOCYTES # BLD AUTO: 2 K/UL (ref 0.3–1)
MONOCYTES NFR BLD: 10.9 % (ref 4–15)
NEUTROPHILS # BLD AUTO: 13.1 K/UL (ref 1.8–7.7)
NEUTROPHILS NFR BLD: 70.4 % (ref 38–73)
NRBC BLD-RTO: 0 /100 WBC
PLATELET # BLD AUTO: 369 K/UL (ref 150–450)
PMV BLD AUTO: 9.2 FL (ref 9.2–12.9)
POTASSIUM SERPL-SCNC: 3.8 MMOL/L (ref 3.5–5.1)
PROT SERPL-MCNC: 6.1 G/DL (ref 6–8.4)
RBC # BLD AUTO: 3.33 M/UL (ref 4.6–6.2)
SODIUM SERPL-SCNC: 137 MMOL/L (ref 136–145)
WBC # BLD AUTO: 18.61 K/UL (ref 3.9–12.7)

## 2024-04-27 PROCEDURE — 85025 COMPLETE CBC W/AUTO DIFF WBC: CPT | Performed by: NURSE PRACTITIONER

## 2024-04-27 PROCEDURE — 94761 N-INVAS EAR/PLS OXIMETRY MLT: CPT

## 2024-04-27 PROCEDURE — 11000001 HC ACUTE MED/SURG PRIVATE ROOM

## 2024-04-27 PROCEDURE — 80053 COMPREHEN METABOLIC PANEL: CPT | Performed by: INTERNAL MEDICINE

## 2024-04-27 PROCEDURE — 36415 COLL VENOUS BLD VENIPUNCTURE: CPT | Performed by: NURSE PRACTITIONER

## 2024-04-27 PROCEDURE — 27000646 HC AEROBIKA DEVICE

## 2024-04-27 PROCEDURE — 25000003 PHARM REV CODE 250: Performed by: INTERNAL MEDICINE

## 2024-04-27 PROCEDURE — 94640 AIRWAY INHALATION TREATMENT: CPT

## 2024-04-27 PROCEDURE — 25000242 PHARM REV CODE 250 ALT 637 W/ HCPCS: Performed by: INTERNAL MEDICINE

## 2024-04-27 PROCEDURE — 99900035 HC TECH TIME PER 15 MIN (STAT)

## 2024-04-27 PROCEDURE — 27100171 HC OXYGEN HIGH FLOW UP TO 24 HOURS

## 2024-04-27 PROCEDURE — 83735 ASSAY OF MAGNESIUM: CPT | Performed by: NURSE PRACTITIONER

## 2024-04-27 PROCEDURE — 25000003 PHARM REV CODE 250: Performed by: NURSE PRACTITIONER

## 2024-04-27 PROCEDURE — 63600175 PHARM REV CODE 636 W HCPCS: Performed by: NURSE PRACTITIONER

## 2024-04-27 PROCEDURE — 63700000 PHARM REV CODE 250 ALT 637 W/O HCPCS: Performed by: NURSE PRACTITIONER

## 2024-04-27 PROCEDURE — 94664 DEMO&/EVAL PT USE INHALER: CPT

## 2024-04-27 PROCEDURE — 63700000 PHARM REV CODE 250 ALT 637 W/O HCPCS: Performed by: INTERNAL MEDICINE

## 2024-04-27 PROCEDURE — 63600175 PHARM REV CODE 636 W HCPCS: Performed by: INTERNAL MEDICINE

## 2024-04-27 RX ORDER — FLUCONAZOLE 100 MG/1
100 TABLET ORAL DAILY
Status: DISCONTINUED | OUTPATIENT
Start: 2024-04-28 | End: 2024-04-29

## 2024-04-27 RX ORDER — GUAIFENESIN 600 MG/1
600 TABLET, EXTENDED RELEASE ORAL 2 TIMES DAILY
Status: DISCONTINUED | OUTPATIENT
Start: 2024-04-27 | End: 2024-05-02 | Stop reason: HOSPADM

## 2024-04-27 RX ORDER — METOPROLOL TARTRATE 25 MG/1
25 TABLET, FILM COATED ORAL 2 TIMES DAILY
Status: DISCONTINUED | OUTPATIENT
Start: 2024-04-27 | End: 2024-05-02 | Stop reason: HOSPADM

## 2024-04-27 RX ORDER — ENOXAPARIN SODIUM 100 MG/ML
40 INJECTION SUBCUTANEOUS EVERY 24 HOURS
Status: DISCONTINUED | OUTPATIENT
Start: 2024-04-27 | End: 2024-05-02 | Stop reason: HOSPADM

## 2024-04-27 RX ORDER — FLUCONAZOLE 150 MG/1
300 TABLET ORAL ONCE
Status: COMPLETED | OUTPATIENT
Start: 2024-04-27 | End: 2024-04-27

## 2024-04-27 RX ADMIN — AZITHROMYCIN DIHYDRATE 500 MG: 250 TABLET ORAL at 08:04

## 2024-04-27 RX ADMIN — FLUCONAZOLE 100 MG: 100 TABLET ORAL at 08:04

## 2024-04-27 RX ADMIN — BUDESONIDE INHALATION 0.5 MG: 0.5 SUSPENSION RESPIRATORY (INHALATION) at 08:04

## 2024-04-27 RX ADMIN — LACTULOSE 20 G: 20 SOLUTION ORAL at 08:04

## 2024-04-27 RX ADMIN — SENNOSIDES AND DOCUSATE SODIUM 2 TABLET: 50; 8.6 TABLET ORAL at 08:04

## 2024-04-27 RX ADMIN — ACETAMINOPHEN 650 MG: 325 TABLET ORAL at 12:04

## 2024-04-27 RX ADMIN — METOPROLOL TARTRATE 25 MG: 25 TABLET, FILM COATED ORAL at 12:04

## 2024-04-27 RX ADMIN — ALBUTEROL SULFATE 2.5 MG: 2.5 SOLUTION RESPIRATORY (INHALATION) at 12:04

## 2024-04-27 RX ADMIN — BUDESONIDE INHALATION 0.5 MG: 0.5 SUSPENSION RESPIRATORY (INHALATION) at 07:04

## 2024-04-27 RX ADMIN — CEFTRIAXONE 2 G: 2 INJECTION, POWDER, FOR SOLUTION INTRAMUSCULAR; INTRAVENOUS at 06:04

## 2024-04-27 RX ADMIN — FLUCONAZOLE 300 MG: 150 TABLET ORAL at 12:04

## 2024-04-27 RX ADMIN — GABAPENTIN 1200 MG: 400 CAPSULE ORAL at 08:04

## 2024-04-27 RX ADMIN — ALBUTEROL SULFATE 2.5 MG: 2.5 SOLUTION RESPIRATORY (INHALATION) at 08:04

## 2024-04-27 RX ADMIN — ENOXAPARIN SODIUM 40 MG: 40 INJECTION SUBCUTANEOUS at 06:04

## 2024-04-27 RX ADMIN — GUAIFENESIN 600 MG: 600 TABLET, EXTENDED RELEASE ORAL at 08:04

## 2024-04-27 RX ADMIN — METOPROLOL TARTRATE 25 MG: 25 TABLET, FILM COATED ORAL at 08:04

## 2024-04-27 RX ADMIN — FINASTERIDE 5 MG: 5 TABLET, FILM COATED ORAL at 08:04

## 2024-04-27 RX ADMIN — HYDRALAZINE HYDROCHLORIDE 50 MG: 25 TABLET, FILM COATED ORAL at 08:04

## 2024-04-27 RX ADMIN — BISACODYL 10 MG: 10 SUPPOSITORY RECTAL at 08:04

## 2024-04-27 RX ADMIN — PREDNISONE 40 MG: 20 TABLET ORAL at 08:04

## 2024-04-27 RX ADMIN — GABAPENTIN 1200 MG: 400 CAPSULE ORAL at 02:04

## 2024-04-27 RX ADMIN — TRAMADOL HYDROCHLORIDE 50 MG: 50 TABLET, COATED ORAL at 03:04

## 2024-04-27 RX ADMIN — ALBUTEROL SULFATE 2.5 MG: 2.5 SOLUTION RESPIRATORY (INHALATION) at 07:04

## 2024-04-27 RX ADMIN — POLYETHYLENE GLYCOL 3350 17 G: 17 POWDER, FOR SOLUTION ORAL at 08:04

## 2024-04-27 RX ADMIN — GUAIFENESIN 600 MG: 600 TABLET, EXTENDED RELEASE ORAL at 09:04

## 2024-04-27 RX ADMIN — ALBUTEROL SULFATE 2.5 MG: 2.5 SOLUTION RESPIRATORY (INHALATION) at 04:04

## 2024-04-27 NOTE — PROGRESS NOTES
Pharmacist Renal Dose Adjustment Note    Jason Mayfield III is a 83 y.o. male being treated with the medication fluconazole    Patient Data:    Vital Signs (Most Recent):  Temp: 97.7 °F (36.5 °C) (04/27/24 0805)  Pulse: 79 (04/27/24 0849)  Resp: 18 (04/27/24 0849)  BP: (!) 177/98 (04/27/24 0805)  SpO2: (!) 94 % (04/27/24 0849) Vital Signs (72h Range):  Temp:  [97.5 °F (36.4 °C)-98.6 °F (37 °C)]   Pulse:  []   Resp:  [16-26]   BP: (126-201)/(60-98)   SpO2:  [91 %-99 %]      Recent Labs   Lab 04/25/24  0548 04/26/24  0429 04/27/24  0542   CREATININE 0.7 0.7 0.6     Serum creatinine: 0.6 mg/dL 04/27/24 0542  Estimated creatinine clearance: 95.9 mL/min    Medication: fluconazole dose: 100 mg frequency daily will be changed to medication: fluconazole dose:400 mg frequency: daily, for respiratory infection and CrCl > 50 mL/min    Pharmacist's Name: Kaylene Galdamez

## 2024-04-27 NOTE — SUBJECTIVE & OBJECTIVE
Interval History:  Patient seen and examined at bedside with wife present.  He would episode of confusion last p.m..  Today he was back to baseline mental status.  Reports pleuritic chest pain has completely resolved.  He was out of bed to use bedside commode with assistance.    Review of Systems   Constitutional:  Positive for activity change, appetite change and fatigue.   Respiratory:  Positive for shortness of breath. Negative for chest tightness and wheezing.    Cardiovascular:  Negative for chest pain, palpitations and leg swelling.   Gastrointestinal:  Positive for abdominal pain (Improving).   Genitourinary:  Positive for difficulty urinating.   Musculoskeletal:  Positive for arthralgias.   Neurological:  Positive for weakness.   All other systems reviewed and are negative.    Objective:     Vital Signs (Most Recent):  Temp: 98.6 °F (37 °C) (04/27/24 1609)  Pulse: 84 (04/27/24 1633)  Resp: 18 (04/27/24 1633)  BP: (!) 183/91 (04/27/24 1609)  SpO2: 95 % (04/27/24 1633) Vital Signs (24h Range):  Temp:  [97.7 °F (36.5 °C)-98.6 °F (37 °C)] 98.6 °F (37 °C)  Pulse:  [] 84  Resp:  [18-19] 18  SpO2:  [93 %-96 %] 95 %  BP: (155-183)/(72-98) 183/91     Weight: 82 kg (180 lb 12.8 oz)  Body mass index is 25.22 kg/m².    Intake/Output Summary (Last 24 hours) at 4/27/2024 1711  Last data filed at 4/27/2024 1236  Gross per 24 hour   Intake 600 ml   Output 400 ml   Net 200 ml         Physical Exam  Vitals reviewed.   Constitutional:       Appearance: He is ill-appearing.   HENT:      Head: Normocephalic and atraumatic.      Mouth/Throat:      Mouth: Mucous membranes are moist.      Pharynx: Oropharynx is clear.   Eyes:      Extraocular Movements: Extraocular movements intact.      Conjunctiva/sclera: Conjunctivae normal.   Cardiovascular:      Rate and Rhythm: Normal rate and regular rhythm.      Pulses: Normal pulses.      Heart sounds: Normal heart sounds.   Pulmonary:      Effort: Respiratory distress present.       "Breath sounds: Rhonchi present.   Abdominal:      General: Bowel sounds are normal. There is distension (less so).      Palpations: Abdomen is soft.   Genitourinary:     Comments: Urinary retention requiring Queen catheter placement  Musculoskeletal:         General: Normal range of motion.      Cervical back: Normal range of motion and neck supple.   Skin:     General: Skin is warm and dry.   Neurological:      General: No focal deficit present.      Mental Status: He is alert and oriented to person, place, and time. Mental status is at baseline.             Significant Labs: All pertinent labs within the past 24 hours have been reviewed.  Blood Culture: No results for input(s): "LABBLOO" in the last 48 hours.  CBC:   Recent Labs   Lab 04/26/24  0429 04/27/24  0542   WBC 18.65* 18.61*   HGB 9.7* 10.4*   HCT 29.4* 31.7*    369     CMP:   Recent Labs   Lab 04/26/24  0429 04/27/24  0542   * 137   K 4.7 3.8   CL 98 101   CO2 31* 29   * 100   BUN 18 16   CREATININE 0.7 0.6   CALCIUM 9.0 9.5   PROT  --  6.1   ALBUMIN  --  2.6*   BILITOT  --  0.3   ALKPHOS  --  82   AST  --  83*   ALT  --  117*   ANIONGAP 4* 7*       Significant Imaging: I have reviewed all pertinent imaging results/findings within the past 24 hours.  "

## 2024-04-27 NOTE — ASSESSMENT & PLAN NOTE
Patient with significant constipation and large stool burden.  Patient given multiple laxatives and enemas with some results  GI consult appreciated  Continue  MiraLax, Dulcolax suppository  Encourage patient to mobilize

## 2024-04-27 NOTE — ASSESSMENT & PLAN NOTE
Patient found to have moderate pleural effusion on imaging. I have personally reviewed and interpreted the following imaging: Xray and CT. A thoracentesis was deferred pending pulmonology consultation.  Most likely etiology includes  possible malignancy with new lung mass discovered on CT scan, and pneumonia

## 2024-04-27 NOTE — ASSESSMENT & PLAN NOTE
Continue finasteride    Patient with acute urinary retention requiring Queen catheter placement  Overnight 4/26 patient had episode of confusion and attempted to pull Queen out.  Since then he has had some hematuria.  Have asked nurses to flush.

## 2024-04-27 NOTE — ASSESSMENT & PLAN NOTE
Patient with Hypoxic Respiratory failure which is Acute on chronic.  he is on home oxygen at 2 LPM. Supplemental oxygen was provided and noted- Oxygen Concentration (%):  [40] 40    .   Signs/symptoms of respiratory failure include- tachypnea, increased work of breathing, use of accessory muscles, and wheezing. Contributing diagnoses includes - COPD, Pleural effusion, Pneumonia, and lung mass  Labs and images were reviewed. Patient Has not had a recent ABG. Will treat underlying causes and adjust management of respiratory failure as follows-     Was given Solu-medrol x one dose per ED, with increased shortness breath and wheezing Solu-Medrol as we will do every 6 hours in addition to nebs.  Supplemental oxygen has been increased to Vapotherm at 10/50.      Continue supplemental oxygen.  We will treat with antibiotics, nebs, pulmonary hygiene

## 2024-04-27 NOTE — ASSESSMENT & PLAN NOTE
Chronic, controlled. Latest blood pressure and vitals reviewed-     Temp:  [97.7 °F (36.5 °C)-98.6 °F (37 °C)]   Pulse:  []   Resp:  [18-19]   BP: (155-183)/(72-98)   SpO2:  [93 %-96 %] .   Home meds for hypertension were reviewed and noted below.   Hypertension Medications               furosemide (LASIX) 20 MG tablet Take 20 mg by mouth.    hydrALAZINE (APRESOLINE) 50 MG tablet Take 50 mg by mouth 2 (two) times daily.            While in the hospital, will manage blood pressure as follows; trend    Will utilize p.r.n. blood pressure medication only if patient's blood pressure greater than 180/110 and he develops symptoms such as worsening chest pain or shortness of breath.

## 2024-04-27 NOTE — ASSESSMENT & PLAN NOTE
Patient has hyponatremia which is controlled,We will aim to correct the sodium by 4-6mEq in 24 hours. We will monitor sodium Daily. The hyponatremia is due to Dehydration/hypovolemia. We will treat the hyponatremia with IV fluids as follows: NS at 75 ml/hr. The patient's sodium results have been reviewed and are listed below.  Recent Labs   Lab 04/27/24  0542        We will continue to trend

## 2024-04-27 NOTE — ASSESSMENT & PLAN NOTE
Patient's anemia is currently controlled. Has not received any PRBCs to date. Etiology likely d/t  unknown, work up in progress  Current CBC reviewed-   Lab Results   Component Value Date    HGB 10.4 (L) 04/27/2024    HCT 31.7 (L) 04/27/2024     Monitor serial CBC and transfuse if patient becomes hemodynamically unstable, symptomatic or H/H drops below 7/21.    This is new finding, had normal H&H 6 months ago  Iron-deficiency, CEA negative

## 2024-04-27 NOTE — ASSESSMENT & PLAN NOTE
Likely related to pneumonia/pleural effusions and coughing  Initial troponin is normal, we will trend  EKG reviewed, sinus tach with first-degree AV block and right bundle branch block, no concerning ST or T-wave changes  Cardiac monitoring  Resolved

## 2024-04-27 NOTE — PROGRESS NOTES
[facility] - Med [facility]  [facility] Medicine  Progress Note    Patient Name: Jason Mayfield III  MRN: [id number]  Patient Class: IP- Inpatient   Admission Date: 4/22/2024  Length of Stay: 5 days  Attending Physician: Gisella Aguilar MD  Primary Care Provider: TANNER Mane MD        Subjective:     Principal Problem:Chronic respiratory failure with hypoxia        HPI:  Patient is 83-year-old male with past medical history significant for hypertension, NSTEMI, diastolic congestive heart failure, COPD, chronic hypoxic respiratory failure on home O2 @ 2L/min NC, BPH,  fibroadenoma of breast, hypothyroidism, infrarenal AAA(3.4 cm),  chronic back pain, neuropathy, shingles infection, kidney stones, colonic polyps, daily alcohol use, and former tobacco abuse who presented to ED with complaints of chest pain and dyspnea.  He reports  that for the past 2-3 days he has been having left upper chest pain, moderate, worsened with deep breathing, chronic cough, with no radiation into neck, jaw, or arms.  He also reports some generalized weakness and trouble walking over the past 2-3 days as well.  He reports some wheezing although states that this is his baseline. He denies fever, chills , abdominal pain, nausea, vomiting, diarrhea, leg pain, dysuria, or worsening edema. he states that he is normally on 2 L per minute nasal cannula, however over the past couple of days he has turned it up to 3-4 liters/minute.  He is followed by Dr. Gant.  Vital signs on arrival to ED-[facility]  with 98.3 temp, heart rate 107, respiratory rate 25, blood pressure 135/62, 91% SpO2 on 4 liters/minute nasal cannula.  He has remained afebrile while in ED. oxygen has been weaned to 3 liters/minute nasal cannula with SpO2 96%.  Lab workup reveals WBC 17.33, hemoglobin 11.5, hematocrit 34.5, sodium 131, potassium 4.7, chloride 93, CO2 24, BUN 19, creatinine 0.8, glucose 129, normal LFTs, BNP 85, troponin 0.010, lactic acid 1.0, procalcitonin 0.09,   and normal urinalysis.  EKG:  Sinus tach with first-degree AV block and right bundle-branch block, heart rate 102.  Chest x-ray notes thoracic spinal cord stimulator leads, normal heart size, mild aortic atherosclerosis, mild volume loss with vascular crowding in both lung bases.  CTA of chest shows extensive emphysema, moderate left-sided pleural effusion, mild right-sided pleural effusion, moderate right basilar atelectasis versus consolidation and mild left basilar atelectasis versus consolidation, suggestion of mass in the right lower lobe perifissural region measuring 6.2 x 10 cm, worrisome for malignancy.  He was given Rocephin, DuoNeb, Solu-Medrol, gabapentin, and tramadol while in ED. Hospital Medicine was consulted for admission due to worsening hypoxic respiratory failure, pneumonia, and newly discovered lung mass.    Overview/Hospital Course:    Patient was attempting to eat breakfast and reposition himself in bed with increased work of breathing and wheezing on exam.  Pulmonary evaluated the patient and he was started on Vapotherm 25/50 Solu-Medrol IV and nebulizer.  Patient's shortness of breath improved.  He still has significant abdominal distention with minimal stool output.    Patient was oxygen requirements have continued to improve.  He continues on steroid taper.  As well as neb treatments.  Nonproductive cough.    Constipation-patient has had no bowel movement for 10 days prior to admission.  X-rays revealed large amount of stool.  He received multiple cathartics including to enemas.  He is continuing to have bowel movements with softening of his abdomen.    Interval History:  Patient seen and examined at bedside with wife present.  He would episode of confusion last p.m..  Today he was back to baseline mental status.  Reports pleuritic chest pain has completely resolved.  He was out of bed to use bedside commode with assistance.    Review of Systems   Constitutional:  Positive for activity  change, appetite change and fatigue.   Respiratory:  Positive for shortness of breath. Negative for chest tightness and wheezing.    Cardiovascular:  Negative for chest pain, palpitations and leg swelling.   Gastrointestinal:  Positive for abdominal pain (Improving).   Genitourinary:  Positive for difficulty urinating.   Musculoskeletal:  Positive for arthralgias.   Neurological:  Positive for weakness.   All other systems reviewed and are negative.    Objective:     Vital Signs (Most Recent):  Temp: 98.6 °F (37 °C) (04/27/24 1609)  Pulse: 84 (04/27/24 1633)  Resp: 18 (04/27/24 1633)  BP: (!) 183/91 (04/27/24 1609)  SpO2: 95 % (04/27/24 1633) Vital Signs (24h Range):  Temp:  [97.7 °F (36.5 °C)-98.6 °F (37 °C)] 98.6 °F (37 °C)  Pulse:  [] 84  Resp:  [18-19] 18  SpO2:  [93 %-96 %] 95 %  BP: (155-183)/(72-98) 183/91     Weight: 82 kg (180 lb 12.8 oz)  Body mass index is 25.22 kg/m².    Intake/Output Summary (Last 24 hours) at 4/27/2024 1711  Last data filed at 4/27/2024 1236  Gross per 24 hour   Intake 600 ml   Output 400 ml   Net 200 ml         Physical Exam  Vitals reviewed.   Constitutional:       Appearance: He is ill-appearing.   HENT:      Head: Normocephalic and atraumatic.      Mouth/Throat:      Mouth: Mucous membranes are moist.      Pharynx: Oropharynx is clear.   Eyes:      Extraocular Movements: Extraocular movements intact.      Conjunctiva/sclera: Conjunctivae normal.   Cardiovascular:      Rate and Rhythm: Normal rate and regular rhythm.      Pulses: Normal pulses.      Heart sounds: Normal heart sounds.   Pulmonary:      Effort: Respiratory distress present.      Breath sounds: Rhonchi present.   Abdominal:      General: Bowel sounds are normal. There is distension (less so).      Palpations: Abdomen is soft.   Genitourinary:     Comments: Urinary retention requiring Queen catheter placement  Musculoskeletal:         General: Normal range of motion.      Cervical back: Normal range of motion and  "neck supple.   Skin:     General: Skin is warm and dry.   Neurological:      General: No focal deficit present.      Mental Status: He is alert and oriented to person, place, and time. Mental status is at baseline.             Significant Labs: All pertinent labs within the past 24 hours have been reviewed.  Blood Culture: No results for input(s): "LABBLOO" in the last 48 hours.  CBC:   Recent Labs   Lab 04/26/24  0429 04/27/24  0542   WBC 18.65* 18.61*   HGB 9.7* 10.4*   HCT 29.4* 31.7*    369     CMP:   Recent Labs   Lab 04/26/24  0429 04/27/24  0542   * 137   K 4.7 3.8   CL 98 101   CO2 31* 29   * 100   BUN 18 16   CREATININE 0.7 0.6   CALCIUM 9.0 9.5   PROT  --  6.1   ALBUMIN  --  2.6*   BILITOT  --  0.3   ALKPHOS  --  82   AST  --  83*   ALT  --  117*   ANIONGAP 4* 7*       Significant Imaging: I have reviewed all pertinent imaging results/findings within the past 24 hours.    Assessment/Plan:      * Chronic respiratory failure with hypoxia  Patient with Hypoxic Respiratory failure which is Acute on chronic.  he is on home oxygen at 2 LPM. Supplemental oxygen was provided and noted- Oxygen Concentration (%):  [40] 40    .   Signs/symptoms of respiratory failure include- tachypnea, increased work of breathing, use of accessory muscles, and wheezing. Contributing diagnoses includes - COPD, Pleural effusion, Pneumonia, and lung mass  Labs and images were reviewed. Patient Has not had a recent ABG. Will treat underlying causes and adjust management of respiratory failure as follows-     Was given Solu-medrol x one dose per ED, with increased shortness breath and wheezing Solu-Medrol as we will do every 6 hours in addition to nebs.  Supplemental oxygen has been increased to Vapotherm at 10/50.      Continue supplemental oxygen.  We will treat with antibiotics, nebs, pulmonary hygiene    Abdominal distension  Patient with significant constipation and large stool burden.  Patient given multiple " laxatives and enemas with some results  GI consult appreciated  Continue  MiraLax, Dulcolax suppository  Encourage patient to mobilize      Anemia  Patient's anemia is currently controlled. Has not received any PRBCs to date. Etiology likely d/t  unknown, work up in progress  Current CBC reviewed-   Lab Results   Component Value Date    HGB 10.4 (L) 04/27/2024    HCT 31.7 (L) 04/27/2024     Monitor serial CBC and transfuse if patient becomes hemodynamically unstable, symptomatic or H/H drops below 7/21.    This is new finding, had normal H&H 6 months ago  Iron-deficiency, CEA negative        Hyponatremia  Patient has hyponatremia which is controlled,We will aim to correct the sodium by 4-6mEq in 24 hours. We will monitor sodium Daily. The hyponatremia is due to Dehydration/hypovolemia. We will treat the hyponatremia with IV fluids as follows: NS at 75 ml/hr. The patient's sodium results have been reviewed and are listed below.  Recent Labs   Lab 04/27/24  0542        We will continue to trend    Bilateral pleural effusion  Patient found to have moderate pleural effusion on imaging. I have personally reviewed and interpreted the following imaging: Xray and CT. A thoracentesis was deferred pending pulmonology consultation.  Most likely etiology includes  possible malignancy with new lung mass discovered on CT scan, and pneumonia        BPH (benign prostatic hyperplasia)  Continue finasteride    Patient with acute urinary retention requiring Queen catheter placement  Overnight 4/26 patient had episode of confusion and attempted to pull Queen out.  Since then he has had some hematuria.  Have asked nurses to flush.    Chronic diastolic congestive heart failure  Patient is identified as having Diastolic (HFpEF) heart failure that is Chronic. CHF is currently controlled. Latest ECHO performed and demonstrates- No results found for this or any previous visit.  Monitor clinical status closely. Monitor on telemetry.  Patient is off CHF pathway.  Monitor strict Is&Os and daily weights.  Fluid restriction not indicated at this time. Cardiology has not been consulted. Continue to stress to patient importance of self efficacy and  on diet for CHF. Last BNP reviewed- and noted below   Recent Labs   Lab 04/25/24  0026   *       Holding home Lasix 20 mg daily at this time as patient appears dry on exam    COPD exacerbation  Patient's COPD is with exacerbation noted by continued dyspnea and worsening of baseline hypoxia currently.  Patient is currently off COPD Pathway. Continue scheduled inhalers Antibiotics and Supplemental oxygen and monitor respiratory status closely.   Was given 1 dose of IV Solu-Medrol with significant improvement, we will hold off on additional steroids at this time, add back PRN  Nebs ordered every 6 hours    Primary hypertension  Chronic, controlled. Latest blood pressure and vitals reviewed-     Temp:  [97.7 °F (36.5 °C)-98.6 °F (37 °C)]   Pulse:  []   Resp:  [18-19]   BP: (155-183)/(72-98)   SpO2:  [93 %-96 %] .   Home meds for hypertension were reviewed and noted below.   Hypertension Medications               furosemide (LASIX) 20 MG tablet Take 20 mg by mouth.    hydrALAZINE (APRESOLINE) 50 MG tablet Take 50 mg by mouth 2 (two) times daily.            While in the hospital, will manage blood pressure as follows; trend    Will utilize p.r.n. blood pressure medication only if patient's blood pressure greater than 180/110 and he develops symptoms such as worsening chest pain or shortness of breath.    Pleuritic chest pain  Likely related to pneumonia/pleural effusions and coughing  Initial troponin is normal, we will trend  EKG reviewed, sinus tach with first-degree AV block and right bundle branch block, no concerning ST or T-wave changes  Cardiac monitoring  Resolved      Pneumonia  We will treat for pneumonia with Rocephin and Zithromax at this time  Follow-up blood cultures  Sputum  culture ordered, patient does have productive sounding cough however difficult to bring up secretions  Monitor chest x-ray      Right lower lobe lung mass  Mass in the right lower lobe in the roseanne fissural region measures 6.2 by 10 cm worrisome for malignancy  Pulmonology consulted  Uncertain as his last CT scan in September was negative we will continue to follow      VTE Risk Mitigation (From admission, onward)           Ordered     Reason for No Pharmacological VTE Prophylaxis  Once        Comments: Possible procedure   Question:  Reasons:  Answer:  Physician Provided (leave comment)    04/23/24 0030     IP VTE HIGH RISK PATIENT  Once         04/23/24 0030     Place sequential compression device  Until discontinued         04/23/24 0030                    Discharge Planning   ERIKA:      Code Status: Full Code   Is the patient medically ready for discharge?:     Reason for patient still in hospital (select all that apply): Patient trending condition, Treatment, Consult recommendations, PT / OT recommendations, and Pending disposition  Discharge Plan A: Home with family, Home Health                  Gisella Callejas MD  Department of Hospital Medicine   [facility] - Med S[facility]

## 2024-04-28 LAB
BACTERIA BLD CULT: NORMAL
BACTERIA BLD CULT: NORMAL
BASOPHILS NFR BLD: 0 % (ref 0–1.9)
DIFFERENTIAL METHOD BLD: ABNORMAL
EOSINOPHIL NFR BLD: 1 % (ref 0–8)
ERYTHROCYTE [DISTWIDTH] IN BLOOD BY AUTOMATED COUNT: 13.2 % (ref 11.5–14.5)
HCT VFR BLD AUTO: 28.9 % (ref 40–54)
HGB BLD-MCNC: 9.5 G/DL (ref 14–18)
IMM GRANULOCYTES # BLD AUTO: ABNORMAL K/UL (ref 0–0.04)
IMM GRANULOCYTES NFR BLD AUTO: ABNORMAL % (ref 0–0.5)
LYMPHOCYTES NFR BLD: 17 % (ref 18–48)
MCH RBC QN AUTO: 31.8 PG (ref 27–31)
MCHC RBC AUTO-ENTMCNC: 32.9 G/DL (ref 32–36)
MCV RBC AUTO: 97 FL (ref 82–98)
MONOCYTES NFR BLD: 4 % (ref 4–15)
MYELOCYTES NFR BLD MANUAL: 1 %
NEUTROPHILS NFR BLD: 76 % (ref 38–73)
NEUTS BAND NFR BLD MANUAL: 1 %
NRBC BLD-RTO: 0 /100 WBC
PLATELET # BLD AUTO: 362 K/UL (ref 150–450)
PLATELET BLD QL SMEAR: ABNORMAL
PMV BLD AUTO: 9.5 FL (ref 9.2–12.9)
RBC # BLD AUTO: 2.99 M/UL (ref 4.6–6.2)
WBC # BLD AUTO: 14.36 K/UL (ref 3.9–12.7)

## 2024-04-28 PROCEDURE — 99900035 HC TECH TIME PER 15 MIN (STAT)

## 2024-04-28 PROCEDURE — 63600175 PHARM REV CODE 636 W HCPCS: Performed by: NURSE PRACTITIONER

## 2024-04-28 PROCEDURE — 25000242 PHARM REV CODE 250 ALT 637 W/ HCPCS: Performed by: INTERNAL MEDICINE

## 2024-04-28 PROCEDURE — 27000646 HC AEROBIKA DEVICE

## 2024-04-28 PROCEDURE — 97530 THERAPEUTIC ACTIVITIES: CPT | Mod: CQ

## 2024-04-28 PROCEDURE — 85027 COMPLETE CBC AUTOMATED: CPT | Performed by: NURSE PRACTITIONER

## 2024-04-28 PROCEDURE — 94761 N-INVAS EAR/PLS OXIMETRY MLT: CPT

## 2024-04-28 PROCEDURE — 63700000 PHARM REV CODE 250 ALT 637 W/O HCPCS: Performed by: INTERNAL MEDICINE

## 2024-04-28 PROCEDURE — 63600175 PHARM REV CODE 636 W HCPCS: Performed by: INTERNAL MEDICINE

## 2024-04-28 PROCEDURE — 85007 BL SMEAR W/DIFF WBC COUNT: CPT | Performed by: NURSE PRACTITIONER

## 2024-04-28 PROCEDURE — 25000003 PHARM REV CODE 250: Performed by: NURSE PRACTITIONER

## 2024-04-28 PROCEDURE — 27100171 HC OXYGEN HIGH FLOW UP TO 24 HOURS

## 2024-04-28 PROCEDURE — 94664 DEMO&/EVAL PT USE INHALER: CPT

## 2024-04-28 PROCEDURE — 11000001 HC ACUTE MED/SURG PRIVATE ROOM

## 2024-04-28 PROCEDURE — 94640 AIRWAY INHALATION TREATMENT: CPT

## 2024-04-28 PROCEDURE — 25000003 PHARM REV CODE 250: Performed by: INTERNAL MEDICINE

## 2024-04-28 PROCEDURE — 36415 COLL VENOUS BLD VENIPUNCTURE: CPT | Performed by: NURSE PRACTITIONER

## 2024-04-28 PROCEDURE — 97116 GAIT TRAINING THERAPY: CPT | Mod: CQ

## 2024-04-28 RX ORDER — TAMSULOSIN HYDROCHLORIDE 0.4 MG/1
0.4 CAPSULE ORAL NIGHTLY
Status: DISCONTINUED | OUTPATIENT
Start: 2024-04-28 | End: 2024-05-02 | Stop reason: HOSPADM

## 2024-04-28 RX ORDER — HYDRALAZINE HYDROCHLORIDE 20 MG/ML
10 INJECTION INTRAMUSCULAR; INTRAVENOUS EVERY 6 HOURS PRN
Status: DISCONTINUED | OUTPATIENT
Start: 2024-04-28 | End: 2024-05-02 | Stop reason: HOSPADM

## 2024-04-28 RX ORDER — HYDROCHLOROTHIAZIDE 12.5 MG/1
12.5 TABLET ORAL DAILY
Status: DISCONTINUED | OUTPATIENT
Start: 2024-04-29 | End: 2024-05-02 | Stop reason: HOSPADM

## 2024-04-28 RX ORDER — HYDRALAZINE HYDROCHLORIDE 25 MG/1
50 TABLET, FILM COATED ORAL EVERY 8 HOURS
Status: DISCONTINUED | OUTPATIENT
Start: 2024-04-28 | End: 2024-05-02 | Stop reason: HOSPADM

## 2024-04-28 RX ADMIN — GABAPENTIN 1200 MG: 400 CAPSULE ORAL at 09:04

## 2024-04-28 RX ADMIN — TAMSULOSIN HYDROCHLORIDE 0.4 MG: 0.4 CAPSULE ORAL at 09:04

## 2024-04-28 RX ADMIN — HYDRALAZINE HYDROCHLORIDE 50 MG: 25 TABLET, FILM COATED ORAL at 08:04

## 2024-04-28 RX ADMIN — GABAPENTIN 1200 MG: 400 CAPSULE ORAL at 04:04

## 2024-04-28 RX ADMIN — ALBUTEROL SULFATE 2.5 MG: 2.5 SOLUTION RESPIRATORY (INHALATION) at 07:04

## 2024-04-28 RX ADMIN — GUAIFENESIN 600 MG: 600 TABLET, EXTENDED RELEASE ORAL at 09:04

## 2024-04-28 RX ADMIN — ALBUTEROL SULFATE 2.5 MG: 2.5 SOLUTION RESPIRATORY (INHALATION) at 04:04

## 2024-04-28 RX ADMIN — ALBUTEROL SULFATE 2.5 MG: 2.5 SOLUTION RESPIRATORY (INHALATION) at 12:04

## 2024-04-28 RX ADMIN — FINASTERIDE 5 MG: 5 TABLET, FILM COATED ORAL at 08:04

## 2024-04-28 RX ADMIN — ENOXAPARIN SODIUM 40 MG: 40 INJECTION SUBCUTANEOUS at 04:04

## 2024-04-28 RX ADMIN — GABAPENTIN 1200 MG: 400 CAPSULE ORAL at 08:04

## 2024-04-28 RX ADMIN — SENNOSIDES AND DOCUSATE SODIUM 2 TABLET: 50; 8.6 TABLET ORAL at 09:04

## 2024-04-28 RX ADMIN — GUAIFENESIN 600 MG: 600 TABLET, EXTENDED RELEASE ORAL at 08:04

## 2024-04-28 RX ADMIN — HYDRALAZINE HYDROCHLORIDE 50 MG: 25 TABLET, FILM COATED ORAL at 09:04

## 2024-04-28 RX ADMIN — METOPROLOL TARTRATE 25 MG: 25 TABLET, FILM COATED ORAL at 09:04

## 2024-04-28 RX ADMIN — BUDESONIDE INHALATION 0.5 MG: 0.5 SUSPENSION RESPIRATORY (INHALATION) at 08:04

## 2024-04-28 RX ADMIN — BUDESONIDE INHALATION 0.5 MG: 0.5 SUSPENSION RESPIRATORY (INHALATION) at 07:04

## 2024-04-28 RX ADMIN — FLUCONAZOLE 100 MG: 100 TABLET ORAL at 08:04

## 2024-04-28 RX ADMIN — CEFTRIAXONE 2 G: 2 INJECTION, POWDER, FOR SOLUTION INTRAMUSCULAR; INTRAVENOUS at 06:04

## 2024-04-28 RX ADMIN — ALBUTEROL SULFATE 2.5 MG: 2.5 SOLUTION RESPIRATORY (INHALATION) at 08:04

## 2024-04-28 RX ADMIN — METOPROLOL TARTRATE 25 MG: 25 TABLET, FILM COATED ORAL at 08:04

## 2024-04-28 RX ADMIN — PREDNISONE 40 MG: 20 TABLET ORAL at 08:04

## 2024-04-28 NOTE — ASSESSMENT & PLAN NOTE
Mass in the right lower lobe in the roseanne fissural region measures 6.2 by 10 cm worrisome for malignancy  Pulmonology consulted  Uncertain as his last CT scan in September was negative we will continue to follow    Repeat ct lynn

## 2024-04-28 NOTE — PROGRESS NOTES
[facility]  [facility]  Progress Note    Patient Name: Jason Mayfield III  MRN: [id number]  Patient Class: IP- Inpatient   Admission Date: 4/22/2024  Length of Stay: 6 days  Attending Physician: Gisella Aguilar MD  Primary Care Provider: TANNER Mane MD        Subjective:     Principal Problem:Chronic respiratory failure with hypoxia        HPI:  Patient is 83-year-old male with past medical history significant for hypertension, NSTEMI, diastolic congestive heart failure, COPD, chronic hypoxic respiratory failure on home O2 @ 2L/min NC, BPH,  fibroadenoma of breast, hypothyroidism, infrarenal AAA(3.4 cm),  chronic back pain, neuropathy, shingles infection, kidney stones, colonic polyps, daily alcohol use, and former tobacco abuse who presented to ED with complaints of chest pain and dyspnea.  He reports  that for the past 2-3 days he has been having left upper chest pain, moderate, worsened with deep breathing, chronic cough, with no radiation into neck, jaw, or arms.  He also reports some generalized weakness and trouble walking over the past 2-3 days as well.  He reports some wheezing although states that this is his baseline. He denies fever, chills , abdominal pain, nausea, vomiting, diarrhea, leg pain, dysuria, or worsening edema. he states that he is normally on 2 L per minute nasal cannula, however over the past couple of days he has turned it up to 3-4 liters/minute.  He is followed by Dr. Gant.  Vital signs on arrival to ED-[facility]  with 98.3 temp, heart rate 107, respiratory rate 25, blood pressure 135/62, 91% SpO2 on 4 liters/minute nasal cannula.  He has remained afebrile while in ED. oxygen has been weaned to 3 liters/minute nasal cannula with SpO2 96%.  Lab workup reveals WBC 17.33, hemoglobin 11.5, hematocrit 34.5, sodium 131, potassium 4.7, chloride 93, CO2 24, BUN 19, creatinine 0.8, glucose 129, normal LFTs, BNP 85, troponin 0.010, lactic acid 1.0, procalcitonin 0.09,   and normal urinalysis.  EKG:  Sinus tach with first-degree AV block and right bundle-branch block, heart rate 102.  Chest x-ray notes thoracic spinal cord stimulator leads, normal heart size, mild aortic atherosclerosis, mild volume loss with vascular crowding in both lung bases.  CTA of chest shows extensive emphysema, moderate left-sided pleural effusion, mild right-sided pleural effusion, moderate right basilar atelectasis versus consolidation and mild left basilar atelectasis versus consolidation, suggestion of mass in the right lower lobe perifissural region measuring 6.2 x 10 cm, worrisome for malignancy.  He was given Rocephin, DuoNeb, Solu-Medrol, gabapentin, and tramadol while in ED. Hospital Medicine was consulted for admission due to worsening hypoxic respiratory failure, pneumonia, and newly discovered lung mass.    Overview/Hospital Course:  Patient is an 83-year-old male with a history of hypertension, coronary artery disease, diastolic dysfunction, COPD with chronic hypoxic respiratory on 2 L nasal cannula, BPH, chronic back pain, neuropathy, daily alcohol use who presented emergency room with worsening left upper chest pain that was pleuritic in nature.  He says that he has had worsening generalized weakness and trouble walking over 2-3 days prior to admission.  CTA of the chest revealed extensive emphysema moderate left-sided pleural effusion mild right-sided pleural effusion with right-sided atelectasis versus consolidation.  He was a question of a mass in the right lower lobe measuring 6.2 x 10 cm.  He was admitted after cultures were obtained started on IV antibiotics for possible aspiration pneumonia.  Patient was attempting to eat breakfast and reposition himself in bed with increased work of breathing and wheezing on exam.  Pulmonary evaluated the patient and he was started on Vapotherm 25/50 Solu-Medrol IV and nebulizer.  Patient's shortness of breath improved.  He still has significant  abdominal distention with minimal stool output.    Constipation-patient has had no bowel movement for 10 days prior to admission.  X-rays revealed large amount of stool.  He received multiple cathartics including to enemas.  He is continuing to have bowel movements with softening of his abdomen.    Patient was oxygen requirements have continued to improve.  He continues on steroid taper.  As well as neb treatments.  Nonproductive cough.  Per Pulmonary  Left pleural effusion appears slightly larger from prior imaging; obtain CT chest in am for further evaluation; may need thoracentesis if larger or appears loculated  Close pulmonary follow-up upon discharge. May ultimately need bronchoscopy and further work-up of lung mass. Repeat imaging for clearance / improvement in 8-12 weeks optimal prior to further work-up.  Patient has continued to be encouraged to participate with physical therapy and be out of bed 2-3 times per day.                  Interval History:  Patient seen and examined with family at bedside.  Much more alert and willing to participate.  Worked with physical therapy.    Review of Systems   Constitutional:  Positive for activity change, appetite change and fatigue.   Respiratory:  Positive for shortness of breath. Negative for chest tightness and wheezing.    Cardiovascular:  Negative for chest pain, palpitations and leg swelling.   Gastrointestinal:  Negative for abdominal pain (Improving).   Genitourinary:  Positive for difficulty urinating.   Musculoskeletal:  Positive for arthralgias.   Neurological:  Positive for weakness.   All other systems reviewed and are negative.    Objective:     Vital Signs (Most Recent):  Temp: 97.8 °F (36.6 °C) (04/28/24 1609)  Pulse: 78 (04/28/24 1653)  Resp: 18 (04/28/24 1653)  BP: (!) 197/92 (04/28/24 1609)  SpO2: 99 % (04/28/24 1653) Vital Signs (24h Range):  Temp:  [97.6 °F (36.4 °C)-98.8 °F (37.1 °C)] 97.8 °F (36.6 °C)  Pulse:  [71-86] 78  Resp:  [15-18] 18  SpO2:   [92 %-99 %] 99 %  BP: (162-197)/() 197/92     Weight: 82 kg (180 lb 12.8 oz)  Body mass index is 25.22 kg/m².    Intake/Output Summary (Last 24 hours) at 4/28/2024 1731  Last data filed at 4/28/2024 0425  Gross per 24 hour   Intake 708.63 ml   Output 1100 ml   Net -391.37 ml         Physical Exam  Vitals reviewed.   Constitutional:       Appearance: He is ill-appearing.   HENT:      Head: Normocephalic and atraumatic.      Mouth/Throat:      Mouth: Mucous membranes are moist.      Pharynx: Oropharynx is clear.   Eyes:      Extraocular Movements: Extraocular movements intact.      Conjunctiva/sclera: Conjunctivae normal.   Cardiovascular:      Rate and Rhythm: Normal rate and regular rhythm.      Pulses: Normal pulses.      Heart sounds: Normal heart sounds.   Pulmonary:      Effort: No respiratory distress.      Breath sounds: No rhonchi.   Abdominal:      General: Bowel sounds are normal. There is distension (less so).      Palpations: Abdomen is soft.   Genitourinary:     Comments: Urinary retention requiring Queen catheter placement  Musculoskeletal:         General: Normal range of motion.      Cervical back: Normal range of motion and neck supple.   Skin:     General: Skin is warm and dry.   Neurological:      General: No focal deficit present.      Mental Status: He is alert and oriented to person, place, and time. Mental status is at baseline.             Significant Labs: All pertinent labs within the past 24 hours have been reviewed.  CBC:   Recent Labs   Lab 04/27/24  0542 04/28/24  0404   WBC 18.61* 14.36*   HGB 10.4* 9.5*   HCT 31.7* 28.9*    362     CMP:   Recent Labs   Lab 04/27/24  0542      K 3.8      CO2 29      BUN 16   CREATININE 0.6   CALCIUM 9.5   PROT 6.1   ALBUMIN 2.6*   BILITOT 0.3   ALKPHOS 82   AST 83*   *   ANIONGAP 7*       Significant Imaging: I have reviewed all pertinent imaging results/findings within the past 24 hours.    Assessment/Plan:       * Chronic respiratory failure with hypoxia  Patient with Hypoxic Respiratory failure which is Acute on chronic.  he is on home oxygen at 2 LPM. Supplemental oxygen was provided and noted- Oxygen Concentration (%):  [40] 40    .   Signs/symptoms of respiratory failure include- tachypnea, increased work of breathing, use of accessory muscles, and wheezing. Contributing diagnoses includes - COPD, Pleural effusion, Pneumonia, and lung mass  Labs and images were reviewed. Patient Has not had a recent ABG. Will treat underlying causes and adjust management of respiratory failure as follows-     Was given Solu-medrol x one dose per ED, with increased shortness breath and wheezing Solu-Medrol as we will do every 6 hours in addition to nebs.  Supplemental oxygen has been increased to Vapotherm at 10/50.      Continue supplemental oxygen.  We will treat with antibiotics, nebs, pulmonary hygiene    Abdominal distension  Patient with significant constipation and large stool burden.  Patient given multiple laxatives and enemas with some results  GI consult appreciated  Continue  MiraLax, Dulcolax suppository  Encourage patient to mobilize      Anemia  Patient's anemia is currently controlled. Has not received any PRBCs to date. Etiology likely d/t  unknown, work up in progress  Current CBC reviewed-   Lab Results   Component Value Date    HGB 9.5 (L) 04/28/2024    HCT 28.9 (L) 04/28/2024     Monitor serial CBC and transfuse if patient becomes hemodynamically unstable, symptomatic or H/H drops below 7/21.    This is new finding, had normal H&H 6 months ago  Iron-deficiency, CEA negative        Hyponatremia  Patient has hyponatremia which is controlled,We will aim to correct the sodium by 4-6mEq in 24 hours. We will monitor sodium Daily. The hyponatremia is due to Dehydration/hypovolemia. We will treat the hyponatremia with IV fluids as follows: NS at 75 ml/hr. The patient's sodium results have been reviewed and are listed  below.    We will continue to trend    Bilateral pleural effusion  Patient found to have moderate pleural effusion on imaging. I have personally reviewed and interpreted the following imaging: Xray and CT. A thoracentesis was deferred pending pulmonology consultation.  Most likely etiology includes  possible malignancy with new lung mass discovered on CT scan, and pneumonia      Repeat CT scan in am      BPH (benign prostatic hyperplasia)  Continue finasteride    Patient with acute urinary retention requiring Queen catheter placement  Overnight 4/26 patient had episode of confusion and attempted to pull Queen out.  Since then he has had some hematuria.  Have asked nurses to flush.    Chronic diastolic congestive heart failure  Patient is identified as having Diastolic (HFpEF) heart failure that is Chronic. CHF is currently controlled. Latest ECHO performed and demonstrates- No results found for this or any previous visit.  Monitor clinical status closely. Monitor on telemetry. Patient is off CHF pathway.  Monitor strict Is&Os and daily weights.  Fluid restriction not indicated at this time. Cardiology has not been consulted. Continue to stress to patient importance of self efficacy and  on diet for CHF. Last BNP reviewed- and noted below   Recent Labs   Lab 04/25/24  0026   *       Holding home Lasix 20 mg daily at this time as patient appears dry on exam    COPD exacerbation  Patient's COPD is with exacerbation noted by continued dyspnea and worsening of baseline hypoxia currently.  Patient is currently off COPD Pathway. Continue scheduled inhalers Antibiotics and Supplemental oxygen and monitor respiratory status closely.   Was given 1 dose of IV Solu-Medrol with significant improvement, we will hold off on additional steroids at this time, add back PRN  Nebs ordered every 6 hours    Primary hypertension  Chronic, controlled. Latest blood pressure and vitals reviewed-     Temp:  [97.6 °F (36.4  °C)-98.8 °F (37.1 °C)]   Pulse:  [71-86]   Resp:  [15-18]   BP: (162-197)/()   SpO2:  [92 %-99 %] .   Home meds for hypertension were reviewed and noted below.   Hypertension Medications               furosemide (LASIX) 20 MG tablet Take 20 mg by mouth.    hydrALAZINE (APRESOLINE) 50 MG tablet Take 50 mg by mouth 2 (two) times daily.            While in the hospital, will manage blood pressure as follows; trend    Will utilize p.r.n. blood pressure medication only if patient's blood pressure greater than 180/110 and he develops symptoms such as worsening chest pain or shortness of breath.    Pleuritic chest pain  Likely related to pneumonia/pleural effusions and coughing  Initial troponin is normal, we will trend  EKG reviewed, sinus tach with first-degree AV block and right bundle branch block, no concerning ST or T-wave changes  Cardiac monitoring  Resolved      Pneumonia  We will treat for pneumonia with Rocephin and Zithromax at this time  Follow-up blood cultures  Sputum culture ordered, patient does have productive sounding cough however difficult to bring up secretions  Monitor chest x-ray      Right lower lobe lung mass  Mass in the right lower lobe in the roseanne fissural region measures 6.2 by 10 cm worrisome for malignancy  Pulmonology consulted  Uncertain as his last CT scan in September was negative we will continue to follow    Repeat ct lynn      VTE Risk Mitigation (From admission, onward)           Ordered     enoxaparin injection 40 mg  Every 24 hours         04/27/24 1719     Reason for No Pharmacological VTE Prophylaxis  Once        Comments: Possible procedure   Question:  Reasons:  Answer:  Physician Provided (leave comment)    04/23/24 0030     IP VTE HIGH RISK PATIENT  Once         04/23/24 0030     Place sequential compression device  Until discontinued         04/23/24 0030                    Discharge Planning   ERIKA:      Code Status: Full Code   Is the patient medically ready for  discharge?:     Reason for patient still in hospital (select all that apply): Patient trending condition, Treatment, Imaging, Consult recommendations, and PT / OT recommendations  Discharge Plan A: Home with family, Home Health                  Gisella Callejas MD  Department of Hospital Medicine   [facility]

## 2024-04-28 NOTE — ASSESSMENT & PLAN NOTE
Patient has hyponatremia which is controlled,We will aim to correct the sodium by 4-6mEq in 24 hours. We will monitor sodium Daily. The hyponatremia is due to Dehydration/hypovolemia. We will treat the hyponatremia with IV fluids as follows: NS at 75 ml/hr. The patient's sodium results have been reviewed and are listed below.    We will continue to trend

## 2024-04-28 NOTE — ASSESSMENT & PLAN NOTE
Patient with chronic hypoxic respiratory failure on home oxygen of 2L/NC.     CT chest imaging in September 2023 with marked emphysematous changes and some bronchial wall thickening. No evidence of mass / consolidation / nodules on imaging. Trace bilateral effusions noted   Repeat CT chest yesterday reviewed in comparison to prior imaging. Low suspicion for underlying malignancy in light of no evidence of prior mass, lesion, nodule. No mediastinal lymphadenopathy  Supplemental oxygen to maintain sats 90% or greater  Weaned off Vapotherm; currently on 8L/NC with sats 98% this morning  Sputum culture with Ree Albicans  Legionella and fungitell negative   Fungitell and fungal immuno studies pending  Completed Rocephin and Azith course  Follow chest imaging as needed  Follow fever and WBC trends  Complete Prednisone taper as ordered  CT abdomen with significant constipation which I suspect is complicating the patient's shortness of breath due to diaphragmatic impairment  Mobilize as able; OOB in chair  Left pleural effusion appears slightly larger from prior imaging; obtain CT chest in am for further evaluation; may need thoracentesis if larger or appears loculated  Close pulmonary follow-up upon discharge. May ultimately need bronchoscopy and further work-up of lung mass. Repeat imaging for clearance / improvement in 8-12 weeks optimal prior to further work-up.

## 2024-04-28 NOTE — ASSESSMENT & PLAN NOTE
Chronic, controlled. Latest blood pressure and vitals reviewed-     Temp:  [97.6 °F (36.4 °C)-98.8 °F (37.1 °C)]   Pulse:  [71-86]   Resp:  [15-18]   BP: (162-197)/()   SpO2:  [92 %-99 %] .   Home meds for hypertension were reviewed and noted below.   Hypertension Medications               furosemide (LASIX) 20 MG tablet Take 20 mg by mouth.    hydrALAZINE (APRESOLINE) 50 MG tablet Take 50 mg by mouth 2 (two) times daily.            While in the hospital, will manage blood pressure as follows; trend    Will utilize p.r.n. blood pressure medication only if patient's blood pressure greater than 180/110 and he develops symptoms such as worsening chest pain or shortness of breath.

## 2024-04-28 NOTE — PLAN OF CARE
BED MOB SBA    SIT<-->STAND CG FROM EOB AND FROM BS CHAIR WITH VC FOR UPPER EXTREMITY    FORWARD/BACKWARD AMBULATION WITH RW 3 X WITH 2 STEPS EACH TRIAL VC FOR SEQUENCE, SIDE STEPS TO THE LOWER EXTREMITY WITH 90 DEGREE TO TURN TO THE CHAIR    EDUCATED ON CALL DON'T FALL PROCEDURE AND OOB X 3 MEALS/DAY     HE PARTICIPATED WITH MORE ACTIVITY TODAY INDICATING THAT HE IS GETTING STRONGER. HE MAY BENEFIT FROM STAYING OOB MORE REGULARLY DURING THE DAY. REPORTS INCREASED FATIGUE AFTER THIS SESSION

## 2024-04-28 NOTE — ASSESSMENT & PLAN NOTE
Patient found to have moderate pleural effusion on imaging. I have personally reviewed and interpreted the following imaging: Xray and CT. A thoracentesis was deferred pending pulmonology consultation.  Most likely etiology includes  possible malignancy with new lung mass discovered on CT scan, and pneumonia      Repeat CT scan in am

## 2024-04-28 NOTE — PROGRESS NOTES
[facility], [facility] (formerly [facility])  Pulmonology Progress Note    Patient Name: Jason Mayfield III  MRN: [id number]  Admission Date: 4/22/2024  Hospital Length of Stay: 6 days  Code Status: Full Code   Attending Provider: Gisella Aguilar MD  Subjective:   History of present illness:  Jason Mayfield is a 83-year-old male with a past medical history significant for hypertension, NSTEMI, chronic diastolic heart failure, COPD / emphysema, chronic hypoxic respiratory failure on home oxygen of 2L/NC, BPH, hypothyroidism, AAA, chronic back pain, neuropathy, daily alcohol use, and former tobacco abuse who presented for evaluation of left lower chest pain and shortness of breath. Patient and family endorses a dry non-productive cough which had been ongoing for approximately 5days. Denies fever / chills or diarrhea at home. Endorses constipation and reports no bowel movement for almost two weeks. On exam, some mild LUQ tenderness with palpation; however, no guarding noted. Abdomen firm to touch with faint minimal bowel sounds. Due to his severe pain, the patient reports difficulty with taking deep breaths. Pain exacerbated by cough / deep inspiration. CTA chest completed at time of presentation demonstrates extensive emphysematous changes, bilateral pleural effusions, and RLL consolidation / mass. Patient was admitted by hospital medicine and pulmonary consulted for evaluation.     Of note, patient is followed by Dr. Gant with [facility] pulmonology. 60-pack year smoking history. Previous CT imaging in September 2023 with marked emphysematous changes and some bronchial wall thickening. No evidence of mass / consolidation / nodules on imaging. Trace bilateral effusions noted. Patient is a resident in [place], [place] with reports of 3 cats and 2 dogs in their home.     Patient recently seen by Dr. Gant in March 2023 who felt his shortness of breath was multifactorial. Patient is noted to have moderate  "impairment on his pulmonary testing and felt his conditions were exacerbated by his underlying diastolic heart failure. In addition, it was felt a significant portion of his SOB was likely contributed to physical deconditioning as he demonstrates activity intolerance with an inability to walk greater than 100ft without worsening dyspnea.    Interval history, f/u chief complaint shortness of breath:  4/24: patient attempted repositioning himself in bed with notable increased work of breathing with associated wheezing noted on exam. Patient transitioned to Vapotherm 25/0.50 this morning and given IV Solu-Medrol with nebulizer treatment. Patient pulmonary status improved. Significant abdominal distention which is likely contributing to his underlying shortness of breath. Indicates some stool production yesterday; however, sounds minimal in nature in comparison to his abdominal imaging.   4/25: sedated at time of my exam; patient with improved air movement throughout on exam this morning; currently on Vapotherm 25/0.50 this morning. Patient became extremely anxious, tachypneic, and mildly tachycardic overnight. RT attempted to give scheduled neb tx, but patient had an episode of vomiting. Case and plan of care discussed with son at bedside this morning.   4/26: Patient reports he feels "terrible" this morning; complaints are vague in nature. Endorses generalized weakness. Pulmonary status significantly improved. On Vapotherm 15/0.35. Abdomen significantly softer from previous exam. By reports, several large size bowel movements yesterday afternoon.   4/28: Pulmonary status stable; increased dyspnea this am following his breathing treatment and oral meds. Minimal mobilization as reported by family at bedside. Patient and family encouraged to increase mobility. Weaning supplemental oxygen; currently on nasal cannula at 8L/NC. Denies productive cough    Objective:     Vital Signs (Most Recent):  Temp: 97.8 °F (36.6 °C) " (04/28/24 1210)  Pulse: 84 (04/28/24 1210)  Resp: 18 (04/28/24 1210)  BP: (!) 188/86 (04/28/24 1210)  SpO2: 95 % (04/28/24 1210) Vital Signs (24h Range):  Temp:  [97.6 °F (36.4 °C)-98.8 °F (37.1 °C)] 97.8 °F (36.6 °C)  Pulse:  [71-86] 84  Resp:  [15-18] 18  SpO2:  [92 %-99 %] 95 %  BP: (162-188)/() 188/86   Weight: 82 kg (180 lb 12.8 oz);  Body mass index is 25.22 kg/m².    Intake/Output Summary (Last 24 hours) at 4/28/2024 1249  Last data filed at 4/28/2024 0425  Gross per 24 hour   Intake 708.63 ml   Output 1100 ml   Net -391.37 ml      Physical Exam  Vitals and nursing note reviewed.   HENT:      Head: Normocephalic.   Eyes:      Conjunctiva/sclera: Conjunctivae normal.   Cardiovascular:      Rate and Rhythm: Normal rate.   Pulmonary:      Effort: Pulmonary effort is normal.      Breath sounds: Normal breath sounds.   Abdominal:      Palpations: Abdomen is soft.   Musculoskeletal:         General: Normal range of motion.      Cervical back: Normal range of motion and neck supple.   Skin:     General: Skin is warm and dry.   Neurological:      Mental Status: He is alert and oriented to person, place, and time.   Psychiatric:         Mood and Affect: Mood normal.         Review of Systems    Significant Labs:  CBC/Anemia Profile:  Recent Labs   Lab 04/27/24  0542 04/28/24  0404   WBC 18.61* 14.36*   HGB 10.4* 9.5*   HCT 31.7* 28.9*    362   MCV 95 97   RDW 12.9 13.2   Chemistries:  Recent Labs   Lab 04/27/24  0542      K 3.8      CO2 29   BUN 16   CREATININE 0.6   CALCIUM 9.5   ALBUMIN 2.6*   PROT 6.1   BILITOT 0.3   ALKPHOS 82   *   AST 83*   MG 2.1   ABG  Recent Labs   Lab 04/25/24  0036   PH 7.345*   PO2 84   PCO2 53.7*   HCO3 29.3*   BE 4*     Assessment/Plan:   Chronic respiratory failure with hypoxia  Bilateral pleural effusion  COPD exacerbation  Pneumonia  Pleuritic chest pain  Patient with chronic hypoxic respiratory failure on home oxygen of 2L/NC.     CT chest imaging in  September 2023 with marked emphysematous changes and some bronchial wall thickening. No evidence of mass / consolidation / nodules on imaging. Trace bilateral effusions noted   Repeat CT chest yesterday reviewed in comparison to prior imaging. Low suspicion for underlying malignancy in light of no evidence of prior mass, lesion, nodule. No mediastinal lymphadenopathy  Supplemental oxygen to maintain sats 90% or greater  Weaned off Vapotherm; currently on 8L/NC with sats 98% this morning  Sputum culture with Ree Albicans  Legionella and fungitell negative   Fungitell and fungal immuno studies pending  Completed Rocephin and Azith course  Follow chest imaging as needed  Follow fever and WBC trends  Complete Prednisone taper as ordered  CT abdomen with significant constipation which I suspect is complicating the patient's shortness of breath due to diaphragmatic impairment  Mobilize as able; OOB in chair  Left pleural effusion appears slightly larger from prior imaging; obtain CT chest in am for further evaluation; may need thoracentesis if larger or appears loculated  Close pulmonary follow-up upon discharge. May ultimately need bronchoscopy and further work-up of lung mass. Repeat imaging for clearance / improvement in 8-12 weeks optimal prior to further work-up.    Right lower lobe lung mass  CT chest imaging in September 2023 with marked emphysematous changes and some bronchial wall thickening. No evidence of mass / consolidation / nodules on imaging. Trace bilateral effusions noted   Repeat CTA chest on 4/22 reviewed in comparison to prior imaging. Low suspicion for underlying malignancy in light of no evidence of prior mass, lesion, nodule. No mediastinal lymphadenopathy  Repeat CT chest without contrast in the morning to evaluate for worsening pleural effusion; may ultimately need thoracentesis of left effusion  Sputum culture +Candida Albicans; not on immunosuppressive therapy. Does not need treatment for  candida pna. Some evidence of oral candida; diflucan for oral thrush  Differential diagnosis: decompensated heart failure, acute infection, malignancy, aspiration  Legionella and fungitell negative; fungal immuno studies pending  Completed course of Rocephin and Azith  Follow cultures and adjust antibiotics as appropriate  Follow chest imaging as needed  Follow fever and WBC trends    Chronic diastolic congestive heart failure  Patient with chronic diastolic heart failure.     Last echo 9/27/2023: FINDINGS: The left ventricle is not well visualized. Normal left ventricular cavity size. Low normal left ventricular systolic function. LVEF 50 - 55%. Mild concentric hypertrophy. The right ventricle is not well visualized. Normal right ventricular size. Normal right ventricular systolic function. The left atrium is normal in size. Mild to moderately dilated right atrium. The mitral valve is not well visualized. No or trivial mitral valve regurgitation. No mitral valve stenosis. The aortic valve appears to have a tricuspid configuration (suboptimal   visualization). The aortic valve is not well visualized. No or trivial aortic valve regurgitation. No aortic valve stenosis. Mild tricuspid valve regurgitation. No tricuspid valve stenosis. The estimated right ventricular systolic pressure/PASP is 35-40 mmHg. The pulmonic valve is not well visualized. No or trivial pulmonic valve regurgitation. No pulmonic valve stenosis.     Monitor for diuretic needs  Monitor I&O trends  Monitor hemodynamics closely    Deandra Hunt NP  Pulmonary and Critical Care  [facility]

## 2024-04-28 NOTE — SUBJECTIVE & OBJECTIVE
Interval History:  Patient seen and examined with family at bedside.  Much more alert and willing to participate.  Worked with physical therapy.    Review of Systems   Constitutional:  Positive for activity change, appetite change and fatigue.   Respiratory:  Positive for shortness of breath. Negative for chest tightness and wheezing.    Cardiovascular:  Negative for chest pain, palpitations and leg swelling.   Gastrointestinal:  Negative for abdominal pain (Improving).   Genitourinary:  Positive for difficulty urinating.   Musculoskeletal:  Positive for arthralgias.   Neurological:  Positive for weakness.   All other systems reviewed and are negative.    Objective:     Vital Signs (Most Recent):  Temp: 97.8 °F (36.6 °C) (04/28/24 1609)  Pulse: 78 (04/28/24 1653)  Resp: 18 (04/28/24 1653)  BP: (!) 197/92 (04/28/24 1609)  SpO2: 99 % (04/28/24 1653) Vital Signs (24h Range):  Temp:  [97.6 °F (36.4 °C)-98.8 °F (37.1 °C)] 97.8 °F (36.6 °C)  Pulse:  [71-86] 78  Resp:  [15-18] 18  SpO2:  [92 %-99 %] 99 %  BP: (162-197)/() 197/92     Weight: 82 kg (180 lb 12.8 oz)  Body mass index is 25.22 kg/m².    Intake/Output Summary (Last 24 hours) at 4/28/2024 1731  Last data filed at 4/28/2024 0425  Gross per 24 hour   Intake 708.63 ml   Output 1100 ml   Net -391.37 ml         Physical Exam  Vitals reviewed.   Constitutional:       Appearance: He is ill-appearing.   HENT:      Head: Normocephalic and atraumatic.      Mouth/Throat:      Mouth: Mucous membranes are moist.      Pharynx: Oropharynx is clear.   Eyes:      Extraocular Movements: Extraocular movements intact.      Conjunctiva/sclera: Conjunctivae normal.   Cardiovascular:      Rate and Rhythm: Normal rate and regular rhythm.      Pulses: Normal pulses.      Heart sounds: Normal heart sounds.   Pulmonary:      Effort: No respiratory distress.      Breath sounds: No rhonchi.   Abdominal:      General: Bowel sounds are normal. There is distension (less so).       Palpations: Abdomen is soft.   Genitourinary:     Comments: Urinary retention requiring Queen catheter placement  Musculoskeletal:         General: Normal range of motion.      Cervical back: Normal range of motion and neck supple.   Skin:     General: Skin is warm and dry.   Neurological:      General: No focal deficit present.      Mental Status: He is alert and oriented to person, place, and time. Mental status is at baseline.             Significant Labs: All pertinent labs within the past 24 hours have been reviewed.  CBC:   Recent Labs   Lab 04/27/24  0542 04/28/24  0404   WBC 18.61* 14.36*   HGB 10.4* 9.5*   HCT 31.7* 28.9*    362     CMP:   Recent Labs   Lab 04/27/24  0542      K 3.8      CO2 29      BUN 16   CREATININE 0.6   CALCIUM 9.5   PROT 6.1   ALBUMIN 2.6*   BILITOT 0.3   ALKPHOS 82   AST 83*   *   ANIONGAP 7*       Significant Imaging: I have reviewed all pertinent imaging results/findings within the past 24 hours.

## 2024-04-28 NOTE — PT/OT/SLP PROGRESS
Physical Therapy  Treatment    Jason Mayfield III   MRN: [id number]   Admitting Diagnosis: Chronic respiratory failure with hypoxia       PT Start Time: 1935     PT Stop Time: 1000    PT Total Time (min): 865 min       Billable Minutes:  Gait Training 15 and Therapeutic Activity 10    Treatment Type: Treatment  PT/PTA: PTA     Number of PTA visits since last PT visit: 1       General Precautions: Standard, fall  Orthopedic Precautions: N/A  Braces: N/A  Respiratory Status: Nasal cannula, flow 2 L/min    Spiritual, Cultural Beliefs, Rastafari Practices, Values that Affect Care: no    Subjective:  Communicated with VISH prior to session.      Pain/Comfort  Pain Rating 1: 0/10  Pain Rating Post-Intervention 1: 0/10    Treatment and Education:    BED MOB SBA    SIT<-->STAND CG FROM EOB AND FROM BS CHAIR WITH VC FOR UPPER EXTREMITY    FORWARD/BACKWARD AMBULATION WITH RW 3 X WITH 2 STEPS EACH TRIAL VC FOR SEQUENCE, SIDE STEPS TO THE LOWER EXTREMITY WITH 90 DEGREE TO TURN TO THE CHAIR    EDUCATED ON CALL DON'T FALL PROCEDURE AND OOB X 3 MEALS/DAY     HE PARTICIPATED WITH MORE ACTIVITY TODAY INDICATING THAT HE IS GETTING STRONGER. HE MAY BENEFIT FROM STAYING OOB MORE REGULARLY DURING THE DAY. REPORTS INCREASED FATIGUE AFTER THIS SESSION     AM-PAC 6 CLICK MOBILITY  How much help from another person does this patient currently need?   1 = Unable, Total/Dependent Assistance  2 = A lot, Maximum/Moderate Assistance  3 = A little, Minimum/Contact Guard/Supervision  4 = None, Modified [place]/Independent    Turning over in bed (including adjusting bedclothes, sheets and blankets)?: 4  Sitting down on and standing up from a chair with arms (e.g., wheelchair, bedside commode, etc.): 3  Moving from lying on back to sitting on the side of the bed?: 4  Moving to and from a bed to a chair (including a wheelchair)?: 3  Need to walk in hospital room?: 3  Climbing 3-5 steps with a railing?: 1  Basic Mobility Total Score: 18    AM-PAC  Raw Score CMS G-Code Modifier Level of Impairment Assistance   6 % Total / Unable   7 - 9 CM 80 - 100% Maximal Assist   10 - 14 CL 60 - 80% Moderate Assist   15 - 19 CK 40 - 60% Moderate Assist   20 - 22 CJ 20 - 40% Minimal Assist   23 CI 1-20% SBA / CGA   24 CH 0% Independent/ Mod I     Patient left up in chair with all lines intact, call button in reach, chair alarm on, and FAMILY present.    Assessment:  Jason Mayfield III is a 83 y.o. male with a medical diagnosis of Chronic respiratory failure with hypoxia and presents with .    Rehab identified problem list/impairments: weakness, impaired endurance, impaired self care skills, decreased lower extremity function, impaired functional mobility, gait instability, impaired cardiopulmonary response to activity    Rehab potential is fair.    Activity tolerance: Fair    Discharge recommendations: Moderate Intensity Therapy      Barriers to discharge:      Equipment recommendations: shower chair, walker, rolling, bedside commode     GOALS:   Multidisciplinary Problems       Physical Therapy Goals          Problem: Physical Therapy    Goal Priority Disciplines Outcome Goal Variances Interventions   Physical Therapy Goal     PT, PT/OT Progressing     Description: Goals to be met by 5/8/24.  1. Pt will complete bed mobility MOD I.  2. Pt will complete sit to stand MOD I.  3. Pt will ambulate 200ft MOD I using RW.  4. Pt will increase AMPAC score by 2 points to progress functional mobility.                       PLAN:    Patient to be seen 3 x/week to address the above listed problems via gait training, therapeutic activities, therapeutic exercises  Plan of Care expires: 05/08/24  Plan of Care reviewed with: patient, spouse         04/28/2024

## 2024-04-28 NOTE — PLAN OF CARE
Pt resting in bed .   Pt awake and alert . Hob elevated . .   BP elevated , MD informed , pt denies headache or dizziness  Wife and daughter at bedside   POC discussed .   Safety Measures in place   Chart check complete .   Will  continue to monitor .

## 2024-04-28 NOTE — ASSESSMENT & PLAN NOTE
Patient's anemia is currently controlled. Has not received any PRBCs to date. Etiology likely d/t  unknown, work up in progress  Current CBC reviewed-   Lab Results   Component Value Date    HGB 9.5 (L) 04/28/2024    HCT 28.9 (L) 04/28/2024     Monitor serial CBC and transfuse if patient becomes hemodynamically unstable, symptomatic or H/H drops below 7/21.    This is new finding, had normal H&H 6 months ago  Iron-deficiency, CEA negative

## 2024-04-28 NOTE — SUBJECTIVE & OBJECTIVE
Jason Mayfield is a 83-year-old male with a past medical history significant for hypertension, NSTEMI, chronic diastolic heart failure, COPD / emphysema, chronic hypoxic respiratory failure on home oxygen of 2L/NC, BPH, hypothyroidism, AAA, chronic back pain, neuropathy, daily alcohol use, and former tobacco abuse who presented for evaluation of left lower chest pain and shortness of breath. Patient and family endorses a dry non-productive cough which had been ongoing for approximately 5days. Denies fever / chills or diarrhea at home. Endorses constipation and reports no bowel movement for almost two weeks. On exam, some mild LUQ tenderness with palpation; however, no guarding noted. Abdomen firm to touch with faint minimal bowel sounds. Due to his severe pain, the patient reports difficulty with taking deep breaths. Pain exacerbated by cough / deep inspiration. CTA chest completed at time of presentation demonstrates extensive emphysematous changes, bilateral pleural effusions, and RLL consolidation / mass. Patient was admitted by hospital medicine and pulmonary consulted for evaluation.     Of note, patient is followed by Dr. Gant with [facility] pulmonology. 60-pack year smoking history. Previous CT imaging in September 2023 with marked emphysematous changes and some bronchial wall thickening. No evidence of mass / consolidation / nodules on imaging. Trace bilateral effusions noted. Patient is a resident in [place], [place] with reports of 3 cats and 2 dogs in their home.     Patient recently seen by Dr. Gant in March 2023 who felt his shortness of breath was multifactorial. Patient is noted to have moderate impairment on his pulmonary testing and felt his conditions were exacerbated by his underlying diastolic heart failure. In addition, it was felt a significant portion of his SOB was likely contributed to physical deconditioning as he demonstrates activity intolerance with an inability to walk greater  "than 100ft without worsening dyspnea.    Interval history, f/u chief complaint shortness of breath:  4/24: patient attempted repositioning himself in bed with notable increased work of breathing with associated wheezing noted on exam. Patient transitioned to Vapotherm 25/0.50 this morning and given IV Solu-Medrol with nebulizer treatment. Patient pulmonary status improved. Significant abdominal distention which is likely contributing to his underlying shortness of breath. Indicates some stool production yesterday; however, sounds minimal in nature in comparison to his abdominal imaging.   4/25: sedated at time of my exam; patient with improved air movement throughout on exam this morning; currently on Vapotherm 25/0.50 this morning. Patient became extremely anxious, tachypneic, and mildly tachycardic overnight. RT attempted to give scheduled neb tx, but patient had an episode of vomiting. Case and plan of care discussed with son at bedside this morning.   4/26: Patient reports he feels "terrible" this morning; complaints are vague in nature. Endorses generalized weakness. Pulmonary status significantly improved. On Vapotherm 15/0.35. Abdomen significantly softer from previous exam. By reports, several large size bowel movements yesterday afternoon.   4/28: Pulmonary status stable; increased dyspnea this am following his breathing treatment and oral meds. Minimal mobilization as reported by family at bedside. Patient and family encouraged to increase mobility. Weaning supplemental oxygen; currently on nasal cannula at 8L/NC. Denies productive cough    Objective:     Vital Signs (Most Recent):  Temp: 97.8 °F (36.6 °C) (04/28/24 1210)  Pulse: 84 (04/28/24 1210)  Resp: 18 (04/28/24 1210)  BP: (!) 188/86 (04/28/24 1210)  SpO2: 95 % (04/28/24 1210) Vital Signs (24h Range):  Temp:  [97.6 °F (36.4 °C)-98.8 °F (37.1 °C)] 97.8 °F (36.6 °C)  Pulse:  [71-86] 84  Resp:  [15-18] 18  SpO2:  [92 %-99 %] 95 %  BP: (162-188)/() " 188/86   Weight: 82 kg (180 lb 12.8 oz);  Body mass index is 25.22 kg/m².    Intake/Output Summary (Last 24 hours) at 4/28/2024 1249  Last data filed at 4/28/2024 0425  Gross per 24 hour   Intake 708.63 ml   Output 1100 ml   Net -391.37 ml      Physical Exam  Vitals and nursing note reviewed.   HENT:      Head: Normocephalic.   Eyes:      Conjunctiva/sclera: Conjunctivae normal.   Cardiovascular:      Rate and Rhythm: Normal rate.   Pulmonary:      Effort: Pulmonary effort is normal.      Breath sounds: Normal breath sounds.   Abdominal:      Palpations: Abdomen is soft.   Musculoskeletal:         General: Normal range of motion.      Cervical back: Normal range of motion and neck supple.   Skin:     General: Skin is warm and dry.   Neurological:      Mental Status: He is alert and oriented to person, place, and time.   Psychiatric:         Mood and Affect: Mood normal.         Review of Systems    Significant Labs:  CBC/Anemia Profile:  Recent Labs   Lab 04/27/24  0542 04/28/24  0404   WBC 18.61* 14.36*   HGB 10.4* 9.5*   HCT 31.7* 28.9*    362   MCV 95 97   RDW 12.9 13.2   Chemistries:  Recent Labs   Lab 04/27/24  0542      K 3.8      CO2 29   BUN 16   CREATININE 0.6   CALCIUM 9.5   ALBUMIN 2.6*   PROT 6.1   BILITOT 0.3   ALKPHOS 82   *   AST 83*   MG 2.1

## 2024-04-28 NOTE — PT/OT/SLP EVAL
Speech Language Pathology Evaluation  Bedside Swallow    Patient Name:  Jason Mayfield III   MRN:  [id number]  Admitting Diagnosis: Chronic respiratory failure with hypoxia    Recommendations:                 General Recommendations:  Dysphagia therapy  Diet recommendations:   ,   clear liquids per MD  Aspiration Precautions: Remain upright 30 minutes post meal, Small bites/sips, and Strict aspiration precautions   General Precautions: Standard,    Communication strategies:  provide increased time to answer    Assessment:     Jason Mayfield III is a 83 y.o. male with a medical diagnosis of chronic respiratory failure with hypoxia. Patient presents an SLP diagnosis of Dysphagia. He is at an increase risk of aspiration due to respiratory deficits and generalized weakness. He would benefit from continued skilled speech therapy services to monitor for s/s of aspiration.    History     Past Medical History:   Diagnosis Date    BPH (benign prostatic hyperplasia)     CHF (congestive heart failure)     Chronic back pain     Chronic hypoxic respiratory failure, on home oxygen therapy     COPD (chronic obstructive pulmonary disease)     Coronary artery disease     Daily consumption of alcohol     Fibroadenoma of breast     History of tobacco abuse     Hypertension     Hypothyroidism, unspecified     Infrarenal abdominal aortic aneurysm (AAA) without rupture     Kidney stones     Neuropathy     NSTEMI (non-ST elevated myocardial infarction)     Personal history of colonic polyps     Shingles (herpes zoster) polyneuropathy        Past Surgical History:   Procedure Laterality Date    ADENOIDECTOMY      APPENDECTOMY      COLONOSCOPY      LUMBAR DISCECTOMY      REPAIR, RETINAL DETACHMENT      SPINAL CORD STIMULATOR IMPLANT      TONSILLECTOMY         Chest X-Rays: EXAMINATION:  XR CHEST AP PORTABLE     CLINICAL HISTORY:  pneumonia;     TECHNIQUE:  Single frontal view of the chest was performed.     COMPARISON:  Multiple priors.      FINDINGS:  Small to moderate left pleural effusion new from the prior exam.  Associated consolidation possibly atelectasis or infection.     The cardiac silhouette is normal in size. The hilar and mediastinal contours are unremarkable.     Bones are intact.     Impression:     As above.     This report was flagged in Epic as abnormal.        Electronically signed by:Allen Ball  Date:                                            04/27/2024  Time:                                           08:25    Prior diet: Regular solids with thin liquids.    Subjective     Patient alert and seen at bedside with lunch. Multiple family members present. Patient denied any difficulty eating or drinking.    Respiratory Status: High flow, flow 15 L/min, concentration 40%    Objective:     Oral Musculature Evaluation   Generalized weakness    Bedside Swallow Eval:   Consistencies Assessed:  Thin liquids via cup and straw      Oral Phase:   WFL    Pharyngeal Phase:   no overt clinical signs/symptoms of aspiration  no overt clinical signs/symptoms of pharyngeal dysphagia    Treatment: BSE completed. Patient with generalized weakness of oral motor musculature upon OME. He required max encouragement for minimal intake due to c/o nausea and no appetitive. Observed intake of clear liquids via cup X2 and a straw X2 without any overt s/s of aspiration. However, unable to fully assess the safety of swallow due to limited intake and consistency restrictions. Therefore patient would benefit from further ST services.     Goals: TWP tolerate the least restrictive diet without any overt s/s of aspiration  TPW recall and/or follow aspiration precautions to decrease risk of aspiration        Plan:     Patient to be seen:    X2 week  Plan of Care expires:   5/4/24  Plan of Care reviewed with:    patient  SLP Follow-Up:     yes     Discharge recommendations:    Moderate intensity  Barriers to Discharge:  None    Time Tracking:     SLP Treatment Date:     4/27/2024  Speech Start Time:   11:47  Speech Stop Time:    12:03   Speech Total Time (min):   16 min    Billable Minutes: Eval Swallow and Oral Function 16    04/27/2024

## 2024-04-28 NOTE — ASSESSMENT & PLAN NOTE
CT chest imaging in September 2023 with marked emphysematous changes and some bronchial wall thickening. No evidence of mass / consolidation / nodules on imaging. Trace bilateral effusions noted   Repeat CTA chest on 4/22 reviewed in comparison to prior imaging. Low suspicion for underlying malignancy in light of no evidence of prior mass, lesion, nodule. No mediastinal lymphadenopathy  Repeat CT chest without contrast in the morning to evaluate for worsening pleural effusion; may ultimately need thoracentesis of left effusion  Sputum culture +Candida Albicans; not on immunosuppressive therapy. Does not need treatment for candida pna. Some evidence of oral candida; diflucan for oral thrush  Differential diagnosis: decompensated heart failure, acute infection, malignancy, aspiration  Legionella and fungitell negative; fungal immuno studies pending  Completed course of Rocephin and Azith  Follow cultures and adjust antibiotics as appropriate  Follow chest imaging as needed  Follow fever and WBC trends

## 2024-04-29 PROBLEM — R53.81 DEBILITY: Status: ACTIVE | Noted: 2024-04-29

## 2024-04-29 LAB
ALBUMIN SERPL BCP-MCNC: 2.4 G/DL (ref 3.5–5.2)
ALP SERPL-CCNC: 66 U/L (ref 55–135)
ALT SERPL W/O P-5'-P-CCNC: 68 U/L (ref 10–44)
ANION GAP SERPL CALC-SCNC: 5 MMOL/L (ref 8–16)
APPEARANCE FLD: NORMAL
AST SERPL-CCNC: 32 U/L (ref 10–40)
BASOPHILS NFR BLD: 0 % (ref 0–1.9)
BILIRUB DIRECT SERPL-MCNC: 0.1 MG/DL (ref 0.1–0.3)
BILIRUB SERPL-MCNC: 0.3 MG/DL (ref 0.1–1)
BODY FLD TYPE: NORMAL
BUN SERPL-MCNC: 13 MG/DL (ref 8–23)
CALCIUM SERPL-MCNC: 9 MG/DL (ref 8.7–10.5)
CHLORIDE SERPL-SCNC: 101 MMOL/L (ref 95–110)
CO2 SERPL-SCNC: 29 MMOL/L (ref 23–29)
COLOR FLD: NORMAL
CREAT SERPL-MCNC: 0.6 MG/DL (ref 0.5–1.4)
DIFFERENTIAL METHOD BLD: ABNORMAL
EOSINOPHIL NFR BLD: 1 % (ref 0–8)
ERYTHROCYTE [DISTWIDTH] IN BLOOD BY AUTOMATED COUNT: 13.1 % (ref 11.5–14.5)
EST. GFR  (NO RACE VARIABLE): >60 ML/MIN/1.73 M^2
GLUCOSE SERPL-MCNC: 94 MG/DL (ref 70–110)
HCT VFR BLD AUTO: 30.7 % (ref 40–54)
HGB BLD-MCNC: 9.8 G/DL (ref 14–18)
IMM GRANULOCYTES # BLD AUTO: ABNORMAL K/UL (ref 0–0.04)
IMM GRANULOCYTES NFR BLD AUTO: ABNORMAL % (ref 0–0.5)
INR PPP: 1 (ref 0.8–1.2)
LDH SERPL L TO P-CCNC: 217 U/L (ref 110–260)
LYMPHOCYTES NFR BLD: 20 % (ref 18–48)
LYMPHOCYTES NFR FLD MANUAL: 13 %
MCH RBC QN AUTO: 30.9 PG (ref 27–31)
MCHC RBC AUTO-ENTMCNC: 31.9 G/DL (ref 32–36)
MCV RBC AUTO: 97 FL (ref 82–98)
METAMYELOCYTES NFR BLD MANUAL: 2 %
MONOCYTES NFR BLD: 4 % (ref 4–15)
MYELOCYTES NFR BLD MANUAL: 2 %
NEUTROPHILS NFR BLD: 71 % (ref 38–73)
NEUTROPHILS NFR FLD MANUAL: 87 %
NRBC BLD-RTO: 0 /100 WBC
OVALOCYTES BLD QL SMEAR: ABNORMAL
PLATELET # BLD AUTO: 341 K/UL (ref 150–450)
PLATELET BLD QL SMEAR: ABNORMAL
PMV BLD AUTO: 9.5 FL (ref 9.2–12.9)
POIKILOCYTOSIS BLD QL SMEAR: SLIGHT
POLYCHROMASIA BLD QL SMEAR: ABNORMAL
POTASSIUM SERPL-SCNC: 4.4 MMOL/L (ref 3.5–5.1)
PROT SERPL-MCNC: 5.3 G/DL (ref 6–8.4)
PROTHROMBIN TIME: 11.5 SEC (ref 9–12.5)
RBC # BLD AUTO: 3.17 M/UL (ref 4.6–6.2)
SODIUM SERPL-SCNC: 135 MMOL/L (ref 136–145)
WBC # BLD AUTO: 14.17 K/UL (ref 3.9–12.7)
WBC # FLD: 2695 /CU MM

## 2024-04-29 PROCEDURE — 94664 DEMO&/EVAL PT USE INHALER: CPT

## 2024-04-29 PROCEDURE — 85027 COMPLETE CBC AUTOMATED: CPT | Performed by: NURSE PRACTITIONER

## 2024-04-29 PROCEDURE — 82042 OTHER SOURCE ALBUMIN QUAN EA: CPT | Performed by: NURSE PRACTITIONER

## 2024-04-29 PROCEDURE — 25000003 PHARM REV CODE 250: Performed by: NURSE PRACTITIONER

## 2024-04-29 PROCEDURE — 27000646 HC AEROBIKA DEVICE

## 2024-04-29 PROCEDURE — 88305 TISSUE EXAM BY PATHOLOGIST: CPT | Performed by: PATHOLOGY

## 2024-04-29 PROCEDURE — 36415 COLL VENOUS BLD VENIPUNCTURE: CPT | Performed by: INTERNAL MEDICINE

## 2024-04-29 PROCEDURE — 88112 CYTOPATH CELL ENHANCE TECH: CPT | Mod: 26,,, | Performed by: PATHOLOGY

## 2024-04-29 PROCEDURE — 87070 CULTURE OTHR SPECIMN AEROBIC: CPT | Performed by: NURSE PRACTITIONER

## 2024-04-29 PROCEDURE — 63700000 PHARM REV CODE 250 ALT 637 W/O HCPCS: Performed by: INTERNAL MEDICINE

## 2024-04-29 PROCEDURE — 88305 TISSUE EXAM BY PATHOLOGIST: CPT | Mod: 26,,, | Performed by: PATHOLOGY

## 2024-04-29 PROCEDURE — 87205 SMEAR GRAM STAIN: CPT | Performed by: NURSE PRACTITIONER

## 2024-04-29 PROCEDURE — 99900035 HC TECH TIME PER 15 MIN (STAT)

## 2024-04-29 PROCEDURE — 25000242 PHARM REV CODE 250 ALT 637 W/ HCPCS: Performed by: INTERNAL MEDICINE

## 2024-04-29 PROCEDURE — 80048 BASIC METABOLIC PNL TOTAL CA: CPT | Performed by: INTERNAL MEDICINE

## 2024-04-29 PROCEDURE — 84157 ASSAY OF PROTEIN OTHER: CPT | Performed by: NURSE PRACTITIONER

## 2024-04-29 PROCEDURE — 83615 LACTATE (LD) (LDH) ENZYME: CPT | Mod: 91 | Performed by: NURSE PRACTITIONER

## 2024-04-29 PROCEDURE — 94799 UNLISTED PULMONARY SVC/PX: CPT

## 2024-04-29 PROCEDURE — 63600175 PHARM REV CODE 636 W HCPCS: Performed by: INTERNAL MEDICINE

## 2024-04-29 PROCEDURE — 11000001 HC ACUTE MED/SURG PRIVATE ROOM

## 2024-04-29 PROCEDURE — 88112 CYTOPATH CELL ENHANCE TECH: CPT | Performed by: PATHOLOGY

## 2024-04-29 PROCEDURE — 85610 PROTHROMBIN TIME: CPT | Performed by: NURSE PRACTITIONER

## 2024-04-29 PROCEDURE — 97530 THERAPEUTIC ACTIVITIES: CPT | Mod: CQ

## 2024-04-29 PROCEDURE — 85007 BL SMEAR W/DIFF WBC COUNT: CPT | Performed by: NURSE PRACTITIONER

## 2024-04-29 PROCEDURE — 94640 AIRWAY INHALATION TREATMENT: CPT

## 2024-04-29 PROCEDURE — 83615 LACTATE (LD) (LDH) ENZYME: CPT | Performed by: NURSE PRACTITIONER

## 2024-04-29 PROCEDURE — 63600175 PHARM REV CODE 636 W HCPCS: Performed by: NURSE PRACTITIONER

## 2024-04-29 PROCEDURE — 97530 THERAPEUTIC ACTIVITIES: CPT

## 2024-04-29 PROCEDURE — 36415 COLL VENOUS BLD VENIPUNCTURE: CPT | Performed by: NURSE PRACTITIONER

## 2024-04-29 PROCEDURE — 89051 BODY FLUID CELL COUNT: CPT | Performed by: NURSE PRACTITIONER

## 2024-04-29 PROCEDURE — 94761 N-INVAS EAR/PLS OXIMETRY MLT: CPT

## 2024-04-29 PROCEDURE — 27000221 HC OXYGEN, UP TO 24 HOURS

## 2024-04-29 PROCEDURE — 25000003 PHARM REV CODE 250: Performed by: INTERNAL MEDICINE

## 2024-04-29 PROCEDURE — 80076 HEPATIC FUNCTION PANEL: CPT | Performed by: INTERNAL MEDICINE

## 2024-04-29 PROCEDURE — 94760 N-INVAS EAR/PLS OXIMETRY 1: CPT

## 2024-04-29 RX ORDER — ALBUTEROL SULFATE 0.83 MG/ML
2.5 SOLUTION RESPIRATORY (INHALATION) EVERY 6 HOURS
Status: DISCONTINUED | OUTPATIENT
Start: 2024-04-30 | End: 2024-05-02 | Stop reason: HOSPADM

## 2024-04-29 RX ORDER — ADHESIVE BANDAGE
30 BANDAGE TOPICAL ONCE
Status: COMPLETED | OUTPATIENT
Start: 2024-04-29 | End: 2024-04-29

## 2024-04-29 RX ORDER — POLYETHYLENE GLYCOL 3350 17 G/17G
17 POWDER, FOR SOLUTION ORAL 3 TIMES DAILY
Status: DISCONTINUED | OUTPATIENT
Start: 2024-04-29 | End: 2024-05-02 | Stop reason: HOSPADM

## 2024-04-29 RX ORDER — FLUCONAZOLE 100 MG/1
200 TABLET ORAL DAILY
Status: DISCONTINUED | OUTPATIENT
Start: 2024-04-30 | End: 2024-05-02 | Stop reason: HOSPADM

## 2024-04-29 RX ADMIN — HYDRALAZINE HYDROCHLORIDE 50 MG: 25 TABLET, FILM COATED ORAL at 05:04

## 2024-04-29 RX ADMIN — FINASTERIDE 5 MG: 5 TABLET, FILM COATED ORAL at 09:04

## 2024-04-29 RX ADMIN — ALBUTEROL SULFATE 2.5 MG: 2.5 SOLUTION RESPIRATORY (INHALATION) at 12:04

## 2024-04-29 RX ADMIN — SENNOSIDES AND DOCUSATE SODIUM 2 TABLET: 50; 8.6 TABLET ORAL at 09:04

## 2024-04-29 RX ADMIN — GUAIFENESIN 600 MG: 600 TABLET, EXTENDED RELEASE ORAL at 09:04

## 2024-04-29 RX ADMIN — HYDRALAZINE HYDROCHLORIDE 50 MG: 25 TABLET, FILM COATED ORAL at 03:04

## 2024-04-29 RX ADMIN — BUDESONIDE INHALATION 0.5 MG: 0.5 SUSPENSION RESPIRATORY (INHALATION) at 08:04

## 2024-04-29 RX ADMIN — HYDROCHLOROTHIAZIDE 12.5 MG: 12.5 TABLET ORAL at 09:04

## 2024-04-29 RX ADMIN — ALBUTEROL SULFATE 2.5 MG: 2.5 SOLUTION RESPIRATORY (INHALATION) at 05:04

## 2024-04-29 RX ADMIN — POLYETHYLENE GLYCOL 3350 17 G: 17 POWDER, FOR SOLUTION ORAL at 03:04

## 2024-04-29 RX ADMIN — POLYETHYLENE GLYCOL 3350 17 G: 17 POWDER, FOR SOLUTION ORAL at 09:04

## 2024-04-29 RX ADMIN — ALBUTEROL SULFATE 2.5 MG: 2.5 SOLUTION RESPIRATORY (INHALATION) at 04:04

## 2024-04-29 RX ADMIN — TAMSULOSIN HYDROCHLORIDE 0.4 MG: 0.4 CAPSULE ORAL at 09:04

## 2024-04-29 RX ADMIN — METOPROLOL TARTRATE 25 MG: 25 TABLET, FILM COATED ORAL at 09:04

## 2024-04-29 RX ADMIN — FLUCONAZOLE 100 MG: 100 TABLET ORAL at 09:04

## 2024-04-29 RX ADMIN — BISACODYL 10 MG: 10 SUPPOSITORY RECTAL at 09:04

## 2024-04-29 RX ADMIN — GABAPENTIN 1200 MG: 400 CAPSULE ORAL at 09:04

## 2024-04-29 RX ADMIN — PREDNISONE 20 MG: 20 TABLET ORAL at 09:04

## 2024-04-29 RX ADMIN — HYDRALAZINE HYDROCHLORIDE 50 MG: 25 TABLET, FILM COATED ORAL at 09:04

## 2024-04-29 RX ADMIN — ENOXAPARIN SODIUM 40 MG: 40 INJECTION SUBCUTANEOUS at 04:04

## 2024-04-29 RX ADMIN — MAGNESIUM HYDROXIDE 2400 MG: 400 SUSPENSION ORAL at 03:04

## 2024-04-29 RX ADMIN — ALBUTEROL SULFATE 2.5 MG: 2.5 SOLUTION RESPIRATORY (INHALATION) at 07:04

## 2024-04-29 RX ADMIN — BUDESONIDE INHALATION 0.5 MG: 0.5 SUSPENSION RESPIRATORY (INHALATION) at 07:04

## 2024-04-29 RX ADMIN — GABAPENTIN 1200 MG: 400 CAPSULE ORAL at 03:04

## 2024-04-29 RX ADMIN — ALBUTEROL SULFATE 2.5 MG: 2.5 SOLUTION RESPIRATORY (INHALATION) at 08:04

## 2024-04-29 NOTE — PT/OT/SLP PROGRESS
"Physical Therapy  Treatment    Jason Mayfield III   MRN: [id number]   Admitting Diagnosis: Chronic respiratory failure with hypoxia    PT Received On: 04/29/24  PT Start Time: 0910     PT Stop Time: 0935    PT Total Time (min): 25 min       Billable Minutes:  Therapeutic Activity 25    Treatment Type: Treatment  PT/PTA: PTA     Number of PTA visits since last PT visit: 2       General Precautions: Standard, fall  Orthopedic Precautions: N/A  Braces: N/A  Respiratory Status: Nasal cannula, flow 3 L/min    Spiritual, Cultural Beliefs, Temple Practices, Values that Affect Care: no    Subjective:  Communicated with patient's nurse, Cecille, and completed Epic chart review prior to session.  Patient agreed to PT session. "I can't breathe." (SpO2 checked intermittently throughout session; remained 89% or above)    Pain/Comfort  Pain Rating 1: 0/10  Pain Rating Post-Intervention 1: 0/10    Objective:   Patient found with: peripheral IV, telemetry, anderson catheter, oxygen    Supine > sit EOB: CGA    Forward scoot towards EOB: SBA    Seated EOB x 10 min total focusing on increased tolerance to upright position, core stability, trunk control and CV endurance.   Maintained self supported sitting balance with CGA  Educated patient on use of pursed lip breathing technique to address SOB.    STS from EOB > RW: Min A     Stand pivot T/F to chair w/ RW: Min A     Reviewed AROM TE to BLE including: hip flex/ext, knee flex/ext, ankle PF/DF  To be completed a minimum of 10 reps for each LE in order to promote return of function, strength and ROM.     Educated patient on importance of increased tolerance to upright position and direct impact on CV endurance and strength. Patient encouraged to sit up in chair/ EOB, for a minimum of 2 consecutive hours, 3x per day. Encouraged patient to perform AROM TE to BLE throughout the day within all available planes of motion. Re enforced importance of utilizing call light to meet needs in room and " not attempt to get up without staff assistance. Patient verbalized understanding and agreed to comply.       AM-PAC 6 CLICK MOBILITY  How much help from another person does this patient currently need?   1 = Unable, Total/Dependent Assistance  2 = A lot, Maximum/Moderate Assistance  3 = A little, Minimum/Contact Guard/Supervision  4 = None, Modified [place]/Independent    Turning over in bed (including adjusting bedclothes, sheets and blankets)?: 3  Sitting down on and standing up from a chair with arms (e.g., wheelchair, bedside commode, etc.): 3  Moving from lying on back to sitting on the side of the bed?: 3  Moving to and from a bed to a chair (including a wheelchair)?: 3  Need to walk in hospital room?: 1  Climbing 3-5 steps with a railing?: 1 (NT)  Basic Mobility Total Score: 14    AM-PAC Raw Score CMS G-Code Modifier Level of Impairment Assistance   6 % Total / Unable   7 - 9 CM 80 - 100% Maximal Assist   10 - 14 CL 60 - 80% Moderate Assist   15 - 19 CK 40 - 60% Moderate Assist   20 - 22 CJ 20 - 40% Minimal Assist   23 CI 1-20% SBA / CGA   24 CH 0% Independent/ Mod I     Patient left up in chair with call button in reach, chair alarm on, and visitor present.    Assessment:  Jason Myafield III is a 83 y.o. male with a medical diagnosis of Chronic respiratory failure with hypoxia and presents with overall decline in functional mobility. Patient would continue to benefit from skilled PT to address functional limitations listed below in order to return to PLOF/decrease caregiver burden. Gait not attempted today due to increased patient anxiety with SOB that worsened with exertion despite maintaining an acceptable O2 saturation.    Rehab identified problem list/impairments: weakness, impaired endurance, impaired self care skills, impaired functional mobility, gait instability, impaired balance, decreased upper extremity function, decreased lower extremity function, decreased safety awareness, decreased  ROM, impaired cardiopulmonary response to activity    Rehab potential is fair.    Activity tolerance: Fair    Discharge recommendations: Moderate Intensity Therapy      Barriers to discharge:      Equipment recommendations: to be determined by next level of care     GOALS:   Multidisciplinary Problems       Physical Therapy Goals          Problem: Physical Therapy    Goal Priority Disciplines Outcome Goal Variances Interventions   Physical Therapy Goal     PT, PT/OT Progressing     Description: Goals to be met by 5/8/24.  1. Pt will complete bed mobility MOD I.  2. Pt will complete sit to stand MOD I.  3. Pt will ambulate 200ft MOD I using RW.  4. Pt will increase AMPAC score by 2 points to progress functional mobility.                       PLAN:    Patient to be seen 3 x/week to address the above listed problems via gait training, therapeutic activities, therapeutic exercises  Plan of Care expires: 05/08/24  Plan of Care reviewed with: patient         04/29/2024

## 2024-04-29 NOTE — ASSESSMENT & PLAN NOTE
Patient with chronic hypoxic respiratory failure on home oxygen of 2L/NC.     CT chest imaging in September 2023 with marked emphysematous changes and some bronchial wall thickening, no evidence of mass / consolidation / nodules, trace bilateral effusions noted   Repeat CT chest April 2024 with low suspicion for underlying malignancy in light of no evidence of prior mass, lesion, nodule; no mediastinal lymphadenopathy  Supplemental oxygen to maintain sats 90% or greater  Sputum culture with Candida Albicans, on fluconazole   Legionella and fungitell negative   s/p course of Rocephin and Azith course  Repeat chest CT ordered for 4/29 unable to be completed as pt reports he could not lie flat s/t SOB, based on most recent CXR will ask IR to eval for thora - labs/cytology have been signed and held  Abd distention improved after aggressive bowel regimen instituted   Mobilize as able, OOB in chair, pt and family report poor activity tolerance at baseline and mostly sedentary lifestyle   Follows with Dr. Gant at [facility], will need close pulmonary follow-up upon discharge with repeat imaging in 8-12 weeks, may ultimately need bronchoscopy and further work-up of lung mass

## 2024-04-29 NOTE — ASSESSMENT & PLAN NOTE
Mass in the right lower lobe in the roseanne fissural region measures 6.2 by 10 cm worrisome for malignancy  Uncertain as his last CT scan in September was negative we will continue to follow  F/u with Dr. Gant

## 2024-04-29 NOTE — SUBJECTIVE & OBJECTIVE
Jason Mayfield is a 83-year-old male with a past medical history significant for hypertension, NSTEMI, chronic diastolic heart failure, COPD / emphysema, chronic hypoxic respiratory failure on home oxygen of 2L/NC, BPH, hypothyroidism, AAA, chronic back pain, neuropathy, daily alcohol use, and former tobacco abuse who presented for evaluation of left lower chest pain and shortness of breath. Patient and family endorses a dry non-productive cough which had been ongoing for approximately 5days. Denies fever / chills or diarrhea at home. Endorses constipation and reports no bowel movement for almost two weeks. On exam, some mild LUQ tenderness with palpation; however, no guarding noted. Abdomen firm to touch with faint minimal bowel sounds. Due to his severe pain, the patient reports difficulty with taking deep breaths. Pain exacerbated by cough / deep inspiration. CTA chest completed at time of presentation demonstrates extensive emphysematous changes, bilateral pleural effusions, and RLL consolidation / mass. Patient was admitted by hospital medicine and pulmonary consulted for evaluation.     Of note, patient is followed by Dr. Gant with [facility] pulmonology. 60-pack year smoking history. Previous CT imaging in September 2023 with marked emphysematous changes and some bronchial wall thickening. No evidence of mass / consolidation / nodules on imaging. Trace bilateral effusions noted. Patient is a resident in [place], [place] with reports of 3 cats and 2 dogs in their home.     Patient recently seen by Dr. Gant in March 2023 who felt his shortness of breath was multifactorial. Patient is noted to have moderate impairment on his pulmonary testing and felt his conditions were exacerbated by his underlying diastolic heart failure. In addition, it was felt a significant portion of his SOB was likely contributed to physical deconditioning as he demonstrates activity intolerance with an inability to walk greater  "than 100ft without worsening dyspnea.    Interval history, f/u chief complaint shortness of breath:  4/24: patient attempted repositioning himself in bed with notable increased work of breathing with associated wheezing noted on exam. Patient transitioned to Vapotherm 25/0.50 this morning and given IV Solu-Medrol with nebulizer treatment. Patient pulmonary status improved. Significant abdominal distention which is likely contributing to his underlying shortness of breath. Indicates some stool production yesterday; however, sounds minimal in nature in comparison to his abdominal imaging.   4/25: sedated at time of my exam; patient with improved air movement throughout on exam this morning; currently on Vapotherm 25/0.50 this morning. Patient became extremely anxious, tachypneic, and mildly tachycardic overnight. RT attempted to give scheduled neb tx, but patient had an episode of vomiting. Case and plan of care discussed with son at bedside this morning.   4/26: Patient reports he feels "terrible" this morning; complaints are vague in nature. Endorses generalized weakness. Pulmonary status significantly improved. On Vapotherm 15/0.35. Abdomen significantly softer from previous exam. By reports, several large size bowel movements yesterday afternoon.   4/28: Pulmonary status stable; increased dyspnea this am following his breathing treatment and oral meds. Minimal mobilization as reported by family at bedside. Patient and family encouraged to increase mobility. Weaning supplemental oxygen; currently on nasal cannula at 8L/NC. Denies productive cough  4/29: pt reports he was unable to get his CT this AM bc he could not lie flat s/t SOB, remains on 4L NC, appears overall quite weak and debilitated - after discussion with pt and family it appears pt has had a downward trajectory since a hospitalization in last 2023, no appetite     ROS complete and negative unless stated in the interval HPI     Objective:     Vital Signs " (Most Recent):  Temp: 97.6 °F (36.4 °C) (04/29/24 1135)  Pulse: 73 (04/29/24 1349)  Resp: 18 (04/29/24 1259)  BP: (!) 141/65 (04/29/24 1135)  SpO2: (!) 94 % (04/29/24 1259) Vital Signs (24h Range):  Temp:  [97.6 °F (36.4 °C)-98.2 °F (36.8 °C)] 97.6 °F (36.4 °C)  Pulse:  [] 73  Resp:  [16-18] 18  SpO2:  [94 %-99 %] 94 %  BP: (134-197)/(65-92) 141/65     Weight: 82 kg (180 lb 12.8 oz)  Body mass index is 25.22 kg/m².      Intake/Output Summary (Last 24 hours) at 4/29/2024 1413  Last data filed at 4/29/2024 0424  Gross per 24 hour   Intake 360 ml   Output 1800 ml   Net -1440 ml        Physical Exam  Vitals reviewed.   Constitutional:       General: He is awake. He is not in acute distress.     Appearance: He is ill-appearing.      Comments: Elderly male that appears chronically ill with overall weakness and debility    Pulmonary:      Breath sounds: Decreased breath sounds present.      Comments: On NC, reports SOB when lying flat   Neurological:      Mental Status: He is alert.   Psychiatric:         Behavior: Behavior is cooperative.               Vents:  Oxygen Concentration (%): 40 (04/28/24 0017)    Lines/Drains/Airways       Drain  Duration                  Urethral Catheter 04/25/24 1745 Silicone 16 Fr. 3 days              Peripheral Intravenous Line  Duration                  Peripheral IV - Single Lumen 04/28/24 0600 22 G Anterior;Right Forearm 1 day                    Significant Labs:    CBC/Anemia Profile:  Recent Labs   Lab 04/28/24  0404 04/29/24  0503   WBC 14.36* 14.17*   HGB 9.5* 9.8*   HCT 28.9* 30.7*    341   MCV 97 97   RDW 13.2 13.1        Chemistries:  Recent Labs   Lab 04/29/24  0503   *   K 4.4      CO2 29   BUN 13   CREATININE 0.6   CALCIUM 9.0   ALBUMIN 2.4*   PROT 5.3*   BILITOT 0.3   ALKPHOS 66   ALT 68*   AST 32       All pertinent labs within the past 24 hours have been reviewed.    Significant Imaging:  I have reviewed all pertinent imaging results/findings within  the past 24 hours.

## 2024-04-29 NOTE — NURSING
Pt remains free of falls/injury. Safety precautions maintained. Pt denies pain/discomfort.   Pt sat in chair for breakfast and lunch.  Full liquid diet tolerated. Pt tolerated well.  VSS. No S/S of distress noted at this time. Bed alarm set.12 hour chart check complete. Will continue to monitor.

## 2024-04-29 NOTE — PROGRESS NOTES
[facility]  [facility] Medicine  Progress Note    Patient Name: Jason Mayfield III  MRN: [id number]  Patient Class: IP- Inpatient   Admission Date: 4/22/2024  Length of Stay: 7 days  Attending Physician: Jacques Hayden MD  Primary Care Provider: TANNER Mane MD        Subjective:     Principal Problem:Acute on chronic respiratory failure with hypoxia        HPI:  Patient is 83-year-old male with past medical history significant for hypertension, NSTEMI, diastolic congestive heart failure, COPD, chronic hypoxic respiratory failure on home O2 @ 2L/min NC, BPH,  fibroadenoma of breast, hypothyroidism, infrarenal AAA(3.4 cm),  chronic back pain, neuropathy, shingles infection, kidney stones, colonic polyps, daily alcohol use, and former tobacco abuse who presented to ED with complaints of chest pain and dyspnea.  He reports  that for the past 2-3 days he has been having left upper chest pain, moderate, worsened with deep breathing, chronic cough, with no radiation into neck, jaw, or arms.  He also reports some generalized weakness and trouble walking over the past 2-3 days as well.  He reports some wheezing although states that this is his baseline. He denies fever, chills , abdominal pain, nausea, vomiting, diarrhea, leg pain, dysuria, or worsening edema. he states that he is normally on 2 L per minute nasal cannula, however over the past couple of days he has turned it up to 3-4 liters/minute.  He is followed by Dr. Gant.  Vital signs on arrival to ED-[facility]  with 98.3 temp, heart rate 107, respiratory rate 25, blood pressure 135/62, 91% SpO2 on 4 liters/minute nasal cannula.  He has remained afebrile while in ED. oxygen has been weaned to 3 liters/minute nasal cannula with SpO2 96%.  Lab workup reveals WBC 17.33, hemoglobin 11.5, hematocrit 34.5, sodium 131, potassium 4.7, chloride 93, CO2 24, BUN 19, creatinine 0.8, glucose 129, normal LFTs, BNP 85, troponin 0.010, lactic acid 1.0, procalcitonin 0.09,  and  normal urinalysis.  EKG:  Sinus tach with first-degree AV block and right bundle-branch block, heart rate 102.  Chest x-ray notes thoracic spinal cord stimulator leads, normal heart size, mild aortic atherosclerosis, mild volume loss with vascular crowding in both lung bases.  CTA of chest shows extensive emphysema, moderate left-sided pleural effusion, mild right-sided pleural effusion, moderate right basilar atelectasis versus consolidation and mild left basilar atelectasis versus consolidation, suggestion of mass in the right lower lobe perifissural region measuring 6.2 x 10 cm, worrisome for malignancy.  He was given Rocephin, DuoNeb, Solu-Medrol, gabapentin, and tramadol while in ED. Hospital Medicine was consulted for admission due to worsening hypoxic respiratory failure, pneumonia, and newly discovered lung mass.    Overview/Hospital Course:  Patient is an 83-year-old male with a history of hypertension, coronary artery disease, diastolic dysfunction, COPD with chronic hypoxic respiratory on 2 L nasal cannula, BPH, chronic back pain, neuropathy, daily alcohol use who presented emergency room with worsening left upper chest pain that was pleuritic in nature.  He says that he has had worsening generalized weakness and trouble walking over 2-3 days prior to admission.  CTA of the chest revealed extensive emphysema moderate left-sided pleural effusion mild right-sided pleural effusion with right-sided atelectasis versus consolidation.  He was a question of a mass in the right lower lobe measuring 6.2 x 10 cm.  He was admitted after cultures were obtained started on IV antibiotics for possible aspiration pneumonia.  Patient was attempting to eat breakfast and reposition himself in bed with increased work of breathing and wheezing on exam.  Pulmonary evaluated the patient and he was started on Vapotherm 25/50 Solu-Medrol IV and nebulizer.  Patient's shortness of breath improved.  He still has significant abdominal  distention with minimal stool output.    Constipation-patient has had no bowel movement for 10 days prior to admission.  X-rays revealed large amount of stool.  He received multiple cathartics including to enemas.  He is continuing to have bowel movements with softening of his abdomen.    Patient was oxygen requirements have continued to improve.  He continues on steroid taper.  As well as neb treatments.  Nonproductive cough.  Per Pulmonary  Left pleural effusion appears slightly larger from prior imaging; obtain CT chest in am for further evaluation; may need thoracentesis if larger or appears loculated  Close pulmonary follow-up upon discharge. May ultimately need bronchoscopy and further work-up of lung mass. Repeat imaging for clearance / improvement in 8-12 weeks optimal prior to further work-up.  Patient has continued to be encouraged to participate with physical therapy and be out of bed 2-3 times per day.    Unable to lay flat for CT chest. Pulmonology recommended U/S of chest with plans for thoracentesis-pending. CM consulted for rehab placement.  No BM x 2 days- increase Miralax to TID and add MOM x one dose           Interval History:  Patient seen and examined with family at bedside.  AAOX3, participating in therapy. SOB when laying flat- could not tolerate CT scan of chest. Pulm recommended U/S of chest. Rehab consult     Review of Systems   Constitutional:  Positive for activity change, appetite change and fatigue. Negative for chills, diaphoresis and fever.   HENT:  Negative for congestion, nosebleeds, sore throat and trouble swallowing.    Eyes:  Negative for pain, discharge and visual disturbance.   Respiratory:  Positive for shortness of breath. Negative for apnea, cough, chest tightness, wheezing and stridor.    Cardiovascular:  Negative for chest pain, palpitations and leg swelling.   Gastrointestinal:  Negative for abdominal distention, abdominal pain (Improving), blood in stool, constipation,  diarrhea, nausea and vomiting.   Endocrine: Negative for cold intolerance and heat intolerance.   Genitourinary:  Positive for difficulty urinating. Negative for dysuria, flank pain, frequency and urgency.   Musculoskeletal:  Positive for arthralgias. Negative for back pain, joint swelling, myalgias, neck pain and neck stiffness.   Skin:  Negative for rash and wound.   Allergic/Immunologic: Negative for food allergies and immunocompromised state.   Neurological:  Positive for weakness. Negative for dizziness, seizures, syncope, facial asymmetry, light-headedness and headaches.   Hematological:  Negative for adenopathy.   Psychiatric/Behavioral:  Negative for agitation, behavioral problems and confusion. The patient is not nervous/anxious.    All other systems reviewed and are negative.    Objective:     Vital Signs (Most Recent):  Temp: 97.6 °F (36.4 °C) (04/29/24 1135)  Pulse: 73 (04/29/24 1349)  Resp: 18 (04/29/24 1259)  BP: (!) 141/65 (04/29/24 1135)  SpO2: (!) 94 % (04/29/24 1259) Vital Signs (24h Range):  Temp:  [97.6 °F (36.4 °C)-98.2 °F (36.8 °C)] 97.6 °F (36.4 °C)  Pulse:  [] 73  Resp:  [16-18] 18  SpO2:  [94 %-99 %] 94 %  BP: (134-197)/(65-92) 141/65     Weight: 82 kg (180 lb 12.8 oz)  Body mass index is 25.22 kg/m².    Intake/Output Summary (Last 24 hours) at 4/29/2024 1501  Last data filed at 4/29/2024 0424  Gross per 24 hour   Intake 360 ml   Output 1800 ml   Net -1440 ml         Physical Exam  Vitals reviewed.   Constitutional:       Appearance: He is ill-appearing.   HENT:      Head: Normocephalic and atraumatic.      Mouth/Throat:      Mouth: Mucous membranes are moist.      Pharynx: Oropharynx is clear.   Eyes:      Extraocular Movements: Extraocular movements intact.      Conjunctiva/sclera: Conjunctivae normal.   Cardiovascular:      Rate and Rhythm: Normal rate and regular rhythm.      Pulses: Normal pulses.      Heart sounds: Normal heart sounds.   Pulmonary:      Effort: No respiratory  distress.      Breath sounds: Decreased breath sounds (left mid to lwoer lobes) present. No rhonchi.   Abdominal:      General: Bowel sounds are normal. There is distension (less so).      Palpations: Abdomen is soft.   Genitourinary:     Comments: Urinary retention requiring Queen catheter placement  Musculoskeletal:         General: Normal range of motion.      Cervical back: Normal range of motion and neck supple.   Skin:     General: Skin is warm and dry.   Neurological:      General: No focal deficit present.      Mental Status: He is alert and oriented to person, place, and time. Mental status is at baseline.             Significant Labs: All pertinent labs within the past 24 hours have been reviewed.  CBC:   Recent Labs   Lab 04/28/24  0404 04/29/24  0503   WBC 14.36* 14.17*   HGB 9.5* 9.8*   HCT 28.9* 30.7*    341     CMP:   Recent Labs   Lab 04/29/24  0503   *   K 4.4      CO2 29   GLU 94   BUN 13   CREATININE 0.6   CALCIUM 9.0   PROT 5.3*   ALBUMIN 2.4*   BILITOT 0.3   ALKPHOS 66   AST 32   ALT 68*   ANIONGAP 5*       Significant Imaging: I have reviewed all pertinent imaging results/findings within the past 24 hours.    Assessment/Plan:      * Acute on chronic respiratory failure with hypoxia  Patient with Hypoxic Respiratory failure which is Acute on chronic.  he is on home oxygen at 2 LPM. Supplemental oxygen was provided and noted-      .   Signs/symptoms of respiratory failure include- tachypnea, increased work of breathing, use of accessory muscles, and wheezing. Contributing diagnoses includes - COPD, Pleural effusion, Pneumonia, and lung mass  Labs and images were reviewed. Patient Has not had a recent ABG. Will treat underlying causes and adjust management of respiratory failure as follows-     Was given Solu-medrol x one dose per ED, with increased shortness breath and wheezing Solu-Medrol as we will do every 6 hours in addition to nebs.  Supplemental oxygen has been increased  to Vapotherm at 10/50.      Continue supplemental oxygen.  We will treat with antibiotics, nebs, pulmonary hygiene    Pneumonia  We will treat for pneumonia with Rocephin and Zithromax at this time  Blood cultures show NGTD  Sputum culture grew Yeast- cont Fluconazole   Cont IV Rocephin and Azithromycin       Right lower lobe lung mass  Mass in the right lower lobe in the roseanne fissural region measures 6.2 by 10 cm worrisome for malignancy  Uncertain as his last CT scan in September was negative we will continue to follow  F/u with Dr. Gant         Bilateral pleural effusion  Patient found to have moderate pleural effusion on imaging. I have personally reviewed and interpreted the following imaging: Xray and CT. A thoracentesis was deferred pending pulmonology consultation.  Most likely etiology includes  possible malignancy with new lung mass discovered on CT scan, and pneumonia      Repeat CT scan in am-pt could not tolerate CT due to SOB   Pulm recommended chest u/s and thoracentesis today      Constipation   Patient with significant constipation and large stool burden.  Patient given multiple laxatives and enemas with some results  GI consult appreciated  Continue  MiraLax, Dulcolax suppository  Encourage patient to mobilize  Add MOM x one dose       Anemia  Patient's anemia is currently controlled. Has not received any PRBCs to date. Etiology likely d/t  unknown, work up in progress  Current CBC reviewed-   Lab Results   Component Value Date    HGB 9.5 (L) 04/28/2024    HCT 28.9 (L) 04/28/2024     Monitor serial CBC and transfuse if patient becomes hemodynamically unstable, symptomatic or H/H drops below 7/21.    This is new finding, had normal H&H 6 months ago  Iron-deficiency, CEA negative        Hyponatremia  Patient has hyponatremia which is controlled,We will aim to correct the sodium by 4-6mEq in 24 hours. We will monitor sodium Daily. The hyponatremia is due to Dehydration/hypovolemia. We will treat the  hyponatremia with IV fluids as follows: NS at 75 ml/hr. The patient's sodium results have been reviewed and are listed below.    We will continue to trend    BPH (benign prostatic hyperplasia)  Continue finasteride    Patient with acute urinary retention requiring Queen catheter placement  Overnight 4/26 patient had episode of confusion and attempted to pull Queen out.  Since then he has had some hematuria.  Have asked nurses to flush.    Chronic diastolic congestive heart failure  Patient is identified as having Diastolic (HFpEF) heart failure that is Chronic. CHF is currently controlled. Latest ECHO performed and demonstrates- No results found for this or any previous visit.  Monitor clinical status closely. Monitor on telemetry. Patient is off CHF pathway.  Monitor strict Is&Os and daily weights.  Fluid restriction not indicated at this time. Cardiology has not been consulted. Continue to stress to patient importance of self efficacy and  on diet for CHF. Last BNP reviewed- and noted below   Recent Labs   Lab 04/25/24  0026   *       Holding home Lasix 20 mg daily at this time as patient appears dry on exam    COPD exacerbation  Patient's COPD is with exacerbation noted by continued dyspnea and worsening of baseline hypoxia currently.  Patient is currently off COPD Pathway. Continue scheduled inhalers Antibiotics and Supplemental oxygen and monitor respiratory status closely.   Was given 1 dose of IV Solu-Medrol with significant improvement, we will hold off on additional steroids at this time, add back PRN  Nebs ordered every 6 hours    Primary hypertension  Chronic, controlled. Latest blood pressure and vitals reviewed-     Temp:  [97.6 °F (36.4 °C)-98.8 °F (37.1 °C)]   Pulse:  [71-86]   Resp:  [15-18]   BP: (162-197)/()   SpO2:  [92 %-99 %] .   Home meds for hypertension were reviewed and noted below.   Hypertension Medications               furosemide (LASIX) 20 MG tablet Take 20 mg by  mouth.    hydrALAZINE (APRESOLINE) 50 MG tablet Take 50 mg by mouth 2 (two) times daily.            While in the hospital, will manage blood pressure as follows; trend    Will utilize p.r.n. blood pressure medication only if patient's blood pressure greater than 180/110 and he develops symptoms such as worsening chest pain or shortness of breath.    Pleuritic chest pain  Likely related to pneumonia/pleural effusions and coughing  Initial troponin is normal, we will trend  EKG reviewed, sinus tach with first-degree AV block and right bundle branch block, no concerning ST or T-wave changes  Cardiac monitoring  Resolved        VTE Risk Mitigation (From admission, onward)           Ordered     enoxaparin injection 40 mg  Every 24 hours         04/27/24 1719     Reason for No Pharmacological VTE Prophylaxis  Once        Comments: Possible procedure   Question:  Reasons:  Answer:  Physician Provided (leave comment)    04/23/24 0030     IP VTE HIGH RISK PATIENT  Once         04/23/24 0030     Place sequential compression device  Until discontinued         04/23/24 0030                    Discharge Planning   ERIKA:      Code Status: Full Code   Is the patient medically ready for discharge?:     Reason for patient still in hospital (select all that apply): Patient trending condition  Discharge Plan A: Skilled Nursing Facility                  Lynnette Queen NP  Department of Hospital Medicine   [facility]

## 2024-04-29 NOTE — PROGRESS NOTES
[facility] - Med Surg 3  Pulmonology  Progress Note    Patient Name: Jason Mayfield III  MRN: [id number]  Admission Date: 4/22/2024  Hospital Length of Stay: 7 days  Code Status: Full Code  Attending Provider: Jacques Hayden MD  Primary Care Provider: TANNER Mane MD   Principal Problem: Chronic respiratory failure with hypoxia    Subjective:     Jason Mayfield is a 83-year-old male with a past medical history significant for hypertension, NSTEMI, chronic diastolic heart failure, COPD / emphysema, chronic hypoxic respiratory failure on home oxygen of 2L/NC, BPH, hypothyroidism, AAA, chronic back pain, neuropathy, daily alcohol use, and former tobacco abuse who presented for evaluation of left lower chest pain and shortness of breath. Patient and family endorses a dry non-productive cough which had been ongoing for approximately 5days. Denies fever / chills or diarrhea at home. Endorses constipation and reports no bowel movement for almost two weeks. On exam, some mild LUQ tenderness with palpation; however, no guarding noted. Abdomen firm to touch with faint minimal bowel sounds. Due to his severe pain, the patient reports difficulty with taking deep breaths. Pain exacerbated by cough / deep inspiration. CTA chest completed at time of presentation demonstrates extensive emphysematous changes, bilateral pleural effusions, and RLL consolidation / mass. Patient was admitted by hospital medicine and pulmonary consulted for evaluation.     Of note, patient is followed by Dr. Gant with [facility] pulmonology. 60-pack year smoking history. Previous CT imaging in September 2023 with marked emphysematous changes and some bronchial wall thickening. No evidence of mass / consolidation / nodules on imaging. Trace bilateral effusions noted. Patient is a resident in [place], [place] with reports of 3 cats and 2 dogs in their home.     Patient recently seen by Dr. Gant in March 2023 who felt his shortness of breath was  "multifactorial. Patient is noted to have moderate impairment on his pulmonary testing and felt his conditions were exacerbated by his underlying diastolic heart failure. In addition, it was felt a significant portion of his SOB was likely contributed to physical deconditioning as he demonstrates activity intolerance with an inability to walk greater than 100ft without worsening dyspnea.    Interval history, f/u chief complaint shortness of breath:  4/24: patient attempted repositioning himself in bed with notable increased work of breathing with associated wheezing noted on exam. Patient transitioned to Vapotherm 25/0.50 this morning and given IV Solu-Medrol with nebulizer treatment. Patient pulmonary status improved. Significant abdominal distention which is likely contributing to his underlying shortness of breath. Indicates some stool production yesterday; however, sounds minimal in nature in comparison to his abdominal imaging.   4/25: sedated at time of my exam; patient with improved air movement throughout on exam this morning; currently on Vapotherm 25/0.50 this morning. Patient became extremely anxious, tachypneic, and mildly tachycardic overnight. RT attempted to give scheduled neb tx, but patient had an episode of vomiting. Case and plan of care discussed with son at bedside this morning.   4/26: Patient reports he feels "terrible" this morning; complaints are vague in nature. Endorses generalized weakness. Pulmonary status significantly improved. On Vapotherm 15/0.35. Abdomen significantly softer from previous exam. By reports, several large size bowel movements yesterday afternoon.   4/28: Pulmonary status stable; increased dyspnea this am following his breathing treatment and oral meds. Minimal mobilization as reported by family at bedside. Patient and family encouraged to increase mobility. Weaning supplemental oxygen; currently on nasal cannula at 8L/NC. Denies productive cough  4/29: pt reports he " was unable to get his CT this AM bc he could not lie flat s/t SOB, remains on 4L NC, appears overall quite weak and debilitated - after discussion with pt and family it appears pt has had a downward trajectory since a hospitalization in last 2023, no appetite     ROS complete and negative unless stated in the interval HPI     Objective:     Vital Signs (Most Recent):  Temp: 97.6 °F (36.4 °C) (04/29/24 1135)  Pulse: 73 (04/29/24 1349)  Resp: 18 (04/29/24 1259)  BP: (!) 141/65 (04/29/24 1135)  SpO2: (!) 94 % (04/29/24 1259) Vital Signs (24h Range):  Temp:  [97.6 °F (36.4 °C)-98.2 °F (36.8 °C)] 97.6 °F (36.4 °C)  Pulse:  [] 73  Resp:  [16-18] 18  SpO2:  [94 %-99 %] 94 %  BP: (134-197)/(65-92) 141/65     Weight: 82 kg (180 lb 12.8 oz)  Body mass index is 25.22 kg/m².      Intake/Output Summary (Last 24 hours) at 4/29/2024 1413  Last data filed at 4/29/2024 0424  Gross per 24 hour   Intake 360 ml   Output 1800 ml   Net -1440 ml        Physical Exam  Vitals reviewed.   Constitutional:       General: He is awake. He is not in acute distress.     Appearance: He is ill-appearing.      Comments: Elderly male that appears chronically ill with overall weakness and debility    Pulmonary:      Breath sounds: Decreased breath sounds present.      Comments: On NC, reports SOB when lying flat   Neurological:      Mental Status: He is alert.   Psychiatric:         Behavior: Behavior is cooperative.               Vents:  Oxygen Concentration (%): 40 (04/28/24 0017)    Lines/Drains/Airways       Drain  Duration                  Urethral Catheter 04/25/24 1745 Silicone 16 Fr. 3 days              Peripheral Intravenous Line  Duration                  Peripheral IV - Single Lumen 04/28/24 0600 22 G Anterior;Right Forearm 1 day                    Significant Labs:    CBC/Anemia Profile:  Recent Labs   Lab 04/28/24  0404 04/29/24  0503   WBC 14.36* 14.17*   HGB 9.5* 9.8*   HCT 28.9* 30.7*    341   MCV 97 97   RDW 13.2 13.1         Chemistries:  Recent Labs   Lab 04/29/24  0503   *   K 4.4      CO2 29   BUN 13   CREATININE 0.6   CALCIUM 9.0   ALBUMIN 2.4*   PROT 5.3*   BILITOT 0.3   ALKPHOS 66   ALT 68*   AST 32       All pertinent labs within the past 24 hours have been reviewed.    Significant Imaging:  I have reviewed all pertinent imaging results/findings within the past 24 hours.    ABG  Recent Labs   Lab 04/25/24  0036   PH 7.345*   PO2 84   PCO2 53.7*   HCO3 29.3*   BE 4*     Assessment/Plan:     Pulmonary  * Chronic respiratory failure with hypoxia  Patient with chronic hypoxic respiratory failure on home oxygen of 2L/NC.     CT chest imaging in September 2023 with marked emphysematous changes and some bronchial wall thickening, no evidence of mass / consolidation / nodules, trace bilateral effusions noted   Repeat CT chest April 2024 with low suspicion for underlying malignancy in light of no evidence of prior mass, lesion, nodule; no mediastinal lymphadenopathy  Supplemental oxygen to maintain sats 90% or greater  Sputum culture with Candida Albicans, on fluconazole   Legionella and fungitell negative   s/p course of Rocephin and Azith course  Repeat chest CT ordered for 4/29 unable to be completed as pt reports he could not lie flat s/t SOB, based on most recent CXR will ask IR to eval for thora - labs/cytology have been signed and held  Abd distention improved after aggressive bowel regimen instituted   Mobilize as able, OOB in chair, pt and family report poor activity tolerance at baseline and mostly sedentary lifestyle   Follows with [facility]ant at [facility], will need close pulmonary follow-up upon discharge with repeat imaging in 8-12 weeks, may ultimately need bronchoscopy and further work-up of lung mass    Bilateral pleural effusion  - see plan for respiratory failure    COPD exacerbation  - see plan for respiratory failure    Pleuritic chest pain  - see plan for respiratory failure    Pneumonia  - see plan for  respiratory failure    Right lower lobe lung mass  - see plan for respiratory failure     Cardiac/Vascular  Chronic diastolic congestive heart failure  Patient with chronic diastolic heart failure.     Last echo 9/27/2023: FINDINGS: The left ventricle is not well visualized. Normal left ventricular cavity size. Low normal left ventricular systolic function. LVEF 50 - 55%. Mild concentric hypertrophy. The right ventricle is not well visualized. Normal right ventricular size. Normal right ventricular systolic function. The left atrium is normal in size. Mild to moderately dilated right atrium. The mitral valve is not well visualized. No or trivial mitral valve regurgitation. No mitral valve stenosis. The aortic valve appears to have a tricuspid configuration (suboptimal   visualization). The aortic valve is not well visualized. No or trivial aortic valve regurgitation. No aortic valve stenosis. Mild tricuspid valve regurgitation. No tricuspid valve stenosis. The estimated right ventricular systolic pressure/PASP is 35-40 mmHg. The pulmonic valve is not well visualized. No or trivial pulmonic valve regurgitation. No pulmonic valve stenosis.     Monitor for diuretic needs  Monitor I&O trends  Monitor hemodynamics closely    Other  Debility  - after some discussion with pt and family it appears pt has been on a downward trajectory since a hospitalization in the Fall 2023  - no appetite  - consider palliative care consult / GOC discussions       Pulmonary team will continue to follow along. Please call with questions.        Samuel Case NP  Pulmonology  [facility]

## 2024-04-29 NOTE — PLAN OF CARE
04/29/24 1559   Post-Acute Status   Post-Acute Authorization Placement   Post-Acute Placement Status Pending payor review/awaiting authorization (if required)   Discharge Plan   Discharge Plan A Rehab     3:57pm SW spoke with Ema who stated pt has been approved. Pt and family want [facility]. NP aware, pending medical clearance.

## 2024-04-29 NOTE — OP NOTE
Pre Op Diagnosis: pleural effusion     Post Op Diagnosis: same    Procedure:  thoracentesis     Procedure performed by: Chaparro ANDUJAR, Goldy CUETO     Written Informed Consent Obtained: Yes     Specimen Removed:  yes     Estimated Blood Loss:  minimal     Findings: Local anesthesia and moderate sedation were used.     The patient tolerated the procedure well and there were no complications.        F/U with ordering physician    Sterile technique was performed in the lower abdomen, lidocaine was used as a local anesthetic.  800 cc of dark rebecca fluid sent to lab for evaluation.  Pt tolerated the procedure well without immediate complications.  Please see radiologist report for details. F/u with PCP and/or ordering physician.

## 2024-04-29 NOTE — ASSESSMENT & PLAN NOTE
Patient found to have moderate pleural effusion on imaging. I have personally reviewed and interpreted the following imaging: Xray and CT. A thoracentesis was deferred pending pulmonology consultation.  Most likely etiology includes  possible malignancy with new lung mass discovered on CT scan, and pneumonia      Repeat CT scan in am-pt could not tolerate CT due to SOB   Pulm recommended chest u/s

## 2024-04-29 NOTE — PROGRESS NOTES
Pharmacist Renal Dose Adjustment Note    Jason Mayfield III is a 83 y.o. male being treated with the medication fluconazole.     Patient Data:    Vital Signs (Most Recent):  Temp: 98.4 °F (36.9 °C) (04/29/24 1608)  Pulse: 84 (04/29/24 1634)  Resp: 16 (04/29/24 1634)  BP: (!) 144/66 (04/29/24 1608)  SpO2: (!) 94 % (04/29/24 1634) Vital Signs (72h Range):  Temp:  [97.6 °F (36.4 °C)-98.8 °F (37.1 °C)]   Pulse:  []   Resp:  [15-19]   BP: (134-197)/()   SpO2:  [92 %-99 %]      Recent Labs   Lab 04/26/24  0429 04/27/24  0542 04/29/24  0503   CREATININE 0.7 0.6 0.6     Serum creatinine: 0.6 mg/dL 04/29/24 0503  Estimated creatinine clearance: 95.9 mL/min    Medication: fluconazole 100 mg PO daily will be changed to fluconazole 200 mg PO daily per pharmacy renal dose adjustment protocol for patients with mild infections and CrCl greater than 50 mL/min.    Pharmacist's Name: Nataly Merlos PharmD  Pharmacist's Extension: [phone]     Thank you for allowing us to participate in this patient's care.     Nataly Merlos PharmD 04/29/2024 5:57 PM

## 2024-04-29 NOTE — ASSESSMENT & PLAN NOTE
- after some discussion with pt and family it appears pt has been on a downward trajectory since a hospitalization in the Fall 2023  - no appetite  - consider palliative care consult / GOC discussions

## 2024-04-29 NOTE — PLAN OF CARE
04/29/24 1103   Post-Acute Status   Post-Acute Authorization Placement   Post-Acute Placement Status Referrals Sent   Discharge Plan   Discharge Plan A Skilled Nursing Facility     KHOA spoke with pt and family regarding snf placement. Pt and family are in agreement with snf placement and referrlas being send to different facilities. SW sent snf referrals, and uploaded the passr/142 in careport. Pending accepting snf. Pt has has O2 Vapotherm.  2:18pm KHOA spoke with pt's np who stated pt and family would like to try for rehab. KHOA spoKe with Ema from [facility] who will assess pt. Pending f/u with Ema from [facility].

## 2024-04-29 NOTE — PT/OT/SLP PROGRESS
"Occupational Therapy   Treatment    Name: Jason Mayfield III  MRN: [id number]  Admitting Diagnosis:  Chronic respiratory failure with hypoxia       Recommendations:     Discharge Recommendations: Moderate Intensity Therapy  Discharge Equipment Recommendations:  to be determined by next level of care  Barriers to discharge:  None    Assessment:     Jason Mayfield III is a 83 y.o. male with a medical diagnosis of Chronic respiratory failure with hypoxia.  He presents with the following performance deficits affecting function are weakness, impaired endurance, impaired self care skills, impaired functional mobility, gait instability, impaired balance, decreased upper extremity function, decreased lower extremity function, decreased safety awareness, decreased ROM, impaired cardiopulmonary response to activity.     Rehab Prognosis:  Good; patient would benefit from acute skilled OT services to address these deficits and reach maximum level of function.       Plan:     Patient to be seen 2 x/week to address the above listed problems via self-care/home management, therapeutic activities, therapeutic exercises  Plan of Care Expires: 05/08/24  Plan of Care Reviewed with: patient    Subjective     Chief Complaint: "I can't breathe." SOB, cough  Patient/Family Comments/goals: improve strength and mobility  Pain/Comfort:  Pain Rating 1: 0/10    Objective:     Communicated with: Nurse and epic chart review prior to session.  Patient found HOB elevated with telemetry, peripheral IV, anderson catheter, oxygen upon OT entry to room.    General Precautions: Standard, fall, respiratory    Orthopedic Precautions:N/A  Braces: N/A  Respiratory Status: Nasal cannula, flow 4 L/min     Occupational Performance:     Bed Mobility:    Patient completed Rolling/Turning to Right with contact guard assistance  Patient completed Scooting/Bridging with contact guard assistance  Patient completed Supine to Sit with contact guard assistance   Required " extended time to complete. Extended seated rest break following bed mobility d/t SOB.    Functional Mobility/Transfers:  Patient completed Sit <> Stand Transfer with minimum assistance  with  rolling walker   Patient completed Bed <> Chair Transfer using Stand Pivot technique with minimum assistance with rolling walker  Required extended time to complete.    [facility] 6 Click ADL: 16    Treatment & Education:  Pt with increased SOB with exertion; educated pt on pursed lip breathing and importance of activity pacing. SpO2 91% throughout. Pt sitting EOB ~10 minutes, requiring CGA for sitting balance. Reviewed role of OT in acute setting and benefits of participation. Educated on techniques to use to increase independence and decrease fall risk with functional transfers. Educated on importance of OOB activity and calling for A to transfer back to bed. Encouraged completion of B UE AROM therex throughout the day to tolerance to increase functional strength and activity tolerance. Educated patient on importance of increased tolerance to upright position and direct impact on CV endurance and strength. Patient encouraged to sit up in chair for a minimum of 2 consecutive hours per day and for meals. Patient stated understanding and in agreement with POC.     Patient left up in chair with all lines intact, call button in reach, chair alarm on, and spouse present    GOALS:   Multidisciplinary Problems       Occupational Therapy Goals          Problem: Occupational Therapy    Goal Priority Disciplines Outcome Interventions   Occupational Therapy Goal     OT, PT/OT Progressing    Description: Goals to be met by: 5/8/24     Patient will increase functional independence with ADLs by performing:    UE Dressing with Modified [place].  Grooming while standing at sink with Modified [place].  Toileting from toilet with Modified [place] for hygiene and clothing management.   Toilet transfer to toilet with Contact Guard  Assistance.  Upper extremity exercise program x15 reps per handout, with independence.                         Time Tracking:     OT Date of Treatment: 04/29/24  OT Start Time: 0910  OT Stop Time: 0935  OT Total Time (min): 25 min    Billable Minutes:Therapeutic Activity 25    OT/DONYA: OT      Celia Meng OT     4/29/2024

## 2024-04-29 NOTE — SUBJECTIVE & OBJECTIVE
Interval History:  Patient seen and examined with family at bedside.  AAOX3, participating in therapy. SOB when laying flat- could not tolerate CT scan of chest. Pulm recommended U/S of chest. Rehab consult     Review of Systems   Constitutional:  Positive for activity change, appetite change and fatigue. Negative for chills, diaphoresis and fever.   HENT:  Negative for congestion, nosebleeds, sore throat and trouble swallowing.    Eyes:  Negative for pain, discharge and visual disturbance.   Respiratory:  Positive for shortness of breath. Negative for apnea, cough, chest tightness, wheezing and stridor.    Cardiovascular:  Negative for chest pain, palpitations and leg swelling.   Gastrointestinal:  Negative for abdominal distention, abdominal pain (Improving), blood in stool, constipation, diarrhea, nausea and vomiting.   Endocrine: Negative for cold intolerance and heat intolerance.   Genitourinary:  Positive for difficulty urinating. Negative for dysuria, flank pain, frequency and urgency.   Musculoskeletal:  Positive for arthralgias. Negative for back pain, joint swelling, myalgias, neck pain and neck stiffness.   Skin:  Negative for rash and wound.   Allergic/Immunologic: Negative for food allergies and immunocompromised state.   Neurological:  Positive for weakness. Negative for dizziness, seizures, syncope, facial asymmetry, light-headedness and headaches.   Hematological:  Negative for adenopathy.   Psychiatric/Behavioral:  Negative for agitation, behavioral problems and confusion. The patient is not nervous/anxious.    All other systems reviewed and are negative.    Objective:     Vital Signs (Most Recent):  Temp: 97.6 °F (36.4 °C) (04/29/24 1135)  Pulse: 73 (04/29/24 1349)  Resp: 18 (04/29/24 1259)  BP: (!) 141/65 (04/29/24 1135)  SpO2: (!) 94 % (04/29/24 1259) Vital Signs (24h Range):  Temp:  [97.6 °F (36.4 °C)-98.2 °F (36.8 °C)] 97.6 °F (36.4 °C)  Pulse:  [] 73  Resp:  [16-18] 18  SpO2:  [94 %-99  %] 94 %  BP: (134-197)/(65-92) 141/65     Weight: 82 kg (180 lb 12.8 oz)  Body mass index is 25.22 kg/m².    Intake/Output Summary (Last 24 hours) at 4/29/2024 1501  Last data filed at 4/29/2024 0424  Gross per 24 hour   Intake 360 ml   Output 1800 ml   Net -1440 ml         Physical Exam  Vitals reviewed.   Constitutional:       Appearance: He is ill-appearing.   HENT:      Head: Normocephalic and atraumatic.      Mouth/Throat:      Mouth: Mucous membranes are moist.      Pharynx: Oropharynx is clear.   Eyes:      Extraocular Movements: Extraocular movements intact.      Conjunctiva/sclera: Conjunctivae normal.   Cardiovascular:      Rate and Rhythm: Normal rate and regular rhythm.      Pulses: Normal pulses.      Heart sounds: Normal heart sounds.   Pulmonary:      Effort: No respiratory distress.      Breath sounds: Decreased breath sounds (left mid to lwoer lobes) present. No rhonchi.   Abdominal:      General: Bowel sounds are normal. There is distension (less so).      Palpations: Abdomen is soft.   Genitourinary:     Comments: Urinary retention requiring Queen catheter placement  Musculoskeletal:         General: Normal range of motion.      Cervical back: Normal range of motion and neck supple.   Skin:     General: Skin is warm and dry.   Neurological:      General: No focal deficit present.      Mental Status: He is alert and oriented to person, place, and time. Mental status is at baseline.             Significant Labs: All pertinent labs within the past 24 hours have been reviewed.  CBC:   Recent Labs   Lab 04/28/24  0404 04/29/24  0503   WBC 14.36* 14.17*   HGB 9.5* 9.8*   HCT 28.9* 30.7*    341     CMP:   Recent Labs   Lab 04/29/24  0503   *   K 4.4      CO2 29   GLU 94   BUN 13   CREATININE 0.6   CALCIUM 9.0   PROT 5.3*   ALBUMIN 2.4*   BILITOT 0.3   ALKPHOS 66   AST 32   ALT 68*   ANIONGAP 5*       Significant Imaging: I have reviewed all pertinent imaging results/findings within the  past 24 hours.

## 2024-04-29 NOTE — ASSESSMENT & PLAN NOTE
We will treat for pneumonia with Rocephin and Zithromax at this time  Blood cultures show NGTD  Sputum culture grew Yeast- cont Fluconazole   Cont IV Rocephin and Azithromycin

## 2024-04-29 NOTE — ASSESSMENT & PLAN NOTE
Patient with Hypoxic Respiratory failure which is Acute on chronic.  he is on home oxygen at 2 LPM. Supplemental oxygen was provided and noted-      .   Signs/symptoms of respiratory failure include- tachypnea, increased work of breathing, use of accessory muscles, and wheezing. Contributing diagnoses includes - COPD, Pleural effusion, Pneumonia, and lung mass  Labs and images were reviewed. Patient Has not had a recent ABG. Will treat underlying causes and adjust management of respiratory failure as follows-     Was given Solu-medrol x one dose per ED, with increased shortness breath and wheezing Solu-Medrol as we will do every 6 hours in addition to nebs.  Supplemental oxygen has been increased to Vapotherm at 10/50.      Continue supplemental oxygen.  We will treat with antibiotics, nebs, pulmonary hygiene

## 2024-04-30 LAB
ALBUMIN FLD-MCNC: 1.8 G/DL
ALBUMIN SERPL BCP-MCNC: 2.5 G/DL (ref 3.5–5.2)
ALP SERPL-CCNC: 66 U/L (ref 55–135)
ALT SERPL W/O P-5'-P-CCNC: 62 U/L (ref 10–44)
ANION GAP SERPL CALC-SCNC: 7 MMOL/L (ref 8–16)
ASPERGILLUS AB SER QL ID: NOT DETECTED
AST SERPL-CCNC: 29 U/L (ref 10–40)
B DERMAT AB SER QL ID: NOT DETECTED
BASOPHILS NFR BLD: 0 % (ref 0–1.9)
BILIRUB SERPL-MCNC: 0.4 MG/DL (ref 0.1–1)
BODY FLUID SOURCE, LDH: NORMAL
BUN SERPL-MCNC: 12 MG/DL (ref 8–23)
C IMMITIS AB SER QL ID: NOT DETECTED
CALCIUM SERPL-MCNC: 8.8 MG/DL (ref 8.7–10.5)
CHLORIDE SERPL-SCNC: 98 MMOL/L (ref 95–110)
CO2 SERPL-SCNC: 30 MMOL/L (ref 23–29)
CREAT SERPL-MCNC: 0.6 MG/DL (ref 0.5–1.4)
DIFFERENTIAL METHOD BLD: ABNORMAL
EOSINOPHIL NFR BLD: 1 % (ref 0–8)
ERYTHROCYTE [DISTWIDTH] IN BLOOD BY AUTOMATED COUNT: 13 % (ref 11.5–14.5)
EST. GFR  (NO RACE VARIABLE): >60 ML/MIN/1.73 M^2
GLUCOSE SERPL-MCNC: 95 MG/DL (ref 70–110)
H CAPSUL AB SER QL ID: NOT DETECTED
HCT VFR BLD AUTO: 30.9 % (ref 40–54)
HGB BLD-MCNC: 10.1 G/DL (ref 14–18)
IMM GRANULOCYTES # BLD AUTO: ABNORMAL K/UL (ref 0–0.04)
IMM GRANULOCYTES NFR BLD AUTO: ABNORMAL % (ref 0–0.5)
LDH FLD L TO P-CCNC: 693 U/L
LYMPHOCYTES NFR BLD: 26 % (ref 18–48)
MAGNESIUM SERPL-MCNC: 2 MG/DL (ref 1.6–2.6)
MCH RBC QN AUTO: 31.3 PG (ref 27–31)
MCHC RBC AUTO-ENTMCNC: 32.7 G/DL (ref 32–36)
MCV RBC AUTO: 96 FL (ref 82–98)
METAMYELOCYTES NFR BLD MANUAL: 2 %
MONOCYTES NFR BLD: 5 % (ref 4–15)
NEUTROPHILS NFR BLD: 65 % (ref 38–73)
NEUTS BAND NFR BLD MANUAL: 1 %
NRBC BLD-RTO: 0 /100 WBC
PLATELET # BLD AUTO: 313 K/UL (ref 150–450)
PLATELET BLD QL SMEAR: ABNORMAL
PMV BLD AUTO: 9.6 FL (ref 9.2–12.9)
POTASSIUM SERPL-SCNC: 4.1 MMOL/L (ref 3.5–5.1)
PROCALCITONIN SERPL IA-MCNC: 0.07 NG/ML
PROT FLD-MCNC: 3.1 G/DL
PROT SERPL-MCNC: 5.5 G/DL (ref 6–8.4)
RBC # BLD AUTO: 3.23 M/UL (ref 4.6–6.2)
SODIUM SERPL-SCNC: 135 MMOL/L (ref 136–145)
SPECIMEN SOURCE: NORMAL
SPECIMEN SOURCE: NORMAL
T4 FREE SERPL-MCNC: 1.09 NG/DL (ref 0.71–1.51)
TSH SERPL DL<=0.005 MIU/L-ACNC: 1.52 UIU/ML (ref 0.4–4)
WBC # BLD AUTO: 15.23 K/UL (ref 3.9–12.7)

## 2024-04-30 PROCEDURE — 25000003 PHARM REV CODE 250: Performed by: NURSE PRACTITIONER

## 2024-04-30 PROCEDURE — 94640 AIRWAY INHALATION TREATMENT: CPT

## 2024-04-30 PROCEDURE — 84145 PROCALCITONIN (PCT): CPT | Performed by: NURSE PRACTITIONER

## 2024-04-30 PROCEDURE — 92526 ORAL FUNCTION THERAPY: CPT

## 2024-04-30 PROCEDURE — 25000242 PHARM REV CODE 250 ALT 637 W/ HCPCS: Performed by: INTERNAL MEDICINE

## 2024-04-30 PROCEDURE — 84439 ASSAY OF FREE THYROXINE: CPT | Performed by: NURSE PRACTITIONER

## 2024-04-30 PROCEDURE — 97116 GAIT TRAINING THERAPY: CPT

## 2024-04-30 PROCEDURE — 25000242 PHARM REV CODE 250 ALT 637 W/ HCPCS: Performed by: FAMILY MEDICINE

## 2024-04-30 PROCEDURE — 25000003 PHARM REV CODE 250: Performed by: INTERNAL MEDICINE

## 2024-04-30 PROCEDURE — 63600175 PHARM REV CODE 636 W HCPCS: Performed by: INTERNAL MEDICINE

## 2024-04-30 PROCEDURE — 27000221 HC OXYGEN, UP TO 24 HOURS

## 2024-04-30 PROCEDURE — 80053 COMPREHEN METABOLIC PANEL: CPT | Performed by: NURSE PRACTITIONER

## 2024-04-30 PROCEDURE — 94664 DEMO&/EVAL PT USE INHALER: CPT

## 2024-04-30 PROCEDURE — 63600175 PHARM REV CODE 636 W HCPCS: Performed by: NURSE PRACTITIONER

## 2024-04-30 PROCEDURE — 36415 COLL VENOUS BLD VENIPUNCTURE: CPT | Performed by: NURSE PRACTITIONER

## 2024-04-30 PROCEDURE — 63700000 PHARM REV CODE 250 ALT 637 W/O HCPCS: Performed by: FAMILY MEDICINE

## 2024-04-30 PROCEDURE — 11000001 HC ACUTE MED/SURG PRIVATE ROOM

## 2024-04-30 PROCEDURE — 85027 COMPLETE CBC AUTOMATED: CPT | Performed by: NURSE PRACTITIONER

## 2024-04-30 PROCEDURE — 83735 ASSAY OF MAGNESIUM: CPT | Performed by: NURSE PRACTITIONER

## 2024-04-30 PROCEDURE — 97530 THERAPEUTIC ACTIVITIES: CPT

## 2024-04-30 PROCEDURE — 99900035 HC TECH TIME PER 15 MIN (STAT)

## 2024-04-30 PROCEDURE — 94761 N-INVAS EAR/PLS OXIMETRY MLT: CPT

## 2024-04-30 PROCEDURE — 27000646 HC AEROBIKA DEVICE

## 2024-04-30 PROCEDURE — 85007 BL SMEAR W/DIFF WBC COUNT: CPT | Performed by: NURSE PRACTITIONER

## 2024-04-30 PROCEDURE — 84443 ASSAY THYROID STIM HORMONE: CPT | Performed by: NURSE PRACTITIONER

## 2024-04-30 RX ORDER — AMOXICILLIN AND CLAVULANATE POTASSIUM 875; 125 MG/1; MG/1
1 TABLET, FILM COATED ORAL EVERY 12 HOURS
Status: DISCONTINUED | OUTPATIENT
Start: 2024-04-30 | End: 2024-05-02 | Stop reason: HOSPADM

## 2024-04-30 RX ORDER — SYRING-NEEDL,DISP,INSUL,0.3 ML 29 G X1/2"
296 SYRINGE, EMPTY DISPOSABLE MISCELLANEOUS ONCE
Status: COMPLETED | OUTPATIENT
Start: 2024-04-30 | End: 2024-04-30

## 2024-04-30 RX ORDER — HYDROCODONE BITARTRATE AND ACETAMINOPHEN 7.5; 325 MG/1; MG/1
1 TABLET ORAL ONCE
Status: COMPLETED | OUTPATIENT
Start: 2024-04-30 | End: 2024-04-30

## 2024-04-30 RX ORDER — SYRING-NEEDL,DISP,INSUL,0.3 ML 29 G X1/2"
296 SYRINGE, EMPTY DISPOSABLE MISCELLANEOUS ONCE
Status: DISCONTINUED | OUTPATIENT
Start: 2024-04-30 | End: 2024-04-30

## 2024-04-30 RX ADMIN — ALBUTEROL SULFATE 2.5 MG: 2.5 SOLUTION RESPIRATORY (INHALATION) at 07:04

## 2024-04-30 RX ADMIN — FLUCONAZOLE 200 MG: 100 TABLET ORAL at 09:04

## 2024-04-30 RX ADMIN — MELATONIN TAB 3 MG 6 MG: 3 TAB at 09:04

## 2024-04-30 RX ADMIN — METOPROLOL TARTRATE 25 MG: 25 TABLET, FILM COATED ORAL at 09:04

## 2024-04-30 RX ADMIN — AMOXICILLIN AND CLAVULANATE POTASSIUM 1 TABLET: 875; 125 TABLET, FILM COATED ORAL at 09:04

## 2024-04-30 RX ADMIN — HYDRALAZINE HYDROCHLORIDE 50 MG: 25 TABLET, FILM COATED ORAL at 02:04

## 2024-04-30 RX ADMIN — POLYETHYLENE GLYCOL 3350 17 G: 17 POWDER, FOR SOLUTION ORAL at 02:04

## 2024-04-30 RX ADMIN — MAGNESIUM CITRATE 296 ML: 1.75 LIQUID ORAL at 02:04

## 2024-04-30 RX ADMIN — GABAPENTIN 1200 MG: 400 CAPSULE ORAL at 09:04

## 2024-04-30 RX ADMIN — SENNOSIDES AND DOCUSATE SODIUM 2 TABLET: 50; 8.6 TABLET ORAL at 09:04

## 2024-04-30 RX ADMIN — POLYETHYLENE GLYCOL 3350 17 G: 17 POWDER, FOR SOLUTION ORAL at 09:04

## 2024-04-30 RX ADMIN — ALBUTEROL SULFATE 2.5 MG: 2.5 SOLUTION RESPIRATORY (INHALATION) at 12:04

## 2024-04-30 RX ADMIN — BUDESONIDE INHALATION 0.5 MG: 0.5 SUSPENSION RESPIRATORY (INHALATION) at 07:04

## 2024-04-30 RX ADMIN — PREDNISONE 20 MG: 20 TABLET ORAL at 09:04

## 2024-04-30 RX ADMIN — HYDROCODONE BITARTRATE AND ACETAMINOPHEN 1 TABLET: 7.5; 325 TABLET ORAL at 11:04

## 2024-04-30 RX ADMIN — HYDROCHLOROTHIAZIDE 12.5 MG: 12.5 TABLET ORAL at 09:04

## 2024-04-30 RX ADMIN — TAMSULOSIN HYDROCHLORIDE 0.4 MG: 0.4 CAPSULE ORAL at 09:04

## 2024-04-30 RX ADMIN — GUAIFENESIN 600 MG: 600 TABLET, EXTENDED RELEASE ORAL at 09:04

## 2024-04-30 RX ADMIN — BISACODYL 10 MG: 10 SUPPOSITORY RECTAL at 09:04

## 2024-04-30 RX ADMIN — ENOXAPARIN SODIUM 40 MG: 40 INJECTION SUBCUTANEOUS at 04:04

## 2024-04-30 RX ADMIN — FINASTERIDE 5 MG: 5 TABLET, FILM COATED ORAL at 09:04

## 2024-04-30 RX ADMIN — HYDRALAZINE HYDROCHLORIDE 50 MG: 25 TABLET, FILM COATED ORAL at 09:04

## 2024-04-30 RX ADMIN — GABAPENTIN 1200 MG: 400 CAPSULE ORAL at 02:04

## 2024-04-30 RX ADMIN — MELATONIN TAB 3 MG 6 MG: 3 TAB at 12:04

## 2024-04-30 RX ADMIN — HYDRALAZINE HYDROCHLORIDE 50 MG: 25 TABLET, FILM COATED ORAL at 06:04

## 2024-04-30 NOTE — PT/OT/SLP PROGRESS
Speech Language Pathology Treatment    Patient Name:  Jason Mayfield III   MRN:  [id number]  Admitting Diagnosis: Acute on chronic respiratory failure with hypoxia    Recommendations:                 General Recommendations:   Diet Follow-Up  Diet recommendations:  Soft & Bite Sized Diet - IDDSI Level 6, Liquid Diet Level: Thin liquids - IDDSI Level 0   Aspiration Precautions: 1 bite/sip at a time, Avoid talking while eating, Feed only when awake/alert, Frequent oral care, HOB to 90 degrees, Small bites/sips, and Standard aspiration precautions   General Precautions: Standard, aspiration  Communication strategies:  none    Subjective     Pt seen bedside for tx with family present throughout.   Patient goals: to be discharged     Pain/Comfort:  Pain Rating 1: 0/10  Pain Rating Post-Intervention 1: 0/10  Pain Rating Post-Intervention 2: 0/10    Respiratory Status: Nasal cannula, flow 3 L/min    Objective:     Has the patient been evaluated by SLP for swallowing?   Yes  Keep patient NPO? No   Current Respiratory Status:        Clinical Swallow Examination:   Of note, patient self-fed throughout evaluation. Patient presented with:     CONSISTENCY  NOTES   THIN (IDDSI 0) Cup/straw sips    No overt s/s of aspiration appreciated. One instance of throat clear, of note, pt with throat clear in the absence of PO intake as well.      PUREE (IDDSI 4/Extremely Thick)   TSP/TBSP bites of applesauce No overt s/s of aspiration appreciated   SOLID (IDDSI 7/Regular) Bite of Kristin Doone cookie x 4   Pt with one instance of cough after intake of sequential large bites with trace oral residue. When ST cued pt to take one bite at a time pt with no overt s/s of aspiration for subsequent trials. Liquid wash cleared oral residue.        Thickened liquids were not used in this assessment. Anel (2018) reported that thickened liquids have no sound evidence at reducing the risk of pneumonia in patients with dysphagia and can cause harm by  increasing their risk of dehydration. It also presents an increased risk of UTI, electrolyte imbalance, constipation, fecal impaction, cognitive impairment, functional decline and even death ([place], 2002; [place], 2016).  Thickened liquids are associated with risks including dehydration, increased pharyngeal residue, potential interference with medication absorption, and decreased quality of life (Zeinab, 2013). Thickened liquids are also more likely to be silently aspirated than thin liquids (Anthony et al., 2018). This supports the assertion that we should confirm a patient requires thickened liquids with an instrumental swallow study prior to recommending them.    References:   Zeinab BATRES (2013). Thickening agents used for dysphagia management: Effect on bioavailability of water, medication and feelings of satiety. Nutrition Journal, 12, 54. https://doi.org/10.1186/9086-4449-40-54    GISEL Cruz, REYNALDO Mendoza, SANGEETA Martinez, & GISEL Valdes (2018). Cough response to aspiration in thin and thick fluids during FEES in hospitalized inpatients. International journal of language & communication disorders, 53(5), 909-918. https://doi.org/10.1111/7614-5120.67044    INTERPRETATION AND RISK ASSESSMENT:  Clinical swallow evaluation (CSE) revealed oral phase characterized by lingual, labial, and buccal strength and range of motion adequate for lip closure, bolus preparation and propulsion. The patient had no anterior loss of the bolus with complete closure of the lips around the utensils. Trace residue remained in the oral cavity following the swallow of solid (IDDSI 7) that was cleared with a liquid wash. Patient without overt clinical signs/symptoms of aspiration on any PO trials given. Contributing risk factors for dysphagia include deficits in respiratory-swallow coordination. Patient with increased risk for silent aspiration given potential sensory deficits related to COPD. Swallowing prognosis is Good. Instrumental  swallow study is not indicated at this time to assess the swallowing physiology and rule out aspiration of various consistencies.      Assessment:     Jason Mayfield III is a 83 y.o. male with a PMHx significant for hypertension, NSTEMI, diastolic congestive heart failure, COPD, chronic hypoxic respiratory failure on home O2 @ 2L/min NC, BPH, fibroadenoma of breast, hypothyroidism, infrarenal AAA(3.4 cm), chronic back pain, neuropathy, shingles infection, kidney stones, colonic polyps, daily alcohol use, and former tobacco abuse who presented to ED with complaints of chest pain and dyspnea. Pt reports that he lives with his wife and son at this time who are present throughout tx. Pt reports that he is feeling overall better today. Based on findings during repeated clinical swallow evaluation, he is recommended for a diet advancement to soft and bite sized solids (IDDSI 6) and thin liquids (IDDSI 0) with the implementation of small bites/sips and one bite/sip at a time in the setting of deficits in respiratory-swallow coordination. Pt verbalized understanding and willingness to implement strategies. SLP to follow-up and assess tolerance of diet advancement.    Goals:   Multidisciplinary Problems       SLP Goals          Problem: SLP    Goal Priority Disciplines Outcome   SLP Goal     SLP Progressing   Description: TPW tolerate the least restrictive diet without any overt s/s of aspiration  TPW recall and/or follow aspiration precautions to decrease risk of aspiration                       Plan:     Patient to be seen:  2 x/week, 3 x/week   Plan of Care expires:  05/07/24  Plan of Care reviewed with:  patient, family   SLP Follow-Up:  Yes       Discharge recommendations:   (pending pt acute progress)   Barriers to Discharge:  None    Time Tracking:     SLP Treatment Date:   04/30/24  Speech Start Time:  1020  Speech Stop Time:  1050     Speech Total Time (min):  30 min    Billable Minutes: Treatment Swallowing  Dysfunction 30    Justine Sarmiento MA, Provisional SLP, CF-SLP  Speech Language Pathologist  04/30/2024

## 2024-04-30 NOTE — PT/OT/SLP PROGRESS
Physical Therapy Treatment    Patient Name:  Jason Mayfield III   MRN:  [id number]    Recommendations:     Discharge Recommendations: High Intensity Therapy  Discharge Equipment Recommendations: to be determined by next level of care  Barriers to discharge: None    Assessment:     Jason Mayfield III is a 83 y.o. male admitted with a medical diagnosis of Acute on chronic respiratory failure with hypoxia.  He presents with the following impairments/functional limitations: weakness, impaired endurance, impaired functional mobility, gait instability, impaired balance, pain, decreased lower extremity function, decreased safety awareness, impaired cardiopulmonary response to activity.    Patient presents with good participation and motivation to return to prior level of function with high intensity therapy. The patient demonstrates appropriate endurance, strength, and pain control required to participate in up to 3 hours of combined therapy per day.     Rehab Prognosis: Good; patient would benefit from acute skilled PT services to address these deficits and reach maximum level of function.    Recent Surgery: * No surgery found *      Plan:     During this hospitalization, patient to be seen 3 x/week to address the identified rehab impairments via gait training, therapeutic activities, therapeutic exercises and progress toward the following goals:    Plan of Care Expires:  05/08/24    Subjective     Chief Complaint: Pt is motivated to participate, c/o SOB  Patient/Family Comments/goals: none stated  Pain/Comfort:  Pain Rating 1: 0/10 (at rest)  Location - Side 1: Left  Location - Orientation 1: generalized  Location 1: flank  Pain Addressed 1: Distraction  Pain Rating Post-Intervention 1:  (while ambulating pt reported pain in L flank especially with deep breaths)      Objective:     Communicated with nurse Oden and epic chart review prior to session.  Patient found HOB elevated with peripheral IV, telemetry, oxygen, anderson  "catheter upon PT entry to room.     General Precautions: Standard, fall, respiratory  Orthopedic Precautions: N/A  Braces: N/A  Respiratory Status: Nasal cannula, flow 3 L/min     Functional Mobility:  Gait belt applied - Yes  Bed Mobility  Rolling Right: stand by assistance  Scooting: stand by assistance  Supine to Sit: stand by assistance  Transfers  Sit to Stand: contact guard assistance with rolling walker  Bed to Chair: minimum assistance with rolling walker using Step Transfer  Gait  Patient ambulated 30ft with rolling walker and minimum assistance. Patient demonstrates occasional unsteady gait. No c/o dizziness, c/o SOB with exertion, standing rest needed, SpO2 93% after ambulating 8ft, 89-90% after ambulation, educated about pursed lip breathing technique and cued for use with mobility, recovered to WNL. All lines remained intact throughout ambulation trail and portable Supplemental O2 3L utilized.  Balance  Sitting: contact guard assistance  Standing: minimum assistance    AM-PAC 6 CLICK MOBILITY  Turning over in bed (including adjusting bedclothes, sheets and blankets)?: 3  Sitting down on and standing up from a chair with arms (e.g., wheelchair, bedside commode, etc.): 3  Moving from lying on back to sitting on the side of the bed?: 3  Moving to and from a bed to a chair (including a wheelchair)?: 3  Need to walk in hospital room?: 3  Climbing 3-5 steps with a railing?: 1 (NT)  Basic Mobility Total Score: 16       Treatment & Education:  Reviewed role of PT in acute care and POC. Pt tolerated interventions well. Reviewed importance of OOB activities, activity pacing, and HEP (marching/hip flex, hip abd, heel slides/LAQ, quad sets, ankle pumps) in order to maintain/regain strength. Encouraged to sit up in chair for all meals. Reviewed proper use of RW for safety and to reduce risk of falling. Reviewed "call don't fall" policy and increased risk of falling due to weakness, instructed to utilize call bell " for assistance with all transfers. Pt agreeable to all requests.      Patient left up in chair with all lines intact, call button in reach, chair alarm on, nurse notified, and spouse present..    GOALS:   Multidisciplinary Problems       Physical Therapy Goals          Problem: Physical Therapy    Goal Priority Disciplines Outcome Goal Variances Interventions   Physical Therapy Goal     PT, PT/OT Progressing     Description: Goals to be met by 5/8/24.  1. Pt will complete bed mobility MOD I.  2. Pt will complete sit to stand MOD I.  3. Pt will ambulate 200ft MOD I using RW.  4. Pt will increase AMPAC score by 2 points to progress functional mobility.                       Time Tracking:     PT Received On: 04/30/24  PT Start Time: 1325     PT Stop Time: 1350  PT Total Time (min): 25 min     Billable Minutes: Gait Training 15min and Therapeutic Activity 10min    Treatment Type: Treatment  PT/PTA: PT     Number of PTA visits since last PT visit: 0     04/30/2024

## 2024-04-30 NOTE — PT/OT/SLP PROGRESS
Occupational Therapy   Treatment    Name: Jason Mayfield III  MRN: [id number]  Admitting Diagnosis:  Acute on chronic respiratory failure with hypoxia       Recommendations:     Discharge Recommendations: High Intensity Therapy  Discharge Equipment Recommendations:  to be determined by next level of care  Barriers to discharge:  None    Assessment:     Jason Mayfield III is a 83 y.o. male with a medical diagnosis of Acute on chronic respiratory failure with hypoxia.  Performance deficits affecting function are weakness, gait instability, decreased upper extremity function, impaired cardiopulmonary response to activity, impaired balance, impaired endurance, decreased lower extremity function, decreased safety awareness, impaired self care skills, impaired functional mobility.     Rehab Prognosis:  Good; patient would benefit from acute skilled OT services to address these deficits and reach maximum level of function.       Plan:     Patient to be seen 2 x/week to address the above listed problems via self-care/home management, therapeutic activities, therapeutic exercises  Plan of Care Expires: 05/08/24  Plan of Care Reviewed with: patient, spouse    Subjective     Chief Complaint: Weakness  Patient/Family Comments/goals: increase independence  Pain/Comfort:  Pain Rating 1: 0/10  Pain Rating Post-Intervention 1: 0/10    Objective:     Communicated with: Nurse prior to session.  Patient found HOB elevated with telemetry, peripheral IV, oxygen, anderson catheter upon OT entry to room.    General Precautions: Standard, respiratory, fall    Orthopedic Precautions:N/A  Braces: N/A  Respiratory Status: Nasal cannula, flow 3 L/min     Occupational Performance:     Bed Mobility:    Patient completed Rolling/Turning to Right with stand by assistance  Patient completed Scooting/Bridging with stand by assistance  Patient completed Supine to Sit with stand by assistance     Functional Mobility/Transfers:  Patient completed Sit <> Stand  Transfer with contact guard assistance  with  rolling walker   Patient completed Bed <> Chair Transfer using Stand Pivot technique with minimum assistance with rolling walker  Functional Mobility: Pt ambulated x30 feet with RW min A, with one standing rest break due to reports of L side cramp    [facility] ADL: 16    Treatment & Education:  Pt with positive gains today with therapy, increasing gait distance. O2 following gait 89%. Recovered to 92 within a minute. Pt educated on deep breathing techniques to increase lung capacity. Pt also encouraged to increase time spent OOB to increase CV endurance and activity tolerance. Pt educated on AROM exercises to perform throughout the day for BUE functional strength. Pt verbalized and demonstrated understanding.     Patient left up in chair with all lines intact, call button in reach, chair alarm on, nurse notified, and spouse present    GOALS:   Multidisciplinary Problems       Occupational Therapy Goals          Problem: Occupational Therapy    Goal Priority Disciplines Outcome Interventions   Occupational Therapy Goal     OT, PT/OT Progressing    Description: Goals to be met by: 5/8/24     Patient will increase functional independence with ADLs by performing:    UE Dressing with Modified [place].  Grooming while standing at sink with Modified [place].  Toileting from toilet with Modified [place] for hygiene and clothing management.   Toilet transfer to toilet with Contact Guard Assistance.  Upper extremity exercise program x15 reps per handout, with independence.                         Time Tracking:     OT Date of Treatment: 04/30/24  OT Start Time: 1330  OT Stop Time: 1355  OT Total Time (min): 25 min    Billable Minutes:Therapeutic Activity 25    IRMA Steele  OT/DONYA: OT          4/30/2024

## 2024-04-30 NOTE — PLAN OF CARE
Pt tolerated interventions well. Required SBA for bed mobility, ambulated 30ft MIN A with RW. Recommending high intensity therapy upon d/c.

## 2024-04-30 NOTE — PLAN OF CARE
Pt bed mobility SBA. Sit to stand CGA with RW. Ambulated with RW x30 feet min A with on standing rest break.   Rec high intensity therapy at [facility]

## 2024-04-30 NOTE — PLAN OF CARE
04/30/24 1543   Rounds   Attendance Provider;;Charge nurse;Physical therapist   Discharge Plan A Rehab   Why the patient remains in the hospital Requires continued medical care   Transition of Care Barriers None       Patient having repeat CT scan done today and still requiring oxygen at this time.  Patient accepted by [facility] upon DC.

## 2024-04-30 NOTE — SUBJECTIVE & OBJECTIVE
Interval History:  Patient seen and examined with family at bedside.  AAOX3, participating in therapy. Unable to lay flat for CT chest. Pulmonology recommended IR thoracentesis which was exudative. Pathology and cultures pending. SOB improved. Will try again today for CT chest. Po Norco x one dose added due to back pain with lying flat.   CM consulted for rehab placement -[facility] re[facility] accepted when medically stable.   No BM x 3 days- increase Miralax to TID and Mag citrate.     Review of Systems   Constitutional:  Positive for activity change, appetite change and fatigue. Negative for chills, diaphoresis and fever.   HENT:  Negative for congestion, nosebleeds, sore throat and trouble swallowing.    Eyes:  Negative for pain, discharge and visual disturbance.   Respiratory:  Positive for shortness of breath (improved). Negative for apnea, cough, chest tightness, wheezing and stridor.    Cardiovascular:  Negative for chest pain, palpitations and leg swelling.   Gastrointestinal:  Positive for constipation. Negative for abdominal distention, abdominal pain (Improving), blood in stool, diarrhea, nausea and vomiting.   Endocrine: Negative for cold intolerance and heat intolerance.   Genitourinary:  Positive for difficulty urinating. Negative for dysuria, flank pain, frequency and urgency.   Musculoskeletal:  Positive for arthralgias, back pain and neck pain. Negative for joint swelling, myalgias and neck stiffness.   Skin:  Negative for rash and wound.   Allergic/Immunologic: Negative for food allergies and immunocompromised state.   Neurological:  Positive for weakness. Negative for dizziness, seizures, syncope, facial asymmetry, light-headedness and headaches.   Hematological:  Negative for adenopathy.   Psychiatric/Behavioral:  Negative for agitation, behavioral problems and confusion. The patient is not nervous/anxious.    All other systems reviewed and are negative.    Objective:     Vital Signs (Most Recent):  Temp:  97.6 °F (36.4 °C) (04/30/24 1214)  Pulse: 66 (04/30/24 1214)  Resp: 18 (04/30/24 1214)  BP: 126/60 (04/30/24 1214)  SpO2: 96 % (04/30/24 1214) Vital Signs (24h Range):  Temp:  [97.6 °F (36.4 °C)-98.4 °F (36.9 °C)] 97.6 °F (36.4 °C)  Pulse:  [] 66  Resp:  [15-20] 18  SpO2:  [94 %-99 %] 96 %  BP: (126-185)/(60-85) 126/60     Weight: 82 kg (180 lb 12.8 oz)  Body mass index is 25.22 kg/m².    Intake/Output Summary (Last 24 hours) at 4/30/2024 1244  Last data filed at 4/30/2024 0500  Gross per 24 hour   Intake --   Output 1300 ml   Net -1300 ml         Physical Exam  Vitals reviewed.   Constitutional:       Appearance: He is ill-appearing.   HENT:      Head: Normocephalic and atraumatic.      Mouth/Throat:      Mouth: Mucous membranes are moist.      Pharynx: Oropharynx is clear.   Eyes:      Extraocular Movements: Extraocular movements intact.      Conjunctiva/sclera: Conjunctivae normal.   Cardiovascular:      Rate and Rhythm: Normal rate and regular rhythm.      Pulses: Normal pulses.      Heart sounds: Normal heart sounds.   Pulmonary:      Effort: No respiratory distress.      Breath sounds: Decreased breath sounds present. No rhonchi.   Abdominal:      General: Bowel sounds are normal. There is distension (less so).      Palpations: Abdomen is soft.   Genitourinary:     Comments: Urinary retention requiring Queen catheter placement  Musculoskeletal:         General: Normal range of motion.      Cervical back: Normal range of motion and neck supple.   Skin:     General: Skin is warm and dry.   Neurological:      General: No focal deficit present.      Mental Status: He is alert and oriented to person, place, and time. Mental status is at baseline.             Significant Labs: All pertinent labs within the past 24 hours have been reviewed.  CBC:   Recent Labs   Lab 04/29/24  0503 04/30/24  0658   WBC 14.17* 15.23*   HGB 9.8* 10.1*   HCT 30.7* 30.9*    313     CMP:   Recent Labs   Lab 04/29/24  6054  04/30/24  0658   * 135*   K 4.4 4.1    98   CO2 29 30*   GLU 94 95   BUN 13 12   CREATININE 0.6 0.6   CALCIUM 9.0 8.8   PROT 5.3* 5.5*   ALBUMIN 2.4* 2.5*   BILITOT 0.3 0.4   ALKPHOS 66 66   AST 32 29   ALT 68* 62*   ANIONGAP 5* 7*       Significant Imaging: I have reviewed all pertinent imaging results/findings within the past 24 hours.

## 2024-04-30 NOTE — ASSESSMENT & PLAN NOTE
Last echo 9/27/2023: FINDINGS: The left ventricle is not well visualized. Normal left ventricular cavity size. Low normal left ventricular systolic function. LVEF 50 - 55%. Mild concentric hypertrophy. The right ventricle is not well visualized. Normal right ventricular size. Normal right ventricular systolic function. The left atrium is normal in size. Mild to moderately dilated right atrium. The mitral valve is not well visualized. No or trivial mitral valve regurgitation. No mitral valve stenosis. The aortic valve appears to have a tricuspid configuration (suboptimal   visualization). The aortic valve is not well visualized. No or trivial aortic valve regurgitation. No aortic valve stenosis. Mild tricuspid valve regurgitation. No tricuspid valve stenosis. The estimated right ventricular systolic pressure/PASP is 35-40 mmHg. The pulmonic valve is not well visualized. No or trivial pulmonic valve regurgitation. No pulmonic valve stenosis.   Monitor for diuretic needs  Monitor I&O trends  Monitor hemodynamics closely

## 2024-04-30 NOTE — ASSESSMENT & PLAN NOTE
Patient with chronic hypoxic respiratory failure on home oxygen of 2L/NC.     CT chest imaging in September 2023 with marked emphysematous changes and some bronchial wall thickening, no evidence of mass / consolidation / nodules, trace bilateral effusions noted   CT chest April 22, 2024 with low suspicion for underlying malignancy in light of no evidence of prior mass, lesion, nodule; no mediastinal lymphadenopathy; R area of dense consolidation noted  CT chest April 30, 2024 with moderate to large left pleural effusion with associated compressive atelectasis of the left lower lobe and decreased lung consolidation in the right lower lobe   Supplemental oxygen to maintain sats 90% or greater  Sputum culture with Candida Albicans, on fluconazole for oral thrush   Legionella and fungitell negative   s/p course of Rocephin and Azith course, start augmentin for 2 weeks given ongoing leukocytosis and concern for possible RLL infection  s/p left thora 4/28 that shows fluid to be exudate, culture and cytology pending  Will plan for repeat thora 4/30 to drain remaining fluid   Mobilize as able, OOB in chair, pt and family report poor activity tolerance at baseline and mostly sedentary lifestyle; rehab placement pending   Follows with Dr. Gant at [facility], will need close pulmonary follow-up upon discharge with repeat imaging in 8-12 weeks, may ultimately need bronchoscopy and further work-up of possible lung mass

## 2024-04-30 NOTE — PROGRESS NOTES
[facility] - Med S[facility] 3  Pulmonology  Progress Note    Patient Name: Jason Mayfield III  MRN: [id number]  Admission Date: 4/22/2024  Hospital Length of Stay: 8 days  Code Status: Full Code  Attending Provider: Jacques Hayden MD  Primary Care Provider: TANNER Mane MD   Principal Problem: Acute on chronic respiratory failure with hypoxia    Subjective:     Jason Mayfield is a 83-year-old male with a past medical history significant for hypertension, NSTEMI, chronic diastolic heart failure, COPD / emphysema, chronic hypoxic respiratory failure on home oxygen of 2L/NC, BPH, hypothyroidism, AAA, chronic back pain, neuropathy, daily alcohol use, and former tobacco abuse who presented for evaluation of left lower chest pain and shortness of breath. Patient and family endorses a dry non-productive cough which had been ongoing for approximately 5days. Denies fever / chills or diarrhea at home. Endorses constipation and reports no bowel movement for almost two weeks. On exam, some mild LUQ tenderness with palpation; however, no guarding noted. Abdomen firm to touch with faint minimal bowel sounds. Due to his severe pain, the patient reports difficulty with taking deep breaths. Pain exacerbated by cough / deep inspiration. CTA chest completed at time of presentation demonstrates extensive emphysematous changes, bilateral pleural effusions, and RLL consolidation / mass. Patient was admitted by hospital medicine and pulmonary consulted for evaluation.     Of note, patient is followed by Dr. Gant with [facility] pulmonology. 60-pack year smoking history. Previous CT imaging in September 2023 with marked emphysematous changes and some bronchial wall thickening. No evidence of mass / consolidation / nodules on imaging. Trace bilateral effusions noted. Patient is a resident in [place], [place] with reports of 3 cats and 2 dogs in their home.     Patient recently seen by Dr. Gant in March 2023 who felt his shortness of breath was  "multifactorial. Patient is noted to have moderate impairment on his pulmonary testing and felt his conditions were exacerbated by his underlying diastolic heart failure. In addition, it was felt a significant portion of his SOB was likely contributed to physical deconditioning as he demonstrates activity intolerance with an inability to walk greater than 100ft without worsening dyspnea.    Interval history, f/u chief complaint shortness of breath:  4/24: patient attempted repositioning himself in bed with notable increased work of breathing with associated wheezing noted on exam. Patient transitioned to Vapotherm 25/0.50 this morning and given IV Solu-Medrol with nebulizer treatment. Patient pulmonary status improved. Significant abdominal distention which is likely contributing to his underlying shortness of breath. Indicates some stool production yesterday; however, sounds minimal in nature in comparison to his abdominal imaging.   4/25: sedated at time of my exam; patient with improved air movement throughout on exam this morning; currently on Vapotherm 25/0.50 this morning. Patient became extremely anxious, tachypneic, and mildly tachycardic overnight. RT attempted to give scheduled neb tx, but patient had an episode of vomiting. Case and plan of care discussed with son at bedside this morning.   4/26: Patient reports he feels "terrible" this morning; complaints are vague in nature. Endorses generalized weakness. Pulmonary status significantly improved. On Vapotherm 15/0.35. Abdomen significantly softer from previous exam. By reports, several large size bowel movements yesterday afternoon.   4/28: Pulmonary status stable; increased dyspnea this am following his breathing treatment and oral meds. Minimal mobilization as reported by family at bedside. Patient and family encouraged to increase mobility. Weaning supplemental oxygen; currently on nasal cannula at 8L/NC. Denies productive cough  4/29: pt reports he " was unable to get his CT this AM bc he could not lie flat s/t SOB, remains on 4L NC, appears overall quite weak and debilitated - after discussion with pt and family it appears pt has had a downward trajectory since a hospitalization in last 2023, no appetite   4/30: s/p L thora yesterday w/800cc off, CT today still with decent size effusion- will plan for additional thora in the AM, start Augmentin for 14 days    ROS complete and negative unless stated in the interval HPI     Objective:     Vital Signs (Most Recent):  Temp: 97.6 °F (36.4 °C) (04/30/24 1214)  Pulse: 70 (04/30/24 1500)  Resp: 18 (04/30/24 1214)  BP: 126/60 (04/30/24 1214)  SpO2: 96 % (04/30/24 1214) Vital Signs (24h Range):  Temp:  [97.6 °F (36.4 °C)-98.2 °F (36.8 °C)] 97.6 °F (36.4 °C)  Pulse:  [] 70  Resp:  [15-20] 18  SpO2:  [95 %-98 %] 96 %  BP: (126-185)/(60-85) 126/60     Weight: 82 kg (180 lb 12.8 oz)  Body mass index is 25.22 kg/m².      Intake/Output Summary (Last 24 hours) at 4/30/2024 1725  Last data filed at 4/30/2024 1615  Gross per 24 hour   Intake --   Output 2550 ml   Net -2550 ml        Physical Exam  Vitals reviewed.   Constitutional:       General: He is awake. He is not in acute distress.     Appearance: He is well-developed. He is ill-appearing.      Comments: Chronically ill appearing male sitting in bed on NC in NAD eating dinner     Pulmonary:      Effort: Pulmonary effort is normal.      Breath sounds: Decreased breath sounds present.   Abdominal:      General: There is no distension.      Palpations: Abdomen is soft.   Musculoskeletal:      Comments: HOLLAND   Skin:     General: Skin is warm and dry.   Neurological:      General: No focal deficit present.      Mental Status: He is alert.   Psychiatric:         Behavior: Behavior is cooperative.             Vents:  Oxygen Concentration (%): 40 (04/28/24 0017)    Lines/Drains/Airways       Peripheral Intravenous Line  Duration                  Peripheral IV - Single Lumen  04/28/24 0600 22 G Anterior;Right Forearm 2 days                    Significant Labs:    CBC/Anemia Profile:  Recent Labs   Lab 04/29/24  0503 04/30/24  0658   WBC 14.17* 15.23*   HGB 9.8* 10.1*   HCT 30.7* 30.9*    313   MCV 97 96   RDW 13.1 13.0        Chemistries:  Recent Labs   Lab 04/29/24  0503 04/30/24  0658   * 135*   K 4.4 4.1    98   CO2 29 30*   BUN 13 12   CREATININE 0.6 0.6   CALCIUM 9.0 8.8   ALBUMIN 2.4* 2.5*   PROT 5.3* 5.5*   BILITOT 0.3 0.4   ALKPHOS 66 66   ALT 68* 62*   AST 32 29   MG  --  2.0       All pertinent labs within the past 24 hours have been reviewed.    Significant Imaging:  I have reviewed all pertinent imaging results/findings within the past 24 hours.    ABG  Recent Labs   Lab 04/25/24  0036   PH 7.345*   PO2 84   PCO2 53.7*   HCO3 29.3*   BE 4*     Assessment/Plan:     Pulmonary  * Acute on chronic respiratory failure with hypoxia  Patient with chronic hypoxic respiratory failure on home oxygen of 2L/NC.     CT chest imaging in September 2023 with marked emphysematous changes and some bronchial wall thickening, no evidence of mass / consolidation / nodules, trace bilateral effusions noted   CT chest April 22, 2024 with low suspicion for underlying malignancy in light of no evidence of prior mass, lesion, nodule; no mediastinal lymphadenopathy; R area of dense consolidation noted  CT chest April 30, 2024 with moderate to large left pleural effusion with associated compressive atelectasis of the left lower lobe and decreased lung consolidation in the right lower lobe   Supplemental oxygen to maintain sats 90% or greater  Sputum culture with Candida Albicans, on fluconazole for oral thrush   Legionella and fungitell negative   s/p course of Rocephin and Azith course, start augmentin for 2 weeks given ongoing leukocytosis and concern for possible RLL infection  s/p left thora 4/28 that shows fluid to be exudate, culture and cytology pending  Will plan for repeat  mich 4/30 to drain remaining fluid   Mobilize as able, OOB in chair, pt and family report poor activity tolerance at baseline and mostly sedentary lifestyle; rehab placement pending   Follows with Dr. Gant at [facility], will need close pulmonary follow-up upon discharge with repeat imaging in 8-12 weeks, may ultimately need bronchoscopy and further work-up of possible lung mass    Bilateral pleural effusion  - see plan for respiratory failure    COPD exacerbation  - not in acute exac  - see plan for respiratory failure    Pleuritic chest pain  - resolved   - see plan for respiratory failure    Pneumonia  - see plan for respiratory failure    Right lower lobe lung mass  - see plan for respiratory failure     Cardiac/Vascular  Chronic diastolic congestive heart failure  Last echo 9/27/2023: FINDINGS: The left ventricle is not well visualized. Normal left ventricular cavity size. Low normal left ventricular systolic function. LVEF 50 - 55%. Mild concentric hypertrophy. The right ventricle is not well visualized. Normal right ventricular size. Normal right ventricular systolic function. The left atrium is normal in size. Mild to moderately dilated right atrium. The mitral valve is not well visualized. No or trivial mitral valve regurgitation. No mitral valve stenosis. The aortic valve appears to have a tricuspid configuration (suboptimal   visualization). The aortic valve is not well visualized. No or trivial aortic valve regurgitation. No aortic valve stenosis. Mild tricuspid valve regurgitation. No tricuspid valve stenosis. The estimated right ventricular systolic pressure/PASP is 35-40 mmHg. The pulmonic valve is not well visualized. No or trivial pulmonic valve regurgitation. No pulmonic valve stenosis.   Monitor for diuretic needs  Monitor I&O trends  Monitor hemodynamics closely    Other  Debility  - after some discussion with pt and family it appears pt has been on a downward trajectory since a hospitalization in  the Fall 2023  - no appetite  - consider palliative care consult / GOC discussions       Pulmonary team will continue to follow along. Please call with questions.        Samuel Case NP  Pulmonology  [facility]

## 2024-04-30 NOTE — SUBJECTIVE & OBJECTIVE
Jason Mayfield is a 83-year-old male with a past medical history significant for hypertension, NSTEMI, chronic diastolic heart failure, COPD / emphysema, chronic hypoxic respiratory failure on home oxygen of 2L/NC, BPH, hypothyroidism, AAA, chronic back pain, neuropathy, daily alcohol use, and former tobacco abuse who presented for evaluation of left lower chest pain and shortness of breath. Patient and family endorses a dry non-productive cough which had been ongoing for approximately 5days. Denies fever / chills or diarrhea at home. Endorses constipation and reports no bowel movement for almost two weeks. On exam, some mild LUQ tenderness with palpation; however, no guarding noted. Abdomen firm to touch with faint minimal bowel sounds. Due to his severe pain, the patient reports difficulty with taking deep breaths. Pain exacerbated by cough / deep inspiration. CTA chest completed at time of presentation demonstrates extensive emphysematous changes, bilateral pleural effusions, and RLL consolidation / mass. Patient was admitted by hospital medicine and pulmonary consulted for evaluation.     Of note, patient is followed by Dr. Gant with [facility] pulmonology. 60-pack year smoking history. Previous CT imaging in September 2023 with marked emphysematous changes and some bronchial wall thickening. No evidence of mass / consolidation / nodules on imaging. Trace bilateral effusions noted. Patient is a resident in [place], [place] with reports of 3 cats and 2 dogs in their home.     Patient recently seen by Dr. Gant in March 2023 who felt his shortness of breath was multifactorial. Patient is noted to have moderate impairment on his pulmonary testing and felt his conditions were exacerbated by his underlying diastolic heart failure. In addition, it was felt a significant portion of his SOB was likely contributed to physical deconditioning as he demonstrates activity intolerance with an inability to walk greater  "than 100ft without worsening dyspnea.    Interval history, f/u chief complaint shortness of breath:  4/24: patient attempted repositioning himself in bed with notable increased work of breathing with associated wheezing noted on exam. Patient transitioned to Vapotherm 25/0.50 this morning and given IV Solu-Medrol with nebulizer treatment. Patient pulmonary status improved. Significant abdominal distention which is likely contributing to his underlying shortness of breath. Indicates some stool production yesterday; however, sounds minimal in nature in comparison to his abdominal imaging.   4/25: sedated at time of my exam; patient with improved air movement throughout on exam this morning; currently on Vapotherm 25/0.50 this morning. Patient became extremely anxious, tachypneic, and mildly tachycardic overnight. RT attempted to give scheduled neb tx, but patient had an episode of vomiting. Case and plan of care discussed with son at bedside this morning.   4/26: Patient reports he feels "terrible" this morning; complaints are vague in nature. Endorses generalized weakness. Pulmonary status significantly improved. On Vapotherm 15/0.35. Abdomen significantly softer from previous exam. By reports, several large size bowel movements yesterday afternoon.   4/28: Pulmonary status stable; increased dyspnea this am following his breathing treatment and oral meds. Minimal mobilization as reported by family at bedside. Patient and family encouraged to increase mobility. Weaning supplemental oxygen; currently on nasal cannula at 8L/NC. Denies productive cough  4/29: pt reports he was unable to get his CT this AM bc he could not lie flat s/t SOB, remains on 4L NC, appears overall quite weak and debilitated - after discussion with pt and family it appears pt has had a downward trajectory since a hospitalization in last 2023, no appetite   4/30: s/p L thora yesterday w/800cc off, CT today still with decent size effusion- will plan " for additional thora in the AM, start Augmentin for 14 days    ROS complete and negative unless stated in the interval HPI     Objective:     Vital Signs (Most Recent):  Temp: 97.6 °F (36.4 °C) (04/30/24 1214)  Pulse: 70 (04/30/24 1500)  Resp: 18 (04/30/24 1214)  BP: 126/60 (04/30/24 1214)  SpO2: 96 % (04/30/24 1214) Vital Signs (24h Range):  Temp:  [97.6 °F (36.4 °C)-98.2 °F (36.8 °C)] 97.6 °F (36.4 °C)  Pulse:  [] 70  Resp:  [15-20] 18  SpO2:  [95 %-98 %] 96 %  BP: (126-185)/(60-85) 126/60     Weight: 82 kg (180 lb 12.8 oz)  Body mass index is 25.22 kg/m².      Intake/Output Summary (Last 24 hours) at 4/30/2024 1725  Last data filed at 4/30/2024 1615  Gross per 24 hour   Intake --   Output 2550 ml   Net -2550 ml        Physical Exam  Vitals reviewed.   Constitutional:       General: He is awake. He is not in acute distress.     Appearance: He is well-developed. He is ill-appearing.      Comments: Chronically ill appearing male sitting in bed on NC in NAD eating dinner     Pulmonary:      Effort: Pulmonary effort is normal.      Breath sounds: Decreased breath sounds present.   Abdominal:      General: There is no distension.      Palpations: Abdomen is soft.   Musculoskeletal:      Comments: HOLLAND   Skin:     General: Skin is warm and dry.   Neurological:      General: No focal deficit present.      Mental Status: He is alert.   Psychiatric:         Behavior: Behavior is cooperative.             Vents:  Oxygen Concentration (%): 40 (04/28/24 0017)    Lines/Drains/Airways       Peripheral Intravenous Line  Duration                  Peripheral IV - Single Lumen 04/28/24 0600 22 G Anterior;Right Forearm 2 days                    Significant Labs:    CBC/Anemia Profile:  Recent Labs   Lab 04/29/24  0503 04/30/24  0658   WBC 14.17* 15.23*   HGB 9.8* 10.1*   HCT 30.7* 30.9*    313   MCV 97 96   RDW 13.1 13.0        Chemistries:  Recent Labs   Lab 04/29/24  0503 04/30/24  0658   * 135*   K 4.4 4.1   CL  101 98   CO2 29 30*   BUN 13 12   CREATININE 0.6 0.6   CALCIUM 9.0 8.8   ALBUMIN 2.4* 2.5*   PROT 5.3* 5.5*   BILITOT 0.3 0.4   ALKPHOS 66 66   ALT 68* 62*   AST 32 29   MG  --  2.0       All pertinent labs within the past 24 hours have been reviewed.    Significant Imaging:  I have reviewed all pertinent imaging results/findings within the past 24 hours.

## 2024-05-01 LAB
ALBUMIN SERPL BCP-MCNC: 2.4 G/DL (ref 3.5–5.2)
ALP SERPL-CCNC: 59 U/L (ref 55–135)
ALT SERPL W/O P-5'-P-CCNC: 55 U/L (ref 10–44)
ANION GAP SERPL CALC-SCNC: 5 MMOL/L (ref 8–16)
AST SERPL-CCNC: 30 U/L (ref 10–40)
BASOPHILS # BLD AUTO: 0.06 K/UL (ref 0–0.2)
BASOPHILS NFR BLD: 0.4 % (ref 0–1.9)
BILIRUB SERPL-MCNC: 0.4 MG/DL (ref 0.1–1)
BUN SERPL-MCNC: 11 MG/DL (ref 8–23)
CALCIUM SERPL-MCNC: 8.7 MG/DL (ref 8.7–10.5)
CHLORIDE SERPL-SCNC: 100 MMOL/L (ref 95–110)
CO2 SERPL-SCNC: 30 MMOL/L (ref 23–29)
CREAT SERPL-MCNC: 0.6 MG/DL (ref 0.5–1.4)
DIFFERENTIAL METHOD BLD: ABNORMAL
EOSINOPHIL # BLD AUTO: 0.2 K/UL (ref 0–0.5)
EOSINOPHIL NFR BLD: 1 % (ref 0–8)
ERYTHROCYTE [DISTWIDTH] IN BLOOD BY AUTOMATED COUNT: 13.1 % (ref 11.5–14.5)
EST. GFR  (NO RACE VARIABLE): >60 ML/MIN/1.73 M^2
GLUCOSE SERPL-MCNC: 102 MG/DL (ref 70–110)
HCT VFR BLD AUTO: 30.7 % (ref 40–54)
HGB BLD-MCNC: 10.1 G/DL (ref 14–18)
IMM GRANULOCYTES # BLD AUTO: 0.61 K/UL (ref 0–0.04)
IMM GRANULOCYTES NFR BLD AUTO: 3.9 % (ref 0–0.5)
INR PPP: 0.9 (ref 0.8–1.2)
LYMPHOCYTES # BLD AUTO: 2.1 K/UL (ref 1–4.8)
LYMPHOCYTES NFR BLD: 13.5 % (ref 18–48)
MAGNESIUM SERPL-MCNC: 2.2 MG/DL (ref 1.6–2.6)
MCH RBC QN AUTO: 31.6 PG (ref 27–31)
MCHC RBC AUTO-ENTMCNC: 32.9 G/DL (ref 32–36)
MCV RBC AUTO: 96 FL (ref 82–98)
MONOCYTES # BLD AUTO: 1.3 K/UL (ref 0.3–1)
MONOCYTES NFR BLD: 8.4 % (ref 4–15)
NEUTROPHILS # BLD AUTO: 11.3 K/UL (ref 1.8–7.7)
NEUTROPHILS NFR BLD: 72.8 % (ref 38–73)
NRBC BLD-RTO: 0 /100 WBC
PLATELET # BLD AUTO: 318 K/UL (ref 150–450)
PMV BLD AUTO: 9.5 FL (ref 9.2–12.9)
POTASSIUM SERPL-SCNC: 4.2 MMOL/L (ref 3.5–5.1)
PROT SERPL-MCNC: 5.3 G/DL (ref 6–8.4)
PROTHROMBIN TIME: 11 SEC (ref 9–12.5)
RBC # BLD AUTO: 3.2 M/UL (ref 4.6–6.2)
SODIUM SERPL-SCNC: 135 MMOL/L (ref 136–145)
WBC # BLD AUTO: 15.47 K/UL (ref 3.9–12.7)

## 2024-05-01 PROCEDURE — 25000003 PHARM REV CODE 250: Performed by: NURSE PRACTITIONER

## 2024-05-01 PROCEDURE — 36415 COLL VENOUS BLD VENIPUNCTURE: CPT | Performed by: NURSE PRACTITIONER

## 2024-05-01 PROCEDURE — 25000242 PHARM REV CODE 250 ALT 637 W/ HCPCS: Performed by: FAMILY MEDICINE

## 2024-05-01 PROCEDURE — 99900035 HC TECH TIME PER 15 MIN (STAT)

## 2024-05-01 PROCEDURE — 94799 UNLISTED PULMONARY SVC/PX: CPT

## 2024-05-01 PROCEDURE — 25000242 PHARM REV CODE 250 ALT 637 W/ HCPCS: Performed by: INTERNAL MEDICINE

## 2024-05-01 PROCEDURE — 94761 N-INVAS EAR/PLS OXIMETRY MLT: CPT

## 2024-05-01 PROCEDURE — 63600175 PHARM REV CODE 636 W HCPCS: Performed by: NURSE PRACTITIONER

## 2024-05-01 PROCEDURE — 11000001 HC ACUTE MED/SURG PRIVATE ROOM

## 2024-05-01 PROCEDURE — 97530 THERAPEUTIC ACTIVITIES: CPT

## 2024-05-01 PROCEDURE — 27000646 HC AEROBIKA DEVICE

## 2024-05-01 PROCEDURE — 27000221 HC OXYGEN, UP TO 24 HOURS

## 2024-05-01 PROCEDURE — 0W9B3ZZ DRAINAGE OF LEFT PLEURAL CAVITY, PERCUTANEOUS APPROACH: ICD-10-PCS | Performed by: STUDENT IN AN ORGANIZED HEALTH CARE EDUCATION/TRAINING PROGRAM

## 2024-05-01 PROCEDURE — 94640 AIRWAY INHALATION TREATMENT: CPT

## 2024-05-01 PROCEDURE — 80053 COMPREHEN METABOLIC PANEL: CPT | Performed by: NURSE PRACTITIONER

## 2024-05-01 PROCEDURE — 94664 DEMO&/EVAL PT USE INHALER: CPT

## 2024-05-01 PROCEDURE — 85610 PROTHROMBIN TIME: CPT | Performed by: NURSE PRACTITIONER

## 2024-05-01 PROCEDURE — 25000003 PHARM REV CODE 250: Performed by: INTERNAL MEDICINE

## 2024-05-01 PROCEDURE — 85025 COMPLETE CBC W/AUTO DIFF WBC: CPT | Performed by: NURSE PRACTITIONER

## 2024-05-01 PROCEDURE — 92526 ORAL FUNCTION THERAPY: CPT

## 2024-05-01 PROCEDURE — 97530 THERAPEUTIC ACTIVITIES: CPT | Mod: CQ

## 2024-05-01 PROCEDURE — 83735 ASSAY OF MAGNESIUM: CPT | Performed by: NURSE PRACTITIONER

## 2024-05-01 PROCEDURE — 97116 GAIT TRAINING THERAPY: CPT | Mod: CQ

## 2024-05-01 PROCEDURE — 63700000 PHARM REV CODE 250 ALT 637 W/O HCPCS: Performed by: FAMILY MEDICINE

## 2024-05-01 PROCEDURE — 63600175 PHARM REV CODE 636 W HCPCS: Performed by: INTERNAL MEDICINE

## 2024-05-01 RX ADMIN — GUAIFENESIN 600 MG: 600 TABLET, EXTENDED RELEASE ORAL at 09:05

## 2024-05-01 RX ADMIN — FINASTERIDE 5 MG: 5 TABLET, FILM COATED ORAL at 09:05

## 2024-05-01 RX ADMIN — AMOXICILLIN AND CLAVULANATE POTASSIUM 1 TABLET: 875; 125 TABLET, FILM COATED ORAL at 09:05

## 2024-05-01 RX ADMIN — SENNOSIDES AND DOCUSATE SODIUM 2 TABLET: 50; 8.6 TABLET ORAL at 09:05

## 2024-05-01 RX ADMIN — METOPROLOL TARTRATE 25 MG: 25 TABLET, FILM COATED ORAL at 09:05

## 2024-05-01 RX ADMIN — BUDESONIDE INHALATION 0.5 MG: 0.5 SUSPENSION RESPIRATORY (INHALATION) at 07:05

## 2024-05-01 RX ADMIN — PREDNISONE 20 MG: 20 TABLET ORAL at 09:05

## 2024-05-01 RX ADMIN — GABAPENTIN 1200 MG: 400 CAPSULE ORAL at 09:05

## 2024-05-01 RX ADMIN — ENOXAPARIN SODIUM 40 MG: 40 INJECTION SUBCUTANEOUS at 04:05

## 2024-05-01 RX ADMIN — ALBUTEROL SULFATE 2.5 MG: 2.5 SOLUTION RESPIRATORY (INHALATION) at 07:05

## 2024-05-01 RX ADMIN — TAMSULOSIN HYDROCHLORIDE 0.4 MG: 0.4 CAPSULE ORAL at 09:05

## 2024-05-01 RX ADMIN — OLANZAPINE 2.5 MG: 2.5 TABLET, FILM COATED ORAL at 01:05

## 2024-05-01 RX ADMIN — HYDRALAZINE HYDROCHLORIDE 50 MG: 25 TABLET, FILM COATED ORAL at 09:05

## 2024-05-01 RX ADMIN — POLYETHYLENE GLYCOL 3350 17 G: 17 POWDER, FOR SOLUTION ORAL at 09:05

## 2024-05-01 RX ADMIN — HYDROCHLOROTHIAZIDE 12.5 MG: 12.5 TABLET ORAL at 09:05

## 2024-05-01 RX ADMIN — GABAPENTIN 1200 MG: 400 CAPSULE ORAL at 03:05

## 2024-05-01 RX ADMIN — HYDRALAZINE HYDROCHLORIDE 50 MG: 25 TABLET, FILM COATED ORAL at 05:05

## 2024-05-01 RX ADMIN — FLUCONAZOLE 200 MG: 100 TABLET ORAL at 09:05

## 2024-05-01 RX ADMIN — MELATONIN TAB 3 MG 6 MG: 3 TAB at 09:05

## 2024-05-01 RX ADMIN — BISACODYL 10 MG: 10 SUPPOSITORY RECTAL at 09:05

## 2024-05-01 RX ADMIN — OLANZAPINE 2.5 MG: 2.5 TABLET, FILM COATED ORAL at 09:05

## 2024-05-01 NOTE — PROGRESS NOTES
[facility] - Med Surg 3  Pulmonology  Progress Note    Patient Name: Jason Mayfield III  MRN: [id number]  Admission Date: 4/22/2024  Hospital Length of Stay: 9 days  Code Status: Full Code  Attending Provider: Oswaldo Reid MD  Primary Care Provider: TANNER Mane MD   Principal Problem: Acute on chronic respiratory failure with hypoxia    Subjective:     Jason Mayfield is a 83-year-old male with a past medical history significant for hypertension, NSTEMI, chronic diastolic heart failure, COPD / emphysema, chronic hypoxic respiratory failure on home oxygen of 2L/NC, BPH, hypothyroidism, AAA, chronic back pain, neuropathy, daily alcohol use, and former tobacco abuse who presented for evaluation of left lower chest pain and shortness of breath. Patient and family endorses a dry non-productive cough which had been ongoing for approximately 5days. Denies fever / chills or diarrhea at home. Endorses constipation and reports no bowel movement for almost two weeks. On exam, some mild LUQ tenderness with palpation; however, no guarding noted. Abdomen firm to touch with faint minimal bowel sounds. Due to his severe pain, the patient reports difficulty with taking deep breaths. Pain exacerbated by cough / deep inspiration. CTA chest completed at time of presentation demonstrates extensive emphysematous changes, bilateral pleural effusions, and RLL consolidation / mass. Patient was admitted by hospital medicine and pulmonary consulted for evaluation.     Of note, patient is followed by Dr. Gant with [facility] pulmonology. 60-pack year smoking history. Previous CT imaging in September 2023 with marked emphysematous changes and some bronchial wall thickening. No evidence of mass / consolidation / nodules on imaging. Trace bilateral effusions noted. Patient is a resident in [place], [place] with reports of 3 cats and 2 dogs in their home.     Patient recently seen by Dr. Gant in March 2023 who felt his shortness of  "breath was multifactorial. Patient is noted to have moderate impairment on his pulmonary testing and felt his conditions were exacerbated by his underlying diastolic heart failure. In addition, it was felt a significant portion of his SOB was likely contributed to physical deconditioning as he demonstrates activity intolerance with an inability to walk greater than 100ft without worsening dyspnea.    Interval history, f/u chief complaint shortness of breath:  4/24: patient attempted repositioning himself in bed with notable increased work of breathing with associated wheezing noted on exam. Patient transitioned to Vapotherm 25/0.50 this morning and given IV Solu-Medrol with nebulizer treatment. Patient pulmonary status improved. Significant abdominal distention which is likely contributing to his underlying shortness of breath. Indicates some stool production yesterday; however, sounds minimal in nature in comparison to his abdominal imaging.   4/25: sedated at time of my exam; patient with improved air movement throughout on exam this morning; currently on Vapotherm 25/0.50 this morning. Patient became extremely anxious, tachypneic, and mildly tachycardic overnight. RT attempted to give scheduled neb tx, but patient had an episode of vomiting. Case and plan of care discussed with son at bedside this morning.   4/26: Patient reports he feels "terrible" this morning; complaints are vague in nature. Endorses generalized weakness. Pulmonary status significantly improved. On Vapotherm 15/0.35. Abdomen significantly softer from previous exam. By reports, several large size bowel movements yesterday afternoon.   4/28: Pulmonary status stable; increased dyspnea this am following his breathing treatment and oral meds. Minimal mobilization as reported by family at bedside. Patient and family encouraged to increase mobility. Weaning supplemental oxygen; currently on nasal cannula at 8L/NC. Denies productive cough  4/29: pt " reports he was unable to get his CT this AM bc he could not lie flat s/t SOB, remains on 4L NC, appears overall quite weak and debilitated - after discussion with pt and family it appears pt has had a downward trajectory since a hospitalization in last 2023, no appetite   4/30: s/p L thora yesterday w/800cc off, CT today still with decent size effusion- will plan for additional thora in the AM, start Augmentin for 14 days  5/1: repeat thora to L side planned for today to drain the remaining fluid, pt tolerating breakfast, reports he slept all night last night, overall feeling better, 2 BMs last night too     ROS complete and negative unless stated in the interval HPI     Objective:     Vital Signs (Most Recent):  Temp: 98.3 °F (36.8 °C) (05/01/24 0744)  Pulse: 70 (05/01/24 0800)  Resp: 16 (05/01/24 0755)  BP: 134/61 (05/01/24 0744)  SpO2: 95 % (05/01/24 0755) Vital Signs (24h Range):  Temp:  [97.6 °F (36.4 °C)-98.3 °F (36.8 °C)] 98.3 °F (36.8 °C)  Pulse:  [66-90] 70  Resp:  [15-18] 16  SpO2:  [95 %-97 %] 95 %  BP: (126-140)/(60-63) 134/61     Weight: 82 kg (180 lb 12.8 oz)  Body mass index is 25.22 kg/m².      Intake/Output Summary (Last 24 hours) at 5/1/2024 0945  Last data filed at 4/30/2024 2131  Gross per 24 hour   Intake --   Output 1500 ml   Net -1500 ml        Physical Exam  Vitals reviewed.   Constitutional:       General: He is awake. He is not in acute distress.     Comments: Chronically ill appearing elderly male semi-upright in bed eating breakfast on NC   Pulmonary:      Effort: Pulmonary effort is normal.      Breath sounds: Examination of the left-lower field reveals decreased breath sounds. Decreased breath sounds present.      Comments: On NC  Neurological:      Mental Status: He is alert.   Psychiatric:         Behavior: Behavior is cooperative.               Vents:  Oxygen Concentration (%): 40 (04/28/24 0017)    Lines/Drains/Airways       Peripheral Intravenous Line  Duration                   Peripheral IV - Single Lumen 04/28/24 0600 22 G Anterior;Right Forearm 3 days                    Significant Labs:    CBC/Anemia Profile:  Recent Labs   Lab 04/30/24  0658 05/01/24  0509   WBC 15.23* 15.47*   HGB 10.1* 10.1*   HCT 30.9* 30.7*    318   MCV 96 96   RDW 13.0 13.1        Chemistries:  Recent Labs   Lab 04/30/24  0658 05/01/24  0509   * 135*   K 4.1 4.2   CL 98 100   CO2 30* 30*   BUN 12 11   CREATININE 0.6 0.6   CALCIUM 8.8 8.7   ALBUMIN 2.5* 2.4*   PROT 5.5* 5.3*   BILITOT 0.4 0.4   ALKPHOS 66 59   ALT 62* 55*   AST 29 30   MG 2.0 2.2       All pertinent labs within the past 24 hours have been reviewed.    Significant Imaging:  I have reviewed all pertinent imaging results/findings within the past 24 hours.    ABG  Recent Labs   Lab 04/25/24  0036   PH 7.345*   PO2 84   PCO2 53.7*   HCO3 29.3*   BE 4*     Assessment/Plan:     Pulmonary  * Acute on chronic respiratory failure with hypoxia  Patient with chronic hypoxic respiratory failure on home oxygen of 2L/NC  CT chest imaging in September 2023 with marked emphysematous changes and some bronchial wall thickening, no evidence of mass / consolidation / nodules, trace bilateral effusions noted   CT chest April 22, 2024 with low suspicion for underlying malignancy in light of no evidence of prior mass, lesion, nodule; no mediastinal lymphadenopathy; R area of dense consolidation noted  CT chest April 30, 2024 with moderate to large left pleural effusion with associated compressive atelectasis of the left lower lobe and decreased lung consolidation in the right lower lobe   Supplemental oxygen to maintain sats 90% or greater  Sputum culture with Candida Albicans, on fluconazole for oral thrush   Legionella and fungitell negative   s/p course of Rocephin and Azith course, augmentin for 2 weeks given ongoing leukocytosis and concern for possible RLL infection  s/p left thora 4/28 that shows fluid to be exudate, culture and cytology pending  Will  plan for repeat thora 5/1 to drain remaining fluid   Mobilize as able, OOB in chair, pt and family report poor activity tolerance at baseline and mostly sedentary lifestyle; rehab placement pending   Follows with Dr. Gant at [facility], will need close pulmonary follow-up upon discharge with repeat imaging in 8-12 weeks, may ultimately need bronchoscopy and further work-up of possible lung mass    Bilateral pleural effusion  - see plan for respiratory failure    COPD exacerbation  - not in acute exac  - see plan for respiratory failure    Pleuritic chest pain  - resolved   - see plan for respiratory failure    Pneumonia  - see plan for respiratory failure    Right lower lobe lung mass  - see plan for respiratory failure     Cardiac/Vascular  Chronic diastolic congestive heart failure  Last echo 9/27/2023: FINDINGS: The left ventricle is not well visualized. Normal left ventricular cavity size. Low normal left ventricular systolic function. LVEF 50 - 55%. Mild concentric hypertrophy. The right ventricle is not well visualized. Normal right ventricular size. Normal right ventricular systolic function. The left atrium is normal in size. Mild to moderately dilated right atrium. The mitral valve is not well visualized. No or trivial mitral valve regurgitation. No mitral valve stenosis. The aortic valve appears to have a tricuspid configuration (suboptimal   visualization). The aortic valve is not well visualized. No or trivial aortic valve regurgitation. No aortic valve stenosis. Mild tricuspid valve regurgitation. No tricuspid valve stenosis. The estimated right ventricular systolic pressure/PASP is 35-40 mmHg. The pulmonic valve is not well visualized. No or trivial pulmonic valve regurgitation. No pulmonic valve stenosis.   Monitor for diuretic needs  Monitor I&O trends  Monitor hemodynamics closely    Other  Debility  - after some discussion with pt and family it appears pt has been on a downward trajectory since a  hospitalization in the Fall 2023  - no appetite  - consider palliative care consult / GOC discussions       Pulmonary team will remain available, but not plan to see daily. Please call if we can be of assistance sooner or if questions should arise.        Samuel Case NP  Pulmonology  [facility] - Med Surg 3

## 2024-05-01 NOTE — ASSESSMENT & PLAN NOTE
Patient's COPD is with exacerbation noted by continued dyspnea and worsening of baseline hypoxia currently.  Patient is currently off COPD Pathway. Continue scheduled inhalers Antibiotics and Supplemental oxygen and monitor respiratory status closely.     Pt received 3 days of IV solumedrol and has completed a prednisone taper.

## 2024-05-01 NOTE — ASSESSMENT & PLAN NOTE
We will treat for pneumonia with Rocephin and Zithromax at this time  Blood cultures show NGTD  Sputum culture grew Yeast- cont Fluconazole   Pulmonology recommended Augmentin x 2 weeks, f/u with Pulmonology

## 2024-05-01 NOTE — ASSESSMENT & PLAN NOTE
Patient found to have moderate pleural effusion on imaging. I have personally reviewed and interpreted the following imaging: Xray and CT. A thoracentesis was deferred pending pulmonology consultation.  Most likely etiology includes  possible malignancy with new lung mass discovered on CT scan, and pneumonia    Pulmonology recommended IR thoracentesis which was done on 4/29/24 removing 800 ml pleural fluid which was exudative. Pleural fluid culture showed -no significant isolates/no organisms seen.Pathology pending.   Repeat CT chest showed Moderate to large left pleural effusion and Decreased lung consolidation in the right lower lobe and small dependent right pleural effusion compared to 04/22/2024.   Pulmonology recommended repeat thoracentesis on 5/1/24 - IR consulted an performed thoracentesis removing 750 ccs of dark rebecca fluid

## 2024-05-01 NOTE — PT/OT/SLP PROGRESS
Physical Therapy      Patient Name:  Jason Mayfield III   MRN:  [id number]    11:00 a.m.  Patient receiving a thoracentesis at this time.   Will follow up at next available opportunity.

## 2024-05-01 NOTE — PLAN OF CARE
Problem: Fall Injury Risk  Goal: Absence of Fall and Fall-Related Injury  Outcome: Progressing     Problem: Skin Injury Risk Increased  Goal: Skin Health and Integrity  Outcome: Progressing

## 2024-05-01 NOTE — PT/OT/SLP PROGRESS
Occupational Therapy   Treatment    Name: Jason Mayfield III  MRN: [id number]  Admitting Diagnosis:  Acute on chronic respiratory failure with hypoxia       Recommendations:     Discharge Recommendations: High Intensity Therapy  Discharge Equipment Recommendations:  to be determined by next level of care  Barriers to discharge:  None    Assessment:     Jason Mayfield III is a 83 y.o. male with a medical diagnosis of Acute on chronic respiratory failure with hypoxia.  He presents with the following performance deficits affecting function are weakness, impaired endurance, impaired self care skills, impaired functional mobility, gait instability, impaired balance, decreased coordination, decreased upper extremity function, decreased lower extremity function, decreased safety awareness, pain, decreased ROM, impaired cardiopulmonary response to activity.     Rehab Prognosis:  Good; patient would benefit from acute skilled OT services to address these deficits and reach maximum level of function.       Plan:     Patient to be seen 2 x/week to address the above listed problems via self-care/home management, therapeutic activities, therapeutic exercises  Plan of Care Expires: 05/08/24  Plan of Care Reviewed with: patient, son, spouse    Subjective     Chief Complaint: SOB with exertion, pain, weakness  Patient/Family Comments/goals: improve strength and mobility  Pain/Comfort:  Pain Rating 1: 5/10  Location - Side 1: Left  Location - Orientation 1: lateral  Location 1: chest  Pain Addressed 1: Reposition, Distraction  Pain Rating Post-Intervention 1: 5/10    Objective:     Communicated with: Nurse and epic chart review prior to session.  Patient found HOB elevated with telemetry, peripheral IV, oxygen upon OT entry to room.    General Precautions: Standard, fall, respiratory    Orthopedic Precautions:N/A  Braces: N/A  Respiratory Status: Nasal cannula, flow 3 L/min     Occupational Performance:     Bed Mobility:    Patient  completed Rolling/Turning to Right with contact guard assistance  Patient completed Scooting/Bridging with contact guard assistance  Patient completed Supine to Sit with contact guard assistance     Functional Mobility/Transfers:  Patient completed Sit <> Stand Transfer with minimum assistance  with  rolling walker   Patient completed Bed <> Chair Transfer using Stand Pivot technique with minimum assistance with rolling walker  Functional Mobility: Patient completed x40ft functional mobility with Min A and RW, O2 in tow, to increase dynamic standing balance and activity tolerance needed for ADL completion. Pt SpO2 87% after ambulating.    Activities of Daily Living:  Upper Body Dressing: moderate assistance rafael gown around back    [facility] 6 Click ADL: 16    Treatment & Education:  Educated pt on pursed lip breathing technique and importance of activity pacing for SOB. Educated pt on BUE AROM shoulder flexion, elbow flexion/ext, wrist flexion/ext, and digit flexion/ext; pt demonstrated understanding by performing x5 reps each. Reviewed role of OT in acute setting and benefits of participation. Educated on techniques to use to increase independence and decrease fall risk with functional transfers. Educated on importance of OOB activity and calling for A to transfer back to bed. Encouraged completion of B UE AROM therex throughout the day to tolerance to increase functional strength and activity tolerance. Educated patient on importance of increased tolerance to upright position and direct impact on CV endurance and strength. Patient encouraged to sit up in chair for a minimum of 2 consecutive hours per day. Patient stated understanding and in agreement with POC.     Patient left up in chair with all lines intact, call button in reach, chair alarm on, and family present    GOALS:   Multidisciplinary Problems       Occupational Therapy Goals          Problem: Occupational Therapy    Goal Priority Disciplines Outcome  Interventions   Occupational Therapy Goal     OT, PT/OT Progressing    Description: Goals to be met by: 5/8/24     Patient will increase functional independence with ADLs by performing:    UE Dressing with Modified [place].  Grooming while standing at sink with Modified [place].  Toileting from toilet with Modified [place] for hygiene and clothing management.   Toilet transfer to toilet with Contact Guard Assistance.  Upper extremity exercise program x15 reps per handout, with independence.                         Time Tracking:     OT Date of Treatment: 05/01/24  OT Start Time: 1130  OT Stop Time: 1155  OT Total Time (min): 25 min    Billable Minutes:Therapeutic Activity 25    OT/DONYA: OT      Celia Meng OT     5/1/2024

## 2024-05-01 NOTE — PLAN OF CARE
05/01/24 1223   Post-Acute Status   Post-Acute Authorization Placement   Post-Acute Placement Status Pending Bed Availability   Discharge Delays None known at this time   Discharge Plan   Discharge Plan A Rehab       Per Treatment Team rounds, Patient is medically cleared for DC, however [facility], will not have bed available until tomorrow, Thursday 5/2.   Per Ema, they are only allowing 3 admits today and Patient is 4th in line on their admit list. Per Ema, they can admit Patient tomorrow.     SW will continue to follow and assist as needed.    Yes

## 2024-05-01 NOTE — PT/OT/SLP PROGRESS
Speech Language Pathology Treatment    Patient Name:  Jason Mayfield III   MRN:  [id number]  Admitting Diagnosis: Acute on chronic respiratory failure with hypoxia    Recommendations:                 General Recommendations:  Follow-up not indicated  Diet recommendations:  Soft & Bite Sized Diet - IDDSI Level 6, Liquid Diet Level: Thin liquids - IDDSI Level 0   Aspiration Precautions: Standard aspiration precautions   General Precautions: Standard, aspiration  Communication strategies:  none    Assessment:     Jason Mayfield III is a 83 y.o. male who presented to the ED with complaints of chest pain and dyspnea. It is recommended he continue with soft and bite sized consistencies (IDDSI 6) and thin liquids (IDDSI 0). He completed all PO trials with no overt s/s of aspiration today. No further acute ST warranted. Please re-consult if needed.     Subjective     Patient seen at bedside with family present. He had recently eaten lunch and reported no overt s/s of aspiration.  Patient goals: To go home      Pain/Comfort:  Pain Rating 1: 0/10    Respiratory Status:  nasal cannula    Objective:     Has the patient been evaluated by SLP for swallowing?   Yes  Keep patient NPO? No   Current Respiratory Status:        Patient completed lunch (IDDSI 6/IDDSI0) with no reported s/s of aspiration.   Patient participated in PO trials with clinician today of thin liquid (cup sip with and without straw) and solid consistency (cracker). Patient presented with  oral phase characterized by lingual, labial, and buccal strength and range of motion adequate for lip closure, bolus preparation and propulsion. The patient had no anterior loss of the bolus with complete closure of the lips around the utensils. No significant residue remained in the oral cavity following the swallow. Patient without overt clinical signs/symptoms of aspiration on any PO trials given. No further acute ST warranted. Please re-consult if needed.     Goals:    Multidisciplinary Problems       SLP Goals       Not on file              Multidisciplinary Problems (Resolved)          Problem: SLP    Goal Priority Disciplines Outcome   SLP Goal   (Resolved)     SLP Met   Description: TPW tolerate the least restrictive diet without any overt s/s of aspiration  TPW recall and/or follow aspiration precautions to decrease risk of aspiration                       Plan:     Patient to be seen:  2 x/week, 3 x/week   Plan of Care expires:  05/07/24  Plan of Care reviewed with:  patient, spouse   SLP Follow-Up:  No       Discharge recommendations:   (pending pt acute progress)   Barriers to Discharge:  None    Time Tracking:     SLP Treatment Date:   05/01/24  Speech Start Time:  1238  Speech Stop Time:  1246     Speech Total Time (min):  8 min    Billable Minutes: Treatment Swallowing Dysfunction 8 minutes    05/01/2024

## 2024-05-01 NOTE — ASSESSMENT & PLAN NOTE
Patient with chronic hypoxic respiratory failure on home oxygen of 2L/NC  CT chest imaging in September 2023 with marked emphysematous changes and some bronchial wall thickening, no evidence of mass / consolidation / nodules, trace bilateral effusions noted   CT chest April 22, 2024 with low suspicion for underlying malignancy in light of no evidence of prior mass, lesion, nodule; no mediastinal lymphadenopathy; R area of dense consolidation noted  CT chest April 30, 2024 with moderate to large left pleural effusion with associated compressive atelectasis of the left lower lobe and decreased lung consolidation in the right lower lobe   Supplemental oxygen to maintain sats 90% or greater  Sputum culture with Candida Albicans, on fluconazole for oral thrush   Legionella and fungitell negative   s/p course of Rocephin and Azith course, augmentin for 2 weeks given ongoing leukocytosis and concern for possible RLL infection  s/p left thora 4/28 that shows fluid to be exudate, culture and cytology pending  Will plan for repeat thora 5/1 to drain remaining fluid   Mobilize as able, OOB in chair, pt and family report poor activity tolerance at baseline and mostly sedentary lifestyle; rehab placement pending   Follows with Dr. Gant at [facility], will need close pulmonary follow-up upon discharge with repeat imaging in 8-12 weeks, may ultimately need bronchoscopy and further work-up of possible lung mass

## 2024-05-01 NOTE — PLAN OF CARE
Discussed poc with pt and family, verbalized understanding     Purposeful rounding every 2hours    VS wnl  Cardiac monitoring in use  Fall precautions in place, remains injury free  Pt denies c/o n/v and pain    Accurate I&Os  Bed locked at lowest position  Call light within reach    Chart check complete  Will cont with POC    Problem: Fall Injury Risk  Goal: Absence of Fall and Fall-Related Injury  Outcome: Progressing     Problem: Skin Injury Risk Increased  Goal: Skin Health and Integrity  Outcome: Progressing

## 2024-05-01 NOTE — SUBJECTIVE & OBJECTIVE
Jason Mayfield is a 83-year-old male with a past medical history significant for hypertension, NSTEMI, chronic diastolic heart failure, COPD / emphysema, chronic hypoxic respiratory failure on home oxygen of 2L/NC, BPH, hypothyroidism, AAA, chronic back pain, neuropathy, daily alcohol use, and former tobacco abuse who presented for evaluation of left lower chest pain and shortness of breath. Patient and family endorses a dry non-productive cough which had been ongoing for approximately 5days. Denies fever / chills or diarrhea at home. Endorses constipation and reports no bowel movement for almost two weeks. On exam, some mild LUQ tenderness with palpation; however, no guarding noted. Abdomen firm to touch with faint minimal bowel sounds. Due to his severe pain, the patient reports difficulty with taking deep breaths. Pain exacerbated by cough / deep inspiration. CTA chest completed at time of presentation demonstrates extensive emphysematous changes, bilateral pleural effusions, and RLL consolidation / mass. Patient was admitted by hospital medicine and pulmonary consulted for evaluation.     Of note, patient is followed by Dr. Gant with [facility] pulmonology. 60-pack year smoking history. Previous CT imaging in September 2023 with marked emphysematous changes and some bronchial wall thickening. No evidence of mass / consolidation / nodules on imaging. Trace bilateral effusions noted. Patient is a resident in [place], [place] with reports of 3 cats and 2 dogs in their home.     Patient recently seen by Dr. Gant in March 2023 who felt his shortness of breath was multifactorial. Patient is noted to have moderate impairment on his pulmonary testing and felt his conditions were exacerbated by his underlying diastolic heart failure. In addition, it was felt a significant portion of his SOB was likely contributed to physical deconditioning as he demonstrates activity intolerance with an inability to walk greater  "than 100ft without worsening dyspnea.    Interval history, f/u chief complaint shortness of breath:  4/24: patient attempted repositioning himself in bed with notable increased work of breathing with associated wheezing noted on exam. Patient transitioned to Vapotherm 25/0.50 this morning and given IV Solu-Medrol with nebulizer treatment. Patient pulmonary status improved. Significant abdominal distention which is likely contributing to his underlying shortness of breath. Indicates some stool production yesterday; however, sounds minimal in nature in comparison to his abdominal imaging.   4/25: sedated at time of my exam; patient with improved air movement throughout on exam this morning; currently on Vapotherm 25/0.50 this morning. Patient became extremely anxious, tachypneic, and mildly tachycardic overnight. RT attempted to give scheduled neb tx, but patient had an episode of vomiting. Case and plan of care discussed with son at bedside this morning.   4/26: Patient reports he feels "terrible" this morning; complaints are vague in nature. Endorses generalized weakness. Pulmonary status significantly improved. On Vapotherm 15/0.35. Abdomen significantly softer from previous exam. By reports, several large size bowel movements yesterday afternoon.   4/28: Pulmonary status stable; increased dyspnea this am following his breathing treatment and oral meds. Minimal mobilization as reported by family at bedside. Patient and family encouraged to increase mobility. Weaning supplemental oxygen; currently on nasal cannula at 8L/NC. Denies productive cough  4/29: pt reports he was unable to get his CT this AM bc he could not lie flat s/t SOB, remains on 4L NC, appears overall quite weak and debilitated - after discussion with pt and family it appears pt has had a downward trajectory since a hospitalization in last 2023, no appetite   4/30: s/p L thora yesterday w/800cc off, CT today still with decent size effusion- will plan " for additional thora in the AM, start Augmentin for 14 days  5/1: repeat thora to L side planned for today to drain the remaining fluid, pt tolerating breakfast, reports he slept all night last night, overall feeling better, 2 BMs last night too     ROS complete and negative unless stated in the interval HPI     Objective:     Vital Signs (Most Recent):  Temp: 98.3 °F (36.8 °C) (05/01/24 0744)  Pulse: 70 (05/01/24 0800)  Resp: 16 (05/01/24 0755)  BP: 134/61 (05/01/24 0744)  SpO2: 95 % (05/01/24 0755) Vital Signs (24h Range):  Temp:  [97.6 °F (36.4 °C)-98.3 °F (36.8 °C)] 98.3 °F (36.8 °C)  Pulse:  [66-90] 70  Resp:  [15-18] 16  SpO2:  [95 %-97 %] 95 %  BP: (126-140)/(60-63) 134/61     Weight: 82 kg (180 lb 12.8 oz)  Body mass index is 25.22 kg/m².      Intake/Output Summary (Last 24 hours) at 5/1/2024 0945  Last data filed at 4/30/2024 2131  Gross per 24 hour   Intake --   Output 1500 ml   Net -1500 ml        Physical Exam  Vitals reviewed.   Constitutional:       General: He is awake. He is not in acute distress.     Comments: Chronically ill appearing elderly male semi-upright in bed eating breakfast on NC   Pulmonary:      Effort: Pulmonary effort is normal.      Breath sounds: Examination of the left-lower field reveals decreased breath sounds. Decreased breath sounds present.      Comments: On NC  Neurological:      Mental Status: He is alert.   Psychiatric:         Behavior: Behavior is cooperative.               Vents:  Oxygen Concentration (%): 40 (04/28/24 0017)    Lines/Drains/Airways       Peripheral Intravenous Line  Duration                  Peripheral IV - Single Lumen 04/28/24 0600 22 G Anterior;Right Forearm 3 days                    Significant Labs:    CBC/Anemia Profile:  Recent Labs   Lab 04/30/24  0658 05/01/24  0509   WBC 15.23* 15.47*   HGB 10.1* 10.1*   HCT 30.9* 30.7*    318   MCV 96 96   RDW 13.0 13.1        Chemistries:  Recent Labs   Lab 04/30/24  0658 05/01/24  0509   * 135*    K 4.1 4.2   CL 98 100   CO2 30* 30*   BUN 12 11   CREATININE 0.6 0.6   CALCIUM 8.8 8.7   ALBUMIN 2.5* 2.4*   PROT 5.5* 5.3*   BILITOT 0.4 0.4   ALKPHOS 66 59   ALT 62* 55*   AST 29 30   MG 2.0 2.2       All pertinent labs within the past 24 hours have been reviewed.    Significant Imaging:  I have reviewed all pertinent imaging results/findings within the past 24 hours.

## 2024-05-01 NOTE — ASSESSMENT & PLAN NOTE
Mass in the right lower lobe in the roseanne fissural region measures 6.2 by 10 cm worrisome for malignancy  PNA verus mass- as his last CT scan in September was negative we will continue to follow  Pt received 7 days of IV Rocephin and 5 days of Azithromycin.   Appears to be PNA- cont Augmentin x 14 days and f/u with Pulmonology

## 2024-05-01 NOTE — PT/OT/SLP PROGRESS
Physical Therapy  Treatment    Jason Mayfield III   MRN: [id number]   Admitting Diagnosis: Acute on chronic respiratory failure with hypoxia    PT Received On: 05/01/24  PT Start Time: 1130     PT Stop Time: 1155    PT Total Time (min): 25 min       Billable Minutes:  Gait Training 10 and Therapeutic Activity 15    Treatment Type: Treatment  PT/PTA: PTA     Number of PTA visits since last PT visit: 1       General Precautions: Standard, fall, respiratory  Orthopedic Precautions: N/A  Braces: N/A  Respiratory Status: Nasal cannula, flow 3 L/min    Spiritual, Cultural Beliefs, [employer] Practices, Values that Affect Care: no    Subjective:  Communicated with patient's nurse, Cecille, and completed Epic chart review prior to session.  Patient agreed to PT session with mild encouragement from therapist and son.     Pain/Comfort  Pain Rating 1: 5/10  Location - Side 1: Left  Location - Orientation 1: lateral  Location 1: chest  Pain Addressed 1: Other (see comments) (ACTIVITY PACING)  Pain Rating Post-Intervention 1: 5/10    Objective:   Patient found with: peripheral IV, telemetry, oxygen    Supine > sit EOB: CGA    Forward scoot towards EOB: CGA    STS from EOB > RW: Min A     40ft w/ RW Min A   SpO2 post gait training was initially 87% but patient recovered quickly to 94% with cues for pursed lip breathing technique.     Stand pivot T/F to chair w/ RW: Min A     Reviewed AROM TE to BLE including: hip flex/ext, knee flex/ext, ankle PF/DF  To be completed a minimum of 10 reps for each LE in order to promote return of function, strength and ROM.     Educated patient on importance of increased tolerance to upright position and direct impact on CV endurance and strength. Patient encouraged to sit up in chair/ EOB, for a minimum of 2 consecutive hours, 3x per day. Encouraged patient to perform AROM TE to BLE throughout the day within all available planes of motion. Re enforced importance of utilizing call light to meet needs  in room and not attempt to get up without staff assistance. Patient verbalized understanding and agreed to comply.       AM-PAC 6 CLICK MOBILITY  How much help from another person does this patient currently need?   1 = Unable, Total/Dependent Assistance  2 = A lot, Maximum/Moderate Assistance  3 = A little, Minimum/Contact Guard/Supervision  4 = None, Modified [place]/Independent    Turning over in bed (including adjusting bedclothes, sheets and blankets)?: 3  Sitting down on and standing up from a chair with arms (e.g., wheelchair, bedside commode, etc.): 3  Moving from lying on back to sitting on the side of the bed?: 3  Moving to and from a bed to a chair (including a wheelchair)?: 3  Need to walk in hospital room?: 3  Climbing 3-5 steps with a railing?: 1 (NT)  Basic Mobility Total Score: 16    AM-PAC Raw Score CMS G-Code Modifier Level of Impairment Assistance   6 % Total / Unable   7 - 9 CM 80 - 100% Maximal Assist   10 - 14 CL 60 - 80% Moderate Assist   15 - 19 CK 40 - 60% Moderate Assist   20 - 22 CJ 20 - 40% Minimal Assist   23 CI 1-20% SBA / CGA   24 CH 0% Independent/ Mod I     Patient left up in chair with call button in reach and family members present.    Assessment:  Jason Mayfield III is a 83 y.o. male with a medical diagnosis of Acute on chronic respiratory failure with hypoxia and presents with overall decline in functional mobility. Patient would continue to benefit from skilled PT to address functional limitations listed below in order to return to PLOF/decrease caregiver burden.     Rehab identified problem list/impairments: weakness, impaired endurance, impaired self care skills, impaired functional mobility, gait instability, impaired balance, decreased upper extremity function, decreased lower extremity function, decreased safety awareness, pain, decreased ROM, impaired coordination, impaired cardiopulmonary response to activity    Rehab potential is fair.    Activity tolerance:  Fair    Discharge recommendations: High Intensity Therapy      Barriers to discharge:      Equipment recommendations: to be determined by next level of care     GOALS:   Multidisciplinary Problems       Physical Therapy Goals          Problem: Physical Therapy    Goal Priority Disciplines Outcome Goal Variances Interventions   Physical Therapy Goal     PT, PT/OT Progressing     Description: Goals to be met by 5/8/24.  1. Pt will complete bed mobility MOD I.  2. Pt will complete sit to stand MOD I.  3. Pt will ambulate 200ft MOD I using RW.  4. Pt will increase AMPAC score by 2 points to progress functional mobility.                       PLAN:    Patient to be seen 3 x/week to address the above listed problems via gait training, therapeutic activities, therapeutic exercises  Plan of Care expires: 05/08/24  Plan of Care reviewed with: patient, spouse, son         05/01/2024

## 2024-05-01 NOTE — OP NOTE
Pre Op Diagnosis: left pleural effusion     Post Op Diagnosis: same     Procedure:  left thora     Procedure performed by: Shanti ANDUJAR, Goldy CUETO     Written Informed Consent Obtained: Yes     Specimen Removed:  yes     Estimated Blood Loss:  minimal     Findings: Local anesthesia     Sedation:  no     The patient tolerated the procedure well and there were no complications.        Sterile technique was performed in the posterior left thorax, lidocaine was used as a local anesthetic.  750 ccs of dark rebecca.  Pt tolerated the procedure well without immediate complications.  Please see radiologist report for details. F/u with PCP and/or ordering physician.

## 2024-05-01 NOTE — PROGRESS NOTES
[facility] [address] [place] [address] Medicine  Progress Note    Patient Name: Jason Mayfield III  MRN: [id number]  Patient Class: IP- Inpatient   Admission Date: 4/22/2024  Length of Stay: 9 days  Attending Physician: Oswaldo Reid MD  Primary Care Provider: TANNER Mane MD        Subjective:     Principal Problem:Acute on chronic respiratory failure with hypoxia        HPI:  Patient is 83-year-old male with past medical history significant for hypertension, NSTEMI, diastolic congestive heart failure, COPD, chronic hypoxic respiratory failure on home O2 @ 2L/min NC, BPH,  fibroadenoma of breast, hypothyroidism, infrarenal AAA(3.4 cm),  chronic back pain, neuropathy, shingles infection, kidney stones, colonic polyps, daily alcohol use, and former tobacco abuse who presented to ED with complaints of chest pain and dyspnea.  He reports  that for the past 2-3 days he has been having left upper chest pain, moderate, worsened with deep breathing, chronic cough, with no radiation into neck, jaw, or arms.  He also reports some generalized weakness and trouble walking over the past 2-3 days as well.  He reports some wheezing although states that this is his baseline. He denies fever, chills , abdominal pain, nausea, vomiting, diarrhea, leg pain, dysuria, or worsening edema. he states that he is normally on 2 L per minute nasal cannula, however over the past couple of days he has turned it up to 3-4 liters/minute.  He is followed by Dr. Gant.  Vital signs on arrival to ED-[facility]  with 98.3 temp, heart rate 107, respiratory rate 25, blood pressure 135/62, 91% SpO2 on 4 liters/minute nasal cannula.  He has remained afebrile while in ED. oxygen has been weaned to 3 liters/minute nasal cannula with SpO2 96%.  Lab workup reveals WBC 17.33, hemoglobin 11.5, hematocrit 34.5, sodium 131, potassium 4.7, chloride 93, CO2 24, BUN 19, creatinine 0.8, glucose 129, normal LFTs, BNP 85, troponin 0.010, lactic acid 1.0, procalcitonin  0.09,  and normal urinalysis.  EKG:  Sinus tach with first-degree AV block and right bundle-branch block, heart rate 102.  Chest x-ray notes thoracic spinal cord stimulator leads, normal heart size, mild aortic atherosclerosis, mild volume loss with vascular crowding in both lung bases.  CTA of chest shows extensive emphysema, moderate left-sided pleural effusion, mild right-sided pleural effusion, moderate right basilar atelectasis versus consolidation and mild left basilar atelectasis versus consolidation, suggestion of mass in the right lower lobe perifissural region measuring 6.2 x 10 cm, worrisome for malignancy.  He was given Rocephin, DuoNeb, Solu-Medrol, gabapentin, and tramadol while in ED. Hospital Medicine was consulted for admission due to worsening hypoxic respiratory failure, pneumonia, and newly discovered lung mass.    Overview/Hospital Course:  Patient is an 83-year-old male with a history of hypertension, coronary artery disease, diastolic dysfunction, COPD with chronic hypoxic respiratory on 2 L nasal cannula, BPH, chronic back pain, neuropathy, daily alcohol use who presented emergency room with worsening left upper chest pain that was pleuritic in nature.  He says that he has had worsening generalized weakness and trouble walking over 2-3 days prior to admission.  CTA of the chest revealed extensive emphysema moderate left-sided pleural effusion mild right-sided pleural effusion with right-sided atelectasis versus consolidation, question of a mass in the right lower lobe measuring 6.2 x 10 cm.    Of note, CT chest imaging in September 2023 with marked emphysematous changes and some bronchial wall thickening, no evidence of mass / consolidation / nodules, trace bilateral effusions noted    The pt was admitted after cultures were obtained started on IV antibiotics for possible aspiration pneumonia. Patient respiratory status worsened. Pulmonary evaluated the patient and he was started on Vapotherm  25/50 Solu-Medrol IV and nebulizer. Patient's shortness of breath improved.  He still has significant abdominal distention with minimal stool output.    Constipation-patient has had no bowel movement for 10 days prior to admission.  X-rays revealed large amount of stool.  He received multiple cathartics including to enemas. He had several alrge BMs. He then went another 3 days with no BM. Miralax increased to TID along with pericolace. Mag citrate given    Patient was oxygen requirements have continued to improve-on 3 liters NC.Pt received 3 days of IV solumedrol and has completed a prednisone taper. Nonproductive cough.  Per Pulmonary  Left pleural effusion appears slightly larger from prior imaging; obtain CT chest in am for further evaluation; may need thoracentesis if larger or appears loculated  Close pulmonary follow-up upon discharge. May ultimately need bronchoscopy and further work-up of lung mass. Repeat imaging for clearance / improvement in 8-12 weeks optimal prior to further work-up.  Patient has continued to be encouraged to participate with physical therapy and be out of bed 2-3 times per day.    Pulmonology recommended IR thoracentesis which was done on 4/29/24 removing 800 ml pleural fluid which was exudative. Pleural fluid culture showed -no significant isolates/no organisms seen. Pathology pending. SOB improved.   Repeat CT chest showed Moderate to large left pleural effusion and Decreased lung consolidation in the right lower lobe and small dependent right pleural effusion compared to 04/22/2024.   Pulmonology recommended repeat thoracentesis on 5/1/24.   Pt received 7 days of IV Rocephin and 5 days of Azithromycin. Pulmonology recommended Augmentin x 2 weeks with close pulmonology f/u. Repeat thoracentesis done per IR removing 750ml of dark rebecca fluid.   CM consulted for rehab placement- [facility] accepted and pt will be d/c tomorrow               Interval History:  Patient seen and examined  with family at bedside.  AAOX3, participating in therapy. Rehab placement tomorrow      Review of Systems   Constitutional:  Positive for activity change, appetite change and fatigue. Negative for chills, diaphoresis and fever.   HENT:  Negative for congestion, nosebleeds, sore throat and trouble swallowing.    Eyes:  Negative for pain, discharge and visual disturbance.   Respiratory:  Positive for shortness of breath (improved). Negative for apnea, cough, chest tightness, wheezing and stridor.    Cardiovascular:  Negative for chest pain, palpitations and leg swelling.   Gastrointestinal:  Positive for constipation. Negative for abdominal distention, abdominal pain (Improving), blood in stool, diarrhea, nausea and vomiting.   Endocrine: Negative for cold intolerance and heat intolerance.   Genitourinary:  Positive for difficulty urinating. Negative for dysuria, flank pain, frequency and urgency.   Musculoskeletal:  Positive for arthralgias, back pain and neck pain. Negative for joint swelling, myalgias and neck stiffness.   Skin:  Negative for rash and wound.   Allergic/Immunologic: Negative for food allergies and immunocompromised state.   Neurological:  Positive for weakness. Negative for dizziness, seizures, syncope, facial asymmetry, light-headedness and headaches.   Hematological:  Negative for adenopathy.   Psychiatric/Behavioral:  Negative for agitation, behavioral problems and confusion. The patient is not nervous/anxious.    All other systems reviewed and are negative.    Objective:     Vital Signs (Most Recent):  Temp: 97.6 °F (36.4 °C) (04/30/24 1214)  Pulse: 66 (04/30/24 1214)  Resp: 18 (04/30/24 1214)  BP: 126/60 (04/30/24 1214)  SpO2: 96 % (04/30/24 1214) Vital Signs (24h Range):  Temp:  [97.6 °F (36.4 °C)-98.4 °F (36.9 °C)] 97.6 °F (36.4 °C)  Pulse:  [] 66  Resp:  [15-20] 18  SpO2:  [94 %-99 %] 96 %  BP: (126-185)/(60-85) 126/60     Weight: 82 kg (180 lb 12.8 oz)  Body mass index is 25.22  kg/m².    Intake/Output Summary (Last 24 hours) at 4/30/2024 1244  Last data filed at 4/30/2024 0500  Gross per 24 hour   Intake --   Output 1300 ml   Net -1300 ml         Physical Exam  Vitals reviewed.   Constitutional:       Appearance: He is ill-appearing.   HENT:      Head: Normocephalic and atraumatic.      Mouth/Throat:      Mouth: Mucous membranes are moist.      Pharynx: Oropharynx is clear.   Eyes:      Extraocular Movements: Extraocular movements intact.      Conjunctiva/sclera: Conjunctivae normal.   Cardiovascular:      Rate and Rhythm: Normal rate and regular rhythm.      Pulses: Normal pulses.      Heart sounds: Normal heart sounds.   Pulmonary:      Effort: No respiratory distress.      Breath sounds: Decreased breath sounds present. No rhonchi.   Abdominal:      General: Bowel sounds are normal. There is distension (less so).      Palpations: Abdomen is soft.   Genitourinary:     Comments: Urinary retention requiring Queen catheter placement  Musculoskeletal:         General: Normal range of motion.      Cervical back: Normal range of motion and neck supple.   Skin:     General: Skin is warm and dry.   Neurological:      General: No focal deficit present.      Mental Status: He is alert and oriented to person, place, and time. Mental status is at baseline.             Significant Labs: All pertinent labs within the past 24 hours have been reviewed.  CBC:   Recent Labs   Lab 04/29/24  0503 04/30/24  0658   WBC 14.17* 15.23*   HGB 9.8* 10.1*   HCT 30.7* 30.9*    313     CMP:   Recent Labs   Lab 04/29/24  0503 04/30/24  0658   * 135*   K 4.4 4.1    98   CO2 29 30*   GLU 94 95   BUN 13 12   CREATININE 0.6 0.6   CALCIUM 9.0 8.8   PROT 5.3* 5.5*   ALBUMIN 2.4* 2.5*   BILITOT 0.3 0.4   ALKPHOS 66 66   AST 32 29   ALT 68* 62*   ANIONGAP 5* 7*       Significant Imaging: I have reviewed all pertinent imaging results/findings within the past 24 hours.    Assessment/Plan:      * Acute on  chronic respiratory failure with hypoxia  Patient with Hypoxic Respiratory failure which is Acute on chronic.  he is on home oxygen at 2 LPM. Supplemental oxygen was provided and noted-      .   Signs/symptoms of respiratory failure include- tachypnea, increased work of breathing, use of accessory muscles, and wheezing. Contributing diagnoses includes - COPD, Pleural effusion, Pneumonia, and lung mass  Labs and images were reviewed. Patient Has not had a recent ABG. Will treat underlying causes and adjust management of respiratory failure as follows-     Was given Solu-medrol x one dose per ED, with increased shortness breath and wheezing Solu-Medrol as we will do every 6 hours in addition to nebs.  Supplemental oxygen has been increased to Vapotherm at 10/50.      Continue supplemental oxygen.  We will treat with antibiotics, nebs, pulmonary hygiene    Pneumonia  We will treat for pneumonia with Rocephin and Zithromax at this time  Blood cultures show NGTD  Sputum culture grew Yeast- cont Fluconazole   Pulmonology recommended Augmentin x 2 weeks, f/u with Pulmonology      Right lower lobe lung mass  Mass in the right lower lobe in the roseanne fissural region measures 6.2 by 10 cm worrisome for malignancy  PNA verus mass- as his last CT scan in September was negative we will continue to follow  Pt received 7 days of IV Rocephin and 5 days of Azithromycin.   Appears to be PNA- cont Augmentin x 14 days and f/u with Pulmonology         Bilateral pleural effusion  Patient found to have moderate pleural effusion on imaging. I have personally reviewed and interpreted the following imaging: Xray and CT. A thoracentesis was deferred pending pulmonology consultation.  Most likely etiology includes  possible malignancy with new lung mass discovered on CT scan, and pneumonia    Pulmonology recommended IR thoracentesis which was done on 4/29/24 removing 800 ml pleural fluid which was exudative. Pleural fluid culture showed -no  significant isolates/no organisms seen.Pathology pending.   Repeat CT chest showed Moderate to large left pleural effusion and Decreased lung consolidation in the right lower lobe and small dependent right pleural effusion compared to 04/22/2024.   Pulmonology recommended repeat thoracentesis on 5/1/24 - IR consulted an performed thoracentesis removing 750 ccs of dark rebecca fluid        Abdominal distension  Patient with significant constipation and large stool burden.  Patient given multiple laxatives and enemas with some results  GI consult appreciated  Continue  MiraLax, Dulcolax suppository  Encourage patient to mobilize      Anemia  Patient's anemia is currently controlled. Has not received any PRBCs to date. Etiology likely d/t  unknown, work up in progress  Current CBC reviewed-   Lab Results   Component Value Date    HGB 9.5 (L) 04/28/2024    HCT 28.9 (L) 04/28/2024     Monitor serial CBC and transfuse if patient becomes hemodynamically unstable, symptomatic or H/H drops below 7/21.    This is new finding, had normal H&H 6 months ago  Iron-deficiency, CEA negative        Hyponatremia  Patient has hyponatremia which is controlled,We will aim to correct the sodium by 4-6mEq in 24 hours. We will monitor sodium Daily. The hyponatremia is due to Dehydration/hypovolemia. We will treat the hyponatremia with IV fluids as follows: NS at 75 ml/hr. The patient's sodium results have been reviewed and are listed below.    We will continue to trend    BPH (benign prostatic hyperplasia)  Continue finasteride  Queen discontinued- voiding now       Chronic diastolic congestive heart failure  Patient is identified as having Diastolic (HFpEF) heart failure that is Chronic. CHF is currently controlled. Latest ECHO performed and demonstrates- No results found for this or any previous visit.  Monitor clinical status closely. Monitor on telemetry. Patient is off CHF pathway.  Monitor strict Is&Os and daily weights.  Fluid  restriction not indicated at this time. Cardiology has not been consulted. Continue to stress to patient importance of self efficacy and  on diet for CHF. Last BNP reviewed- and noted below   Recent Labs   Lab 04/25/24  0026   *       Holding home Lasix 20 mg daily at this time as patient appears dry on exam    COPD exacerbation  Patient's COPD is with exacerbation noted by continued dyspnea and worsening of baseline hypoxia currently.  Patient is currently off COPD Pathway. Continue scheduled inhalers Antibiotics and Supplemental oxygen and monitor respiratory status closely.     Pt received 3 days of IV solumedrol and has completed a prednisone taper.     Primary hypertension  Chronic, controlled. Latest blood pressure and vitals reviewed-     Temp:  [97.6 °F (36.4 °C)-98.8 °F (37.1 °C)]   Pulse:  [71-86]   Resp:  [15-18]   BP: (162-197)/()   SpO2:  [92 %-99 %] .   Home meds for hypertension were reviewed and noted below.   Hypertension Medications               furosemide (LASIX) 20 MG tablet Take 20 mg by mouth.    hydrALAZINE (APRESOLINE) 50 MG tablet Take 50 mg by mouth 2 (two) times daily.            While in the hospital, will manage blood pressure as follows; trend    Will utilize p.r.n. blood pressure medication only if patient's blood pressure greater than 180/110 and he develops symptoms such as worsening chest pain or shortness of breath.    Pleuritic chest pain  Likely related to pneumonia/pleural effusions and coughing  Serial troponin normal  EKG reviewed, sinus tach with first-degree AV block and right bundle branch block, no concerning ST or T-wave changes  Cardiac monitoring  Resolved        VTE Risk Mitigation (From admission, onward)           Ordered     enoxaparin injection 40 mg  Every 24 hours         04/27/24 0267     Reason for No Pharmacological VTE Prophylaxis  Once        Comments: Possible procedure   Question:  Reasons:  Answer:  Physician Provided (leave comment)     04/23/24 0030     IP VTE HIGH RISK PATIENT  Once         04/23/24 0030     Place sequential compression device  Until discontinued         04/23/24 0030                    Discharge Planning   ERIKA: 5/1/2024     Code Status: Full Code   Is the patient medically ready for discharge?:     Reason for patient still in hospital (select all that apply): Pending disposition  Discharge Plan A: Rehab   Discharge Delays: None known at this time              Lynnette Queen NP  Department of Hospital Medicine   [facility]

## 2024-05-01 NOTE — NURSING
Pt remains free of falls/injury. Safety precautions maintained. Pt denies pain/discomfort.   Cardiac soft bite diet diet tolerated. Pt ambulated to [facility] x3.  Pt sat in chair and tolerated well.VSS. No S/S of distress noted at this time. Bed alarm refused, and family to remain at bedside.12 hour chart check complete. Pt informed of discharge to  Rehab tomorrow.  Will continue to monitor.

## 2024-05-02 VITALS
RESPIRATION RATE: 17 BRPM | OXYGEN SATURATION: 93 % | HEART RATE: 75 BPM | HEIGHT: 71 IN | TEMPERATURE: 98 F | SYSTOLIC BLOOD PRESSURE: 137 MMHG | DIASTOLIC BLOOD PRESSURE: 63 MMHG | WEIGHT: 180.81 LBS | BODY MASS INDEX: 25.31 KG/M2

## 2024-05-02 LAB
BACTERIA FLD AEROBE CULT: NORMAL
BASOPHILS # BLD AUTO: 0.05 K/UL (ref 0–0.2)
BASOPHILS NFR BLD: 0.3 % (ref 0–1.9)
DIFFERENTIAL METHOD BLD: ABNORMAL
EOSINOPHIL # BLD AUTO: 0.4 K/UL (ref 0–0.5)
EOSINOPHIL NFR BLD: 2.4 % (ref 0–8)
ERYTHROCYTE [DISTWIDTH] IN BLOOD BY AUTOMATED COUNT: 13.2 % (ref 11.5–14.5)
GRAM STN SPEC: NORMAL
GRAM STN SPEC: NORMAL
HCT VFR BLD AUTO: 31.6 % (ref 40–54)
HGB BLD-MCNC: 10.4 G/DL (ref 14–18)
IMM GRANULOCYTES # BLD AUTO: 0.5 K/UL (ref 0–0.04)
IMM GRANULOCYTES NFR BLD AUTO: 3.5 % (ref 0–0.5)
LYMPHOCYTES # BLD AUTO: 1.8 K/UL (ref 1–4.8)
LYMPHOCYTES NFR BLD: 12.6 % (ref 18–48)
MCH RBC QN AUTO: 31.4 PG (ref 27–31)
MCHC RBC AUTO-ENTMCNC: 32.9 G/DL (ref 32–36)
MCV RBC AUTO: 96 FL (ref 82–98)
MONOCYTES # BLD AUTO: 1.3 K/UL (ref 0.3–1)
MONOCYTES NFR BLD: 8.7 % (ref 4–15)
NEUTROPHILS # BLD AUTO: 10.4 K/UL (ref 1.8–7.7)
NEUTROPHILS NFR BLD: 72.5 % (ref 38–73)
NRBC BLD-RTO: 0 /100 WBC
PLATELET # BLD AUTO: 320 K/UL (ref 150–450)
PMV BLD AUTO: 9.9 FL (ref 9.2–12.9)
RBC # BLD AUTO: 3.31 M/UL (ref 4.6–6.2)
WBC # BLD AUTO: 14.36 K/UL (ref 3.9–12.7)

## 2024-05-02 PROCEDURE — 27000221 HC OXYGEN, UP TO 24 HOURS

## 2024-05-02 PROCEDURE — 94640 AIRWAY INHALATION TREATMENT: CPT

## 2024-05-02 PROCEDURE — 97530 THERAPEUTIC ACTIVITIES: CPT

## 2024-05-02 PROCEDURE — 85025 COMPLETE CBC W/AUTO DIFF WBC: CPT | Performed by: NURSE PRACTITIONER

## 2024-05-02 PROCEDURE — 63700000 PHARM REV CODE 250 ALT 637 W/O HCPCS: Performed by: FAMILY MEDICINE

## 2024-05-02 PROCEDURE — 25000242 PHARM REV CODE 250 ALT 637 W/ HCPCS: Performed by: INTERNAL MEDICINE

## 2024-05-02 PROCEDURE — 94761 N-INVAS EAR/PLS OXIMETRY MLT: CPT

## 2024-05-02 PROCEDURE — 25000003 PHARM REV CODE 250: Performed by: NURSE PRACTITIONER

## 2024-05-02 PROCEDURE — 97116 GAIT TRAINING THERAPY: CPT | Mod: CQ

## 2024-05-02 PROCEDURE — 36415 COLL VENOUS BLD VENIPUNCTURE: CPT | Performed by: NURSE PRACTITIONER

## 2024-05-02 PROCEDURE — 25000242 PHARM REV CODE 250 ALT 637 W/ HCPCS: Performed by: FAMILY MEDICINE

## 2024-05-02 PROCEDURE — 97530 THERAPEUTIC ACTIVITIES: CPT | Mod: CQ

## 2024-05-02 PROCEDURE — 99900035 HC TECH TIME PER 15 MIN (STAT)

## 2024-05-02 PROCEDURE — 25000003 PHARM REV CODE 250: Performed by: INTERNAL MEDICINE

## 2024-05-02 PROCEDURE — 94799 UNLISTED PULMONARY SVC/PX: CPT

## 2024-05-02 PROCEDURE — 94664 DEMO&/EVAL PT USE INHALER: CPT

## 2024-05-02 PROCEDURE — 27000646 HC AEROBIKA DEVICE

## 2024-05-02 RX ORDER — GUAIFENESIN 600 MG/1
600 TABLET, EXTENDED RELEASE ORAL 2 TIMES DAILY
Start: 2024-05-02

## 2024-05-02 RX ORDER — AMOXICILLIN AND CLAVULANATE POTASSIUM 875; 125 MG/1; MG/1
1 TABLET, FILM COATED ORAL EVERY 12 HOURS
Start: 2024-05-02 | End: 2024-05-16

## 2024-05-02 RX ORDER — HYDROCHLOROTHIAZIDE 12.5 MG/1
12.5 TABLET ORAL DAILY
Start: 2024-05-02 | End: 2025-05-02

## 2024-05-02 RX ORDER — METOPROLOL TARTRATE 25 MG/1
25 TABLET, FILM COATED ORAL 2 TIMES DAILY
Start: 2024-05-02 | End: 2025-05-02

## 2024-05-02 RX ORDER — FINASTERIDE 5 MG/1
5 TABLET, FILM COATED ORAL DAILY
Start: 2024-05-02

## 2024-05-02 RX ORDER — BISACODYL 10 MG/1
10 SUPPOSITORY RECTAL DAILY
Start: 2024-05-02

## 2024-05-02 RX ORDER — TALC
6 POWDER (GRAM) TOPICAL NIGHTLY PRN
Start: 2024-05-02

## 2024-05-02 RX ORDER — TRAMADOL HYDROCHLORIDE 50 MG/1
50 TABLET ORAL EVERY 6 HOURS PRN
Start: 2024-05-02

## 2024-05-02 RX ORDER — GABAPENTIN 600 MG/1
1200 TABLET ORAL 3 TIMES DAILY
Start: 2024-05-02

## 2024-05-02 RX ORDER — AMOXICILLIN 250 MG
2 CAPSULE ORAL 2 TIMES DAILY
Start: 2024-05-02

## 2024-05-02 RX ORDER — POLYETHYLENE GLYCOL 3350 17 G/17G
17 POWDER, FOR SOLUTION ORAL 3 TIMES DAILY
Start: 2024-05-02

## 2024-05-02 RX ORDER — OLANZAPINE 2.5 MG/1
2.5 TABLET ORAL NIGHTLY PRN
Start: 2024-05-02 | End: 2025-05-02

## 2024-05-02 RX ORDER — FLUTICASONE FUROATE, UMECLIDINIUM BROMIDE AND VILANTEROL TRIFENATATE 100; 62.5; 25 UG/1; UG/1; UG/1
1 POWDER RESPIRATORY (INHALATION) DAILY
Start: 2024-05-02

## 2024-05-02 RX ORDER — HYDRALAZINE HYDROCHLORIDE 50 MG/1
50 TABLET, FILM COATED ORAL EVERY 8 HOURS
Start: 2024-05-02

## 2024-05-02 RX ORDER — ALBUTEROL SULFATE 90 UG/1
1 AEROSOL, METERED RESPIRATORY (INHALATION) EVERY 4 HOURS PRN
Start: 2024-05-02

## 2024-05-02 RX ORDER — TAMSULOSIN HYDROCHLORIDE 0.4 MG/1
0.4 CAPSULE ORAL NIGHTLY
Start: 2024-05-02 | End: 2025-05-02

## 2024-05-02 RX ADMIN — HYDRALAZINE HYDROCHLORIDE 50 MG: 25 TABLET, FILM COATED ORAL at 05:05

## 2024-05-02 RX ADMIN — GUAIFENESIN 600 MG: 600 TABLET, EXTENDED RELEASE ORAL at 08:05

## 2024-05-02 RX ADMIN — FINASTERIDE 5 MG: 5 TABLET, FILM COATED ORAL at 08:05

## 2024-05-02 RX ADMIN — AMOXICILLIN AND CLAVULANATE POTASSIUM 1 TABLET: 875; 125 TABLET, FILM COATED ORAL at 08:05

## 2024-05-02 RX ADMIN — METOPROLOL TARTRATE 25 MG: 25 TABLET, FILM COATED ORAL at 08:05

## 2024-05-02 RX ADMIN — HYDROCHLOROTHIAZIDE 12.5 MG: 12.5 TABLET ORAL at 08:05

## 2024-05-02 RX ADMIN — GABAPENTIN 1200 MG: 400 CAPSULE ORAL at 08:05

## 2024-05-02 RX ADMIN — FLUCONAZOLE 200 MG: 100 TABLET ORAL at 08:05

## 2024-05-02 RX ADMIN — ALBUTEROL SULFATE 2.5 MG: 2.5 SOLUTION RESPIRATORY (INHALATION) at 07:05

## 2024-05-02 RX ADMIN — BUDESONIDE INHALATION 0.5 MG: 0.5 SUSPENSION RESPIRATORY (INHALATION) at 07:05

## 2024-05-02 RX ADMIN — SENNOSIDES AND DOCUSATE SODIUM 2 TABLET: 50; 8.6 TABLET ORAL at 08:05

## 2024-05-02 NOTE — PT/OT/SLP PROGRESS
Physical Therapy  Treatment    Jason Mayfield III   MRN: [id number]   Admitting Diagnosis: Acute on chronic respiratory failure with hypoxia    PT Received On: 05/02/24  PT Start Time: 0835     PT Stop Time: 0900    PT Total Time (min): 25 min       Billable Minutes:  Gait Training 10 and Therapeutic Activity 15    Treatment Type: Treatment  PT/PTA: PTA     Number of PTA visits since last PT visit: 2       General Precautions: Standard, fall  Orthopedic Precautions: N/A  Braces: N/A  Respiratory Status: Nasal cannula, flow 3 L/min    Spiritual, Cultural Beliefs, Sikhism Practices, Values that Affect Care: no    Subjective:  Communicated with patient's nurse, Cecille, and completed Epic chart review prior to session.  Patient agreed to PT session.     Pain/Comfort  Pain Rating 1: 8/10  Location - Side 1: Left  Location - Orientation 1: lateral  Location 1: chest  Pain Addressed 1: Other (see comments) (ACTIVITY PACING)  Pain Rating Post-Intervention 1: 8/10    Objective:   Patient found with: telemetry, peripheral IV, oxygen    Supine > sit EOB: Min A     Forward scoot towards EOB: SBA    STS from EOB > RW: Min A (VC for hand placement)    30ft w/ RW Min A (increased time to complete; quick to fatigue)    Stand pivot T/F to chair w/ RW: Min A    STS from chair > RW: Min A (VC for hand placement)    Stand x2 min for brief change. Required CGA to maintain standing balance with BUE self support on RW    Reviewed AROM TE to BLE including: hip flex/ext, knee flex/ext, ankle PF/DF  To be completed a minimum of 10 reps for each LE in order to promote return of function, strength and ROM.     Educated patient on importance of increased tolerance to upright position and direct impact on CV endurance and strength. Patient encouraged to sit up in chair/ EOB, for a minimum of 2 consecutive hours, 3x per day. Encouraged patient to perform AROM TE to BLE throughout the day within all available planes of motion. Re enforced  importance of utilizing call light to meet needs in room and not attempt to get up without staff assistance. Patient verbalized understanding and agreed to comply.       AM-PAC 6 CLICK MOBILITY  How much help from another person does this patient currently need?   1 = Unable, Total/Dependent Assistance  2 = A lot, Maximum/Moderate Assistance  3 = A little, Minimum/Contact Guard/Supervision  4 = None, Modified [place]/Independent    Turning over in bed (including adjusting bedclothes, sheets and blankets)?: 3  Sitting down on and standing up from a chair with arms (e.g., wheelchair, bedside commode, etc.): 3  Moving from lying on back to sitting on the side of the bed?: 3  Moving to and from a bed to a chair (including a wheelchair)?: 3  Need to walk in hospital room?: 3  Climbing 3-5 steps with a railing?: 1 (NT)  Basic Mobility Total Score: 16    AM-PAC Raw Score CMS G-Code Modifier Level of Impairment Assistance   6 % Total / Unable   7 - 9 CM 80 - 100% Maximal Assist   10 - 14 CL 60 - 80% Moderate Assist   15 - 19 CK 40 - 60% Moderate Assist   20 - 22 CJ 20 - 40% Minimal Assist   23 CI 1-20% SBA / CGA   24 CH 0% Independent/ Mod I     Patient left up in chair with call button in reach, chair alarm on, and wife present.    Assessment:  Jason Mayfield III is a 83 y.o. male with a medical diagnosis of Acute on chronic respiratory failure with hypoxia and presents with overall decline in functional mobility. Patient would continue to benefit from skilled PT to address functional limitations listed below in order to return to PLOF/decrease caregiver burden.     Rehab identified problem list/impairments: weakness, impaired endurance, impaired self care skills, impaired functional mobility, gait instability, impaired balance, decreased upper extremity function, decreased lower extremity function, decreased safety awareness, pain, decreased ROM, impaired coordination, impaired cardiopulmonary response to  activity    Rehab potential is fair.    Activity tolerance: Fair    Discharge recommendations: High Intensity Therapy      Barriers to discharge:      Equipment recommendations: to be determined by next level of care     GOALS:   Multidisciplinary Problems       Physical Therapy Goals          Problem: Physical Therapy    Goal Priority Disciplines Outcome Goal Variances Interventions   Physical Therapy Goal     PT, PT/OT Progressing     Description: Goals to be met by 5/8/24.  1. Pt will complete bed mobility MOD I.  2. Pt will complete sit to stand MOD I.  3. Pt will ambulate 200ft MOD I using RW.  4. Pt will increase AMPAC score by 2 points to progress functional mobility.                       PLAN:    Patient to be seen 3 x/week to address the above listed problems via gait training, therapeutic activities, therapeutic exercises  Plan of Care expires: 05/08/24  Plan of Care reviewed with: patient, spouse         05/02/2024

## 2024-05-02 NOTE — PLAN OF CARE
[facility]  Discharge Final Note    Primary Care Provider: TANNER Mane MD    Expected Discharge Date: 5/2/2024    Final Discharge Note (most recent)       Final Note - 05/02/24 0919          Final Note    Assessment Type Final Discharge Note     Anticipated Discharge Disposition Rehab Facility        Post-Acute Status    Post-Acute Authorization Placement     Post-Acute Placement Status Set-up Complete/Auth obtained     Discharge Delays None known at this time                     Important Message from Medicare  Important Message from Medicare regarding Discharge Appeal Rights: Given to patient/caregiver, Explained to patient/caregiver, Signed/date by patient/caregiver     Date IMM was signed: 05/02/24  Time IMM was signed: 0859    Contact Info       Applying for VA Benefits    Phone: [phone]       Next Steps: Follow up    Instructions: Applications can be submitted by phone or go to the nearest VA clinic to sign up in person. VA requests applicants fill out the [id number] form that can be found online: [email or web address]    TANNER Mane MD   Specialty: Internal Medicine   Relationship: PCP - General    [facility]  [address] [facility] [address]       Next Steps: Follow up in 3 day(s)    [facility] PULMONARY MEDICINE   Specialty: Pulmonology    [id number] [place], [place] [id number]   Phone: [phone]       Next Steps: Follow up in 2 week(s)          DC Disposition: [facility]  Family Notified: Patient and spouse at bedside  Transportation: [facility], scheduled for 11 am    Patient needed Inpatient Rehab placement upon DC. Patient and family decided on [facility] for placement. [facility] sent referral for review and insurance approval. Ema approved Patient and gave SW number for nurse report. [facility] messages nurse with number for  report. Per Ema, Patient is scheduled [facility] am [facility] and their wheelchair van will pick Patient up.

## 2024-05-02 NOTE — PLAN OF CARE
Pt. Has been asked to reposition throughout the night but refuses. Was inform of possible skin breakdown if he does not start turning.    Problem: Fall Injury Risk  Goal: Absence of Fall and Fall-Related Injury  Outcome: Progressing     Problem: Skin Injury Risk Increased  Goal: Skin Health and Integrity  Outcome: Progressing

## 2024-05-02 NOTE — PLAN OF CARE
05/02/24 0919   Medicare Message   Important Message from Medicare regarding Discharge Appeal Rights Given to patient/caregiver;Explained to patient/caregiver;Signed/date by patient/caregiver   Date IMM was signed 05/02/24   Time IMM was signed 0859       KHOA meet with patient at bedside to go over Important Message from Medicare (IMM). Patient verbalized understandings of rights and signed IMM, with no reservations. SW placed IMM in Patient's chart.

## 2024-05-02 NOTE — PT/OT/SLP PROGRESS
Occupational Therapy   Treatment    Name: Jason Mayfield III  MRN: [id number]  Admitting Diagnosis:  Acute on chronic respiratory failure with hypoxia       Recommendations:     Discharge Recommendations: High Intensity Therapy  Discharge Equipment Recommendations:  to be determined by next level of care  Barriers to discharge:  None    Assessment:     Jason Mayfield III is a 83 y.o. male with a medical diagnosis of Acute on chronic respiratory failure with hypoxia.  He presents with the following performance deficits affecting function are weakness, impaired endurance, impaired self care skills, impaired functional mobility, gait instability, impaired balance, decreased upper extremity function, decreased lower extremity function, decreased safety awareness, pain, decreased ROM, impaired cardiopulmonary response to activity.     Rehab Prognosis:  Good; patient would benefit from acute skilled OT services to address these deficits and reach maximum level of function.       Plan:     Patient to be seen 2 x/week to address the above listed problems via self-care/home management, therapeutic activities, therapeutic exercises  Plan of Care Expires: 05/08/24  Plan of Care Reviewed with: patient, spouse    Subjective     Chief Complaint: pain, SOB, weakness  Patient/Family Comments/goals: get better, improve strength and mobility  Pain/Comfort:  Pain Rating 1: 8/10  Location - Side 1: Left  Location - Orientation 1: lateral  Location 1: chest  Pain Addressed 1: Reposition, Distraction  Pain Rating Post-Intervention 1: 8/10    Objective:     Communicated with: Nurse and epic chart review prior to session.  Patient found HOB elevated with telemetry, peripheral IV, oxygen upon OT entry to room.    General Precautions: Standard, fall    Orthopedic Precautions:N/A  Braces: N/A  Respiratory Status: Nasal cannula, flow 2 L/min     Occupational Performance:     Bed Mobility:    Patient completed Rolling/Turning to Right with minimum  assistance  Patient completed Supine to Sit with minimum assistance   Forward scoot to EOB with SBA.    Functional Mobility/Transfers:  Patient completed Sit <> Stand Transfer with minimum assistance  with  rolling walker   Patient completed Bed <> Chair Transfer using Stand Pivot technique with minimum assistance with rolling walker  Functional Mobility: Patient completed x30ft functional mobility with Min A and RW to increase dynamic standing balance and activity tolerance needed for ADL completion. Required extended time to complete, pt fatiguing quickly.     Activities of Daily Living:  Lower Body Dressing: maximal assistance doff/rafael brief in standing    [facility] 6 Click ADL: 16    Treatment & Education:  Pt with increased pain today. Demonstrating SOB with exertion; reviewed pursed lip breathing technique and importance of activity pacing. Reviewed role of OT in acute setting and benefits of participation. Educated on techniques to use to increase independence and decrease fall risk with functional transfers. Educated on importance of OOB activity and calling for A to transfer back to bed. Encouraged completion of B UE AROM therex throughout the day to tolerance to increase functional strength and activity tolerance. Educated patient on importance of increased tolerance to upright position and direct impact on CV endurance and strength. Patient encouraged to sit up in chair for a minimum of 2 consecutive hours per day. Patient stated understanding and in agreement with POC.     Patient left up in chair with all lines intact, call button in reach, chair alarm on, and spouse present    GOALS:   Multidisciplinary Problems       Occupational Therapy Goals          Problem: Occupational Therapy    Goal Priority Disciplines Outcome Interventions   Occupational Therapy Goal     OT, PT/OT Progressing    Description: Goals to be met by: 5/8/24     Patient will increase functional independence with ADLs by  performing:    UE Dressing with Modified [place].  Grooming while standing at sink with Modified [place].  Toileting from toilet with Modified [place] for hygiene and clothing management.   Toilet transfer to toilet with Contact Guard Assistance.  Upper extremity exercise program x15 reps per handout, with independence.                         Time Tracking:     OT Date of Treatment: 05/02/24  OT Start Time: 0835  OT Stop Time: 0900  OT Total Time (min): 25 min    Billable Minutes:Therapeutic Activity 25    OT/DONYA: OT      Celia Meng OT     5/2/2024

## 2024-05-02 NOTE — NURSING
Pt refused turns throughout his stay.  Upon leaving pt reported he has a tear on his buttock.  Pt informed this was what the reason for education to turn while lying in bed.  Pt says he know and said he refused turns due to comfort.  Pt stated will turn in rehab to prevent any further breakdown.

## 2024-05-02 NOTE — NURSING
Pt remains free of falls/injury. Safety precautions maintained. PO abx given.  Cardiac soft bite sized diet tolerated. VSS. No S/S of distress noted at this time. Bed alarm refused.  Pt sat up in chair tolerated well.  Pt IV removed per orders.  Pt education complete.  Pt transported to  Rehab.

## 2024-05-03 NOTE — DISCHARGE SUMMARY
[facility] Med Surg [facility]  [facility] Medicine  Discharge Summary      Patient Name: Jason Mayfield III  MRN: [id number]  [facility]: [id number]  Patient Class: IP- Inpatient  Admission Date: 4/22/2024  Hospital Length of Stay: 10 days  Discharge Date and Time: 5/2/2024 11:15 AM  Attending Physician:Dr. Reid  Discharging Provider: Lynnette Queen NP  Primary Care Provider: TANNER Mane MD    Primary Care Team: Networked reference to record PCT     HPI:   Patient is 83-year-old male with past medical history significant for hypertension, NSTEMI, diastolic congestive heart failure, COPD, chronic hypoxic respiratory failure on home O2 @ 2L/min NC, BPH,  fibroadenoma of breast, hypothyroidism, infrarenal AAA(3.4 cm),  chronic back pain, neuropathy, shingles infection, kidney stones, colonic polyps, daily alcohol use, and former tobacco abuse who presented to ED with complaints of chest pain and dyspnea.  He reports  that for the past 2-3 days he has been having left upper chest pain, moderate, worsened with deep breathing, chronic cough, with no radiation into neck, jaw, or arms.  He also reports some generalized weakness and trouble walking over the past 2-3 days as well.  He reports some wheezing although states that this is his baseline. He denies fever, chills , abdominal pain, nausea, vomiting, diarrhea, leg pain, dysuria, or worsening edema. he states that he is normally on 2 L per minute nasal cannula, however over the past couple of days he has turned it up to 3-4 liters/minute.  He is followed by Dr. Gant.  Vital signs on arrival to ED-[facility]  with 98.3 temp, heart rate 107, respiratory rate 25, blood pressure 135/62, 91% SpO2 on 4 liters/minute nasal cannula.  He has remained afebrile while in ED. oxygen has been weaned to 3 liters/minute nasal cannula with SpO2 96%.  Lab workup reveals WBC 17.33, hemoglobin 11.5, hematocrit 34.5, sodium 131, potassium 4.7, chloride 93, CO2 24, BUN 19, creatinine 0.8, glucose 129,  normal LFTs, BNP 85, troponin 0.010, lactic acid 1.0, procalcitonin 0.09,  and normal urinalysis.  EKG:  Sinus tach with first-degree AV block and right bundle-branch block, heart rate 102.  Chest x-ray notes thoracic spinal cord stimulator leads, normal heart size, mild aortic atherosclerosis, mild volume loss with vascular crowding in both lung bases.  CTA of chest shows extensive emphysema, moderate left-sided pleural effusion, mild right-sided pleural effusion, moderate right basilar atelectasis versus consolidation and mild left basilar atelectasis versus consolidation, suggestion of mass in the right lower lobe perifissural region measuring 6.2 x 10 cm, worrisome for malignancy.  He was given Rocephin, DuoNeb, Solu-Medrol, gabapentin, and tramadol while in ED. Hospital Medicine was consulted for admission due to worsening hypoxic respiratory failure, pneumonia, and newly discovered lung mass.            Hospital Course:   Patient is an 83-year-old male with HTN, CAD, diastolic dysfunction, COPD with chronic hypoxic respiratory on 2 L nasal cannula, BPH, chronic back pain, neuropathy, daily alcohol use who presented to ED  with worsening left upper chest pain that was pleuritic in nature with associated generalized weakness and trouble walking over 2-3 days prior to admission. CTA of the chest revealed extensive emphysema, moderate left-sided pleural effusion mild right-sided pleural effusion with right-sided atelectasis versus consolidation, question of a mass in the right lower lobe measuring 6.2 x 10 cm.    Of note, CT chest imaging in September 2023 with marked emphysematous changes and some bronchial wall thickening, no evidence of mass / consolidation / nodules, trace bilateral effusions noted    The pt was admitted on 4/22/24 on IV Rocephin and Azithromycin, Patient's respiratory status worsened. Pulmonary was consulted and placed pt on Vapotherm 25/50, Solu-Medrol IV, and nebulizer. Patient's shortness  of breath improved and he was weaned to nasal cannula. Speech therapy recommended soft, bite sized diet, thin liquids, and aspiration precautions.     Pt had significant abdominal distention with minimal stool output-no bowel movement for 10 days prior to admission.  X-rays revealed large amount of stool.  He received multiple cathartics including to enemas. He had several alrge BMs. He then went another 3 days with no BM. Miralax increased to TID along with pericolace and dulcolax. Mag citrate given- pt had large BMs. Patient has continued to be encouraged to participate with physical therapy and be out of bed 2-3 times per day.    Patient's oxygen requirements have continued to improve-currently on 3 liters NC. Pt received 3 days of IV solumedrol and has completed a prednisone taper. Pt received 7 days of IV Rocephin and 5 days of Azithromycin.   Pulmonology was concerned that Left pleural effusion appears slightly larger from prior imaging and recommended CT chest for further evaluation; may need thoracentesis if larger or appears loculated.   Initially, pt refused repeat CT chest due to SOB when lying flat.   Pulmonology recommended Left thoracentesis per IR which was done on 4/29/24 removing 800 ml pleural fluid which was exudative. Pleural fluid culture showed -no significant isolates/no organisms seen. Pathology pending. SOB improved.   Pt then agreed to CT. Repeat CT chest 4/30/24 showed Moderate to large left pleural effusion and decreased lung consolidation in the right lower lobe and small dependent right pleural effusion compared to 04/22/2024.   Pulmonology recommended repeat thoracentesis. Repeat left thoracentesis was done on 5/1/24 per IR removing 750ml of dark rebecca fluid.   Pulmonology felt RLL lung mass is likely PNA and recommended Augmentin x 2 weeks with close pulmonology f/u. CM consulted for rehab placement- pt was discharged to  Rehab. The patient was seen and examined today and determined  to be stable for discharge.               Goals of Care Treatment Preferences:  Code Status: Full Code      Consults:   Consults (From admission, onward)          Status Ordering Provider     Inpatient consult to Social Work  Once        Provider:  (Not yet assigned)    Completed SUSY TRAVIS     Inpatient consult to Social Work  Once        Provider:  (Not yet assigned)    Completed SUSY TRAVIS     Inpatient consult to Gastroenterology  Once        Provider:  Chandra Culver MD    Completed MANNY CRAWFORD     Inpatient consult to Pulmonology  Once        Provider:  Kena Peralta MD    Completed CHARISMA SHORT                  Final Active Diagnoses:    Diagnosis Date Noted POA    PRINCIPAL PROBLEM:  Acute on chronic respiratory failure with hypoxia [J96.21] 04/23/2024 Yes    Pneumonia [J18.9] 04/23/2024 Yes    Right lower lobe lung mass [R91.8] 04/23/2024 Yes    Bilateral pleural effusion [J90] 04/23/2024 Yes    Debility [R53.81] 04/29/2024 Yes    Abdominal distension [R14.0] 04/25/2024 Yes    Primary hypertension [I10] 04/23/2024 Yes    COPD exacerbation [J44.1] 04/23/2024 Yes    Chronic diastolic congestive heart failure [I50.32] 04/23/2024 Yes    BPH (benign prostatic hyperplasia) [N40.0] 04/23/2024 Yes    Hyponatremia [E87.1] 04/23/2024 Yes    Anemia [D64.9] 04/23/2024 Yes      Problems Resolved During this Admission:       Discharged Condition: stable    Disposition: Rehab Facility    Follow Up:   Follow-up Information       Applying for VA Benefits Follow up.    Why: Applications can be submitted by phone or go to the nearest [facility] clinic to sign up in person. VA requests applicants fill out the [id number] form that can be found online: [email or web address].gov/[email or web address]-[email or web address]  Contact information:  Phone: [phone]             TANNER Mane MD Follow up in 3 day(s).    Specialty: Internal Medicine  Contact information:  [address] [facility] [id number]                [facility] Follow up in 2 week(s).    Specialty: Pulmonology  Contact information:  [address]  [facility] [address] [phone]                         Patient Instructions:      Ambulatory referral/consult to Pulmonology   Standing Status: Future   Referral Priority: Urgent Referral Type: Consultation   Referral Reason: Specialty Services Required   Requested Specialty: Pulmonary Disease   Number of Visits Requested: 1     Diet Cardiac   Order Comments: 2 gm sodium, 1500ml fluid restriction     Diet Cardiac     Activity as tolerated     Activity as tolerated       Significant Diagnostic Studies:   Imaging Results              CTA Chest Non-Coronary (PE Studies) (Final result)  Result time 04/22/24 17:32:24      Final result by Keegan Olivier MD (04/22/24 17:32:24)                   Impression:      Extensive emphysema.  Moderate left-sided pleural effusion mild right-sided pleural effusion.  Moderate right basilar atelectasis/consolidation mild left basilar atelectasis/consolidation.  Suggestion of mass in the right lower lobe roseanne-fissural region.  Findings worrisome for malignancy.  Recommend clinical correlation and follow-up.    All CT scans   are performed using dose optimization techniques including the following: automated exposure control; adjustment of the mA and/or kV; use of iterative reconstruction technique.  Dose modulation was employed for ALARA by means of: Automated exposure control; adjustment of the mA and/or kV according to patient size (this includes techniques or standardized protocols for targeted exams where dose is matched to indication/reason for exam; i.e. extremities or head); and/or use of iterative reconstructive technique.      Electronically signed by: Keegan Olivier  Date:    04/22/2024  Time:    17:32               Narrative:    EXAMINATION:  CTA CHEST NON CORONARY (PE STUDIES)    CLINICAL HISTORY:  Pulmonary embolism (PE) suspected,  unknown D-dimer;    TECHNIQUE:  Low dose axial images, sagittal and coronal reformations were obtained from the thoracic inlet to the lung bases following the IV administration of 100 mL of Omnipaque 350.  Contrast timing was optimized to evaluate the pulmonary arteries.  MIP images were performed.    COMPARISON:  None    FINDINGS:  Mass in the right lower lobe in the Parul fissural region measures 6.2 by 10 cm worrisome for malignancy.  Extensive emphysema.  Moderate left-sided pleural effusion.  Spinal stimulator.  Moderate left-sided pleural effusion.  Mild left basilar atelectasis/consolidation.  Moderate right basilar atelectasis/consolidation and mild right basilar pleural effusion.  No pulmonary emboli.  No aortic dissection.  Atherosclerotic changes.                                       X-Ray Chest AP Portable (Final result)  Result time 04/22/24 15:52:04      Final result by Kulwinder Lopez MD (04/22/24 15:52:04)                   Impression:      See above.      Electronically signed by: Kulwinder Lopez MD  Date:    04/22/2024  Time:    15:52               Narrative:    EXAMINATION:  XR CHEST AP PORTABLE    CLINICAL HISTORY:  Cough and congestion, Cough;    COMPARISON:  09/27/2023 x-ray.    FINDINGS:  Thoracic spinal cord stimulator leads are noted.    Heart size is normal.  Mild aortic atherosclerosis is present    There is mild volume loss with vascular crowding in both lung bases.                                       Pending Diagnostic Studies:       Procedure Component Value Units Date/Time    Cytology, Pulmonary [[id number]] Collected: 04/29/24 1512    Order Status: Sent Lab Status: In process Updated: 04/30/24 1414    Influenza antigen Nasopharyngeal Swab [[id number]] Collected: 04/23/24 0202    Order Status: Sent Lab Status: No result            Medications:  Reconciled Home Medications:      Medication List        START taking these medications      amoxicillin-clavulanate 875-125mg  875-125 mg per tablet  Commonly known as: AUGMENTIN  Take 1 tablet by mouth every 12 (twelve) hours. for 14 days     bisacodyL 10 mg Supp  Commonly known as: DULCOLAX  Place 1 suppository (10 mg total) rectally once daily.     guaiFENesin 600 mg 12 hr tablet  Commonly known as: MUCINEX  Take 1 tablet (600 mg total) by mouth 2 (two) times daily.     hydroCHLOROthiazide 12.5 MG Tab  Commonly known as: HYDRODIURIL  Take 1 tablet (12.5 mg total) by mouth once daily.     melatonin 3 mg tablet  Commonly known as: MELATIN  Take 2 tablets (6 mg total) by mouth nightly as needed for Insomnia.     metoprolol tartrate 25 MG tablet  Commonly known as: LOPRESSOR  Take 1 tablet (25 mg total) by mouth 2 (two) times daily.     OLANZapine 2.5 MG tablet  Commonly known as: ZyPREXA  Take 1 tablet (2.5 mg total) by mouth nightly as needed (aggitation).     polyethylene glycol 17 gram Pwpk  Commonly known as: GLYCOLAX  Take 17 g by mouth 3 (three) times daily.     senna-docusate 8.6-50 mg 8.6-50 mg per tablet  Commonly known as: PERICOLACE  Take 2 tablets by mouth 2 (two) times daily.     tamsulosin 0.4 mg Cap  Commonly known as: FLOMAX  Take 1 capsule (0.4 mg total) by mouth every evening.     traMADoL 50 mg tablet  Commonly known as: ULTRAM  Take 1 tablet (50 mg total) by mouth every 6 (six) hours as needed.            CHANGE how you take these medications      albuterol 90 mcg/actuation inhaler  Commonly known as: PROVENTIL/VENTOLIN HFA  Inhale 1 puff into the lungs every 4 (four) hours as needed for Wheezing.  What changed:   how much to take  when to take this  reasons to take this     finasteride 5 mg tablet  Commonly known as: PROSCAR  Take 1 tablet (5 mg total) by mouth once daily.  What changed: when to take this     gabapentin 600 MG tablet  Commonly known as: NEURONTIN  Take 2 tablets (1,200 mg total) by mouth 3 (three) times daily.  What changed:   how much to take  when to take this     hydrALAZINE 50 MG tablet  Commonly  known as: APRESOLINE  Take 1 tablet (50 mg total) by mouth every 8 (eight) hours.  What changed: when to take this     TRELEGY ELLIPTA 100-62.5-25 mcg Dsdv  Generic drug: fluticasone-umeclidin-vilanter  Inhale 1 puff into the lungs once daily.  What changed: when to take this            CONTINUE taking these medications      aspirin 81 MG Chew  Take 1 tablet by mouth every morning.     atorvastatin 40 MG tablet  Commonly known as: LIPITOR  Take 1 tablet by mouth every evening.            STOP taking these medications      cyclobenzaprine 10 MG tablet  Commonly known as: FLEXERIL     furosemide 20 MG tablet  Commonly known as: LASIX              Indwelling Lines/Drains at time of discharge:   Lines/Drains/Airways       None                   Time spent on the discharge of patient: 45 minutes         Lynnette Queen NP  Department of Hospital Medicine  [facility] - [facility] S[facility]

## 2024-05-07 LAB
FINAL PATHOLOGIC DIAGNOSIS: NORMAL
Lab: NORMAL

## 2024-06-07 ENCOUNTER — LAB VISIT (OUTPATIENT)
Dept: LAB | Facility: HOSPITAL | Age: 84
End: 2024-06-07
Attending: INTERNAL MEDICINE
Payer: MEDICARE

## 2024-06-07 DIAGNOSIS — N40.0 BENIGN ENLARGEMENT OF PROSTATE: ICD-10-CM

## 2024-06-07 LAB
BILIRUB UR QL STRIP: NEGATIVE
CLARITY UR REFRACT.AUTO: CLEAR
COLOR UR AUTO: YELLOW
GLUCOSE UR QL STRIP: NEGATIVE
HGB UR QL STRIP: NEGATIVE
KETONES UR QL STRIP: NEGATIVE
LEUKOCYTE ESTERASE UR QL STRIP: ABNORMAL
MICROSCOPIC COMMENT: NORMAL
NITRITE UR QL STRIP: NEGATIVE
PH UR STRIP: 6 [PH] (ref 5–8)
PROT UR QL STRIP: NEGATIVE
SP GR UR STRIP: 1.01 (ref 1–1.03)
URN SPEC COLLECT METH UR: ABNORMAL
UROBILINOGEN UR STRIP-ACNC: NEGATIVE EU/DL
WBC #/AREA URNS AUTO: 1 /HPF (ref 0–5)

## 2024-06-07 PROCEDURE — 81000 URINALYSIS NONAUTO W/SCOPE: CPT | Mod: PO | Performed by: INTERNAL MEDICINE

## 2024-06-07 PROCEDURE — 87086 URINE CULTURE/COLONY COUNT: CPT | Performed by: INTERNAL MEDICINE

## 2024-06-09 LAB
BACTERIA UR CULT: NORMAL
BACTERIA UR CULT: NORMAL